# Patient Record
Sex: FEMALE | Race: ASIAN | NOT HISPANIC OR LATINO | Employment: FULL TIME | URBAN - METROPOLITAN AREA
[De-identification: names, ages, dates, MRNs, and addresses within clinical notes are randomized per-mention and may not be internally consistent; named-entity substitution may affect disease eponyms.]

---

## 2017-01-03 ENCOUNTER — COMMUNICATION - HEALTHEAST (OUTPATIENT)
Dept: FAMILY MEDICINE | Facility: CLINIC | Age: 37
End: 2017-01-03

## 2017-01-03 DIAGNOSIS — E03.9 HYPOTHYROID: ICD-10-CM

## 2017-01-26 ENCOUNTER — COMMUNICATION - HEALTHEAST (OUTPATIENT)
Dept: FAMILY MEDICINE | Facility: CLINIC | Age: 37
End: 2017-01-26

## 2017-02-28 ENCOUNTER — COMMUNICATION - HEALTHEAST (OUTPATIENT)
Dept: FAMILY MEDICINE | Facility: CLINIC | Age: 37
End: 2017-02-28

## 2017-03-07 ENCOUNTER — OFFICE VISIT - HEALTHEAST (OUTPATIENT)
Dept: FAMILY MEDICINE | Facility: CLINIC | Age: 37
End: 2017-03-07

## 2017-03-07 DIAGNOSIS — N92.0 MENORRHAGIA: ICD-10-CM

## 2017-03-07 DIAGNOSIS — E03.9 HYPOTHYROIDISM: ICD-10-CM

## 2017-03-07 DIAGNOSIS — D50.0 IRON DEFICIENCY ANEMIA DUE TO CHRONIC BLOOD LOSS: ICD-10-CM

## 2017-03-08 ENCOUNTER — COMMUNICATION - HEALTHEAST (OUTPATIENT)
Dept: FAMILY MEDICINE | Facility: CLINIC | Age: 37
End: 2017-03-08

## 2017-03-09 ENCOUNTER — AMBULATORY - HEALTHEAST (OUTPATIENT)
Dept: LAB | Facility: CLINIC | Age: 37
End: 2017-03-09

## 2017-03-09 DIAGNOSIS — D50.9 IRON DEFICIENCY ANEMIA: ICD-10-CM

## 2017-03-09 DIAGNOSIS — E03.9 HYPOTHYROID: ICD-10-CM

## 2017-03-09 DIAGNOSIS — D50.0 IRON DEFICIENCY ANEMIA DUE TO CHRONIC BLOOD LOSS: ICD-10-CM

## 2017-03-09 DIAGNOSIS — N92.0 MENORRHAGIA: ICD-10-CM

## 2017-03-21 ENCOUNTER — AMBULATORY - HEALTHEAST (OUTPATIENT)
Dept: FAMILY MEDICINE | Facility: CLINIC | Age: 37
End: 2017-03-21

## 2017-03-21 DIAGNOSIS — D64.9 ANEMIA: ICD-10-CM

## 2017-04-05 ENCOUNTER — COMMUNICATION - HEALTHEAST (OUTPATIENT)
Dept: FAMILY MEDICINE | Facility: CLINIC | Age: 37
End: 2017-04-05

## 2017-04-05 ENCOUNTER — COMMUNICATION - HEALTHEAST (OUTPATIENT)
Dept: SCHEDULING | Facility: CLINIC | Age: 37
End: 2017-04-05

## 2017-04-05 ENCOUNTER — OFFICE VISIT - HEALTHEAST (OUTPATIENT)
Dept: FAMILY MEDICINE | Facility: CLINIC | Age: 37
End: 2017-04-05

## 2017-04-05 DIAGNOSIS — Z01.818 PREOP EXAMINATION: ICD-10-CM

## 2017-04-05 DIAGNOSIS — D64.9 ANEMIA: ICD-10-CM

## 2017-04-05 DIAGNOSIS — N92.0 MENORRHAGIA: ICD-10-CM

## 2017-04-05 DIAGNOSIS — E03.9 HYPOTHYROIDISM: ICD-10-CM

## 2017-04-05 ASSESSMENT — MIFFLIN-ST. JEOR: SCORE: 1163.03

## 2017-04-06 ENCOUNTER — AMBULATORY - HEALTHEAST (OUTPATIENT)
Dept: FAMILY MEDICINE | Facility: CLINIC | Age: 37
End: 2017-04-06

## 2017-04-06 DIAGNOSIS — D62 ANEMIA DUE TO ACUTE BLOOD LOSS: ICD-10-CM

## 2017-04-07 ENCOUNTER — INFUSION - HEALTHEAST (OUTPATIENT)
Dept: INFUSION THERAPY | Facility: CLINIC | Age: 37
End: 2017-04-07

## 2017-04-07 ENCOUNTER — COMMUNICATION - HEALTHEAST (OUTPATIENT)
Dept: FAMILY MEDICINE | Facility: CLINIC | Age: 37
End: 2017-04-07

## 2017-04-07 DIAGNOSIS — D50.0 IRON DEFICIENCY ANEMIA DUE TO CHRONIC BLOOD LOSS: ICD-10-CM

## 2017-04-10 ENCOUNTER — INFUSION - HEALTHEAST (OUTPATIENT)
Dept: INFUSION THERAPY | Facility: HOSPITAL | Age: 37
End: 2017-04-10

## 2017-04-10 DIAGNOSIS — D62 ACUTE BLOOD LOSS ANEMIA: ICD-10-CM

## 2017-04-10 ASSESSMENT — MIFFLIN-ST. JEOR: SCORE: 1178.91

## 2017-04-11 ENCOUNTER — INFUSION - HEALTHEAST (OUTPATIENT)
Dept: INFUSION THERAPY | Facility: HOSPITAL | Age: 37
End: 2017-04-11

## 2017-04-11 DIAGNOSIS — D62 ACUTE BLOOD LOSS ANEMIA: ICD-10-CM

## 2017-04-12 ENCOUNTER — RECORDS - HEALTHEAST (OUTPATIENT)
Dept: ADMINISTRATIVE | Facility: OTHER | Age: 37
End: 2017-04-12

## 2017-04-12 ENCOUNTER — COMMUNICATION - HEALTHEAST (OUTPATIENT)
Dept: FAMILY MEDICINE | Facility: CLINIC | Age: 37
End: 2017-04-12

## 2017-04-12 ENCOUNTER — AMBULATORY - HEALTHEAST (OUTPATIENT)
Dept: LAB | Facility: CLINIC | Age: 37
End: 2017-04-12

## 2017-04-12 DIAGNOSIS — D64.9 ANEMIA: ICD-10-CM

## 2017-04-12 ASSESSMENT — MIFFLIN-ST. JEOR: SCORE: 1177.1

## 2017-04-13 ENCOUNTER — COMMUNICATION - HEALTHEAST (OUTPATIENT)
Dept: SCHEDULING | Facility: CLINIC | Age: 37
End: 2017-04-13

## 2017-04-13 ENCOUNTER — ANESTHESIA - HEALTHEAST (OUTPATIENT)
Dept: SURGERY | Facility: HOSPITAL | Age: 37
End: 2017-04-13

## 2017-04-13 ENCOUNTER — SURGERY - HEALTHEAST (OUTPATIENT)
Dept: SURGERY | Facility: HOSPITAL | Age: 37
End: 2017-04-13

## 2017-04-13 ASSESSMENT — MIFFLIN-ST. JEOR: SCORE: 1183.45

## 2017-04-18 ENCOUNTER — OFFICE VISIT - HEALTHEAST (OUTPATIENT)
Dept: FAMILY MEDICINE | Facility: CLINIC | Age: 37
End: 2017-04-18

## 2017-04-18 ENCOUNTER — COMMUNICATION - HEALTHEAST (OUTPATIENT)
Dept: FAMILY MEDICINE | Facility: CLINIC | Age: 37
End: 2017-04-18

## 2017-04-18 DIAGNOSIS — D62 ACUTE BLOOD LOSS ANEMIA: ICD-10-CM

## 2017-04-18 DIAGNOSIS — E03.9 HYPOTHYROID: ICD-10-CM

## 2017-04-18 DIAGNOSIS — D25.9 LEIOMYOMA OF UTERUS: ICD-10-CM

## 2017-04-18 DIAGNOSIS — Z09 HOSPITAL DISCHARGE FOLLOW-UP: ICD-10-CM

## 2017-10-06 ENCOUNTER — OFFICE VISIT - HEALTHEAST (OUTPATIENT)
Dept: FAMILY MEDICINE | Facility: CLINIC | Age: 37
End: 2017-10-06

## 2017-10-06 DIAGNOSIS — B34.9 VIRAL SYNDROME: ICD-10-CM

## 2017-10-06 DIAGNOSIS — E03.9 HYPOTHYROID: ICD-10-CM

## 2017-10-06 DIAGNOSIS — R03.0 ELEVATED BLOOD PRESSURE, SITUATIONAL: ICD-10-CM

## 2017-10-06 DIAGNOSIS — D50.0 IRON DEFICIENCY ANEMIA DUE TO CHRONIC BLOOD LOSS: ICD-10-CM

## 2017-10-06 DIAGNOSIS — D64.9 ANEMIA: ICD-10-CM

## 2017-10-13 ENCOUNTER — AMBULATORY - HEALTHEAST (OUTPATIENT)
Dept: FAMILY MEDICINE | Facility: CLINIC | Age: 37
End: 2017-10-13

## 2018-05-11 ENCOUNTER — OFFICE VISIT - HEALTHEAST (OUTPATIENT)
Dept: FAMILY MEDICINE | Facility: CLINIC | Age: 38
End: 2018-05-11

## 2018-05-11 DIAGNOSIS — I10 HIGH BLOOD PRESSURE: ICD-10-CM

## 2018-05-11 DIAGNOSIS — K04.7 APICAL ABSCESS: ICD-10-CM

## 2018-05-11 DIAGNOSIS — E03.9 HYPOTHYROIDISM, UNSPECIFIED TYPE: ICD-10-CM

## 2018-05-11 ASSESSMENT — MIFFLIN-ST. JEOR: SCORE: 1185.71

## 2018-05-15 ENCOUNTER — COMMUNICATION - HEALTHEAST (OUTPATIENT)
Dept: FAMILY MEDICINE | Facility: CLINIC | Age: 38
End: 2018-05-15

## 2018-05-17 ENCOUNTER — COMMUNICATION - HEALTHEAST (OUTPATIENT)
Dept: FAMILY MEDICINE | Facility: CLINIC | Age: 38
End: 2018-05-17

## 2018-05-17 ENCOUNTER — OFFICE VISIT - HEALTHEAST (OUTPATIENT)
Dept: FAMILY MEDICINE | Facility: CLINIC | Age: 38
End: 2018-05-17

## 2018-05-17 DIAGNOSIS — E03.9 HYPOTHYROIDISM, UNSPECIFIED TYPE: ICD-10-CM

## 2018-05-17 DIAGNOSIS — R42 DIZZINESS AND GIDDINESS: ICD-10-CM

## 2018-05-17 DIAGNOSIS — I10 HYPERTENSION GOAL BP (BLOOD PRESSURE) < 140/90: ICD-10-CM

## 2018-05-17 DIAGNOSIS — D25.9 LEIOMYOMA OF UTERUS: ICD-10-CM

## 2018-05-17 LAB
ANION GAP SERPL CALCULATED.3IONS-SCNC: 10 MMOL/L (ref 5–18)
BUN SERPL-MCNC: 17 MG/DL (ref 8–22)
CALCIUM SERPL-MCNC: 10 MG/DL (ref 8.5–10.5)
CHLORIDE BLD-SCNC: 101 MMOL/L (ref 98–107)
CO2 SERPL-SCNC: 26 MMOL/L (ref 22–31)
CREAT SERPL-MCNC: 1.13 MG/DL (ref 0.6–1.1)
GFR SERPL CREATININE-BSD FRML MDRD: 54 ML/MIN/1.73M2
GLUCOSE BLD-MCNC: 96 MG/DL (ref 70–125)
POTASSIUM BLD-SCNC: 4 MMOL/L (ref 3.5–5)
SODIUM SERPL-SCNC: 137 MMOL/L (ref 136–145)

## 2018-05-29 ENCOUNTER — COMMUNICATION - HEALTHEAST (OUTPATIENT)
Dept: FAMILY MEDICINE | Facility: CLINIC | Age: 38
End: 2018-05-29

## 2018-06-01 ENCOUNTER — COMMUNICATION - HEALTHEAST (OUTPATIENT)
Dept: FAMILY MEDICINE | Facility: CLINIC | Age: 38
End: 2018-06-01

## 2018-06-19 ENCOUNTER — RECORDS - HEALTHEAST (OUTPATIENT)
Dept: ADMINISTRATIVE | Facility: OTHER | Age: 38
End: 2018-06-19

## 2020-12-15 ENCOUNTER — COMMUNICATION - HEALTHEAST (OUTPATIENT)
Dept: SCHEDULING | Facility: CLINIC | Age: 40
End: 2020-12-15

## 2020-12-17 ENCOUNTER — COMMUNICATION - HEALTHEAST (OUTPATIENT)
Dept: SCHEDULING | Facility: CLINIC | Age: 40
End: 2020-12-17

## 2020-12-18 ENCOUNTER — COMMUNICATION - HEALTHEAST (OUTPATIENT)
Dept: FAMILY MEDICINE | Facility: CLINIC | Age: 40
End: 2020-12-18

## 2020-12-31 ENCOUNTER — COMMUNICATION - HEALTHEAST (OUTPATIENT)
Dept: FAMILY MEDICINE | Facility: CLINIC | Age: 40
End: 2020-12-31

## 2021-01-07 ENCOUNTER — OFFICE VISIT - HEALTHEAST (OUTPATIENT)
Dept: FAMILY MEDICINE | Facility: CLINIC | Age: 41
End: 2021-01-07

## 2021-01-07 DIAGNOSIS — E06.3 HASHIMOTO'S THYROIDITIS: ICD-10-CM

## 2021-01-07 DIAGNOSIS — Z09 HOSPITAL DISCHARGE FOLLOW-UP: ICD-10-CM

## 2021-01-07 DIAGNOSIS — E06.3 HYPOTHYROIDISM DUE TO HASHIMOTO'S THYROIDITIS: ICD-10-CM

## 2021-01-07 DIAGNOSIS — I50.21 ACUTE SYSTOLIC CONGESTIVE HEART FAILURE (H): ICD-10-CM

## 2021-01-07 LAB
ANION GAP SERPL CALCULATED.3IONS-SCNC: 12 MMOL/L (ref 5–18)
BUN SERPL-MCNC: 21 MG/DL (ref 8–22)
CALCIUM SERPL-MCNC: 9.8 MG/DL (ref 8.5–10.5)
CHLORIDE BLD-SCNC: 101 MMOL/L (ref 98–107)
CO2 SERPL-SCNC: 27 MMOL/L (ref 22–31)
CREAT SERPL-MCNC: 1.17 MG/DL (ref 0.6–1.1)
GFR SERPL CREATININE-BSD FRML MDRD: 51 ML/MIN/1.73M2
GLUCOSE BLD-MCNC: 85 MG/DL (ref 70–125)
POTASSIUM BLD-SCNC: 4.1 MMOL/L (ref 3.5–5)
SODIUM SERPL-SCNC: 140 MMOL/L (ref 136–145)
TSH SERPL DL<=0.005 MIU/L-ACNC: 0.65 UIU/ML (ref 0.3–5)

## 2021-01-08 LAB — BNP SERPL-MCNC: 30 PG/ML (ref 0–64)

## 2021-02-05 ENCOUNTER — COMMUNICATION - HEALTHEAST (OUTPATIENT)
Dept: CARDIOLOGY | Facility: CLINIC | Age: 41
End: 2021-02-05

## 2021-02-08 ENCOUNTER — OFFICE VISIT - HEALTHEAST (OUTPATIENT)
Dept: CARDIOLOGY | Facility: CLINIC | Age: 41
End: 2021-02-08

## 2021-02-08 DIAGNOSIS — I50.20 HEART FAILURE WITH REDUCED EJECTION FRACTION, NYHA CLASS II (H): ICD-10-CM

## 2021-02-08 LAB
ANION GAP SERPL CALCULATED.3IONS-SCNC: 11 MMOL/L (ref 5–18)
BUN SERPL-MCNC: 20 MG/DL (ref 8–22)
CALCIUM SERPL-MCNC: 9.1 MG/DL (ref 8.5–10.5)
CHLORIDE BLD-SCNC: 102 MMOL/L (ref 98–107)
CO2 SERPL-SCNC: 25 MMOL/L (ref 22–31)
CREAT SERPL-MCNC: 1.06 MG/DL (ref 0.6–1.1)
GFR SERPL CREATININE-BSD FRML MDRD: 58 ML/MIN/1.73M2
GLUCOSE BLD-MCNC: 153 MG/DL (ref 70–125)
POTASSIUM BLD-SCNC: 4 MMOL/L (ref 3.5–5)
SODIUM SERPL-SCNC: 138 MMOL/L (ref 136–145)

## 2021-02-15 ENCOUNTER — COMMUNICATION - HEALTHEAST (OUTPATIENT)
Dept: FAMILY MEDICINE | Facility: CLINIC | Age: 41
End: 2021-02-15

## 2021-02-26 ENCOUNTER — COMMUNICATION - HEALTHEAST (OUTPATIENT)
Dept: CARDIOLOGY | Facility: CLINIC | Age: 41
End: 2021-02-26

## 2021-03-01 ENCOUNTER — OFFICE VISIT - HEALTHEAST (OUTPATIENT)
Dept: CARDIOLOGY | Facility: CLINIC | Age: 41
End: 2021-03-01

## 2021-03-01 DIAGNOSIS — I50.22 CHRONIC SYSTOLIC HEART FAILURE (H): ICD-10-CM

## 2021-03-01 LAB
ANION GAP SERPL CALCULATED.3IONS-SCNC: 10 MMOL/L (ref 5–18)
BUN SERPL-MCNC: 19 MG/DL (ref 8–22)
CALCIUM SERPL-MCNC: 9.8 MG/DL (ref 8.5–10.5)
CHLORIDE BLD-SCNC: 99 MMOL/L (ref 98–107)
CO2 SERPL-SCNC: 26 MMOL/L (ref 22–31)
CREAT SERPL-MCNC: 1.16 MG/DL (ref 0.6–1.1)
GFR SERPL CREATININE-BSD FRML MDRD: 52 ML/MIN/1.73M2
GLUCOSE BLD-MCNC: 363 MG/DL (ref 70–125)
POTASSIUM BLD-SCNC: 4 MMOL/L (ref 3.5–5)
SODIUM SERPL-SCNC: 135 MMOL/L (ref 136–145)

## 2021-03-01 ASSESSMENT — MIFFLIN-ST. JEOR: SCORE: 1208.39

## 2021-03-12 ENCOUNTER — COMMUNICATION - HEALTHEAST (OUTPATIENT)
Dept: CARDIOLOGY | Facility: CLINIC | Age: 41
End: 2021-03-12

## 2021-03-15 ENCOUNTER — OFFICE VISIT - HEALTHEAST (OUTPATIENT)
Dept: CARDIOLOGY | Facility: CLINIC | Age: 41
End: 2021-03-15

## 2021-03-15 DIAGNOSIS — I50.20 HEART FAILURE WITH REDUCED EJECTION FRACTION, NYHA CLASS II (H): ICD-10-CM

## 2021-03-15 DIAGNOSIS — I50.22 CHRONIC SYSTOLIC HEART FAILURE (H): ICD-10-CM

## 2021-03-15 RX ORDER — FUROSEMIDE 20 MG
20 TABLET ORAL DAILY
Qty: 90 TABLET | Refills: 3 | Status: SHIPPED | OUTPATIENT
Start: 2021-03-15 | End: 2022-01-20

## 2021-03-15 RX ORDER — LOSARTAN POTASSIUM 25 MG/1
25 TABLET ORAL DAILY
Qty: 90 TABLET | Refills: 3 | Status: SHIPPED | OUTPATIENT
Start: 2021-03-15 | End: 2021-12-16

## 2021-03-15 ASSESSMENT — MIFFLIN-ST. JEOR: SCORE: 1222

## 2021-03-30 ENCOUNTER — OFFICE VISIT - HEALTHEAST (OUTPATIENT)
Dept: CARDIOLOGY | Facility: CLINIC | Age: 41
End: 2021-03-30

## 2021-03-30 DIAGNOSIS — I50.20 HEART FAILURE WITH REDUCED EJECTION FRACTION, NYHA CLASS II (H): ICD-10-CM

## 2021-03-30 DIAGNOSIS — I50.22 CHRONIC SYSTOLIC HEART FAILURE (H): ICD-10-CM

## 2021-03-30 LAB
ANION GAP SERPL CALCULATED.3IONS-SCNC: 8 MMOL/L (ref 5–18)
BUN SERPL-MCNC: 17 MG/DL (ref 8–22)
CALCIUM SERPL-MCNC: 9.4 MG/DL (ref 8.5–10.5)
CHLORIDE BLD-SCNC: 100 MMOL/L (ref 98–107)
CO2 SERPL-SCNC: 30 MMOL/L (ref 22–31)
CREAT SERPL-MCNC: 1.08 MG/DL (ref 0.6–1.1)
GFR SERPL CREATININE-BSD FRML MDRD: 56 ML/MIN/1.73M2
GLUCOSE BLD-MCNC: 215 MG/DL (ref 70–125)
POTASSIUM BLD-SCNC: 4.2 MMOL/L (ref 3.5–5)
SODIUM SERPL-SCNC: 138 MMOL/L (ref 136–145)

## 2021-03-30 ASSESSMENT — MIFFLIN-ST. JEOR: SCORE: 1217.47

## 2021-04-08 ENCOUNTER — HOSPITAL ENCOUNTER (OUTPATIENT)
Dept: CARDIOLOGY | Facility: HOSPITAL | Age: 41
Discharge: HOME | End: 2021-04-08
Attending: INTERNAL MEDICINE

## 2021-04-08 DIAGNOSIS — I50.20 HEART FAILURE WITH REDUCED EJECTION FRACTION, NYHA CLASS II (H): ICD-10-CM

## 2021-04-08 LAB
AORTIC ROOT: 3.1 CM
AORTIC VALVE MEAN VELOCITY: 72 CM/S
ASCENDING AORTA: 2.9 CM
AV DIMENSIONLESS INDEX VTI: 0.7
AV MEAN GRADIENT: 2 MMHG
AV PEAK GRADIENT: 3.8 MMHG
AV VALVE AREA: 2.1 CM2
AV VELOCITY RATIO: 0.6
BSA FOR ECHO PROCEDURE: 1.64 M2
CV BLOOD PRESSURE: ABNORMAL MMHG
CV ECHO HEIGHT: 58 IN
CV ECHO WEIGHT: 145 LBS
DOP CALC AO PEAK VEL: 97 CM/S
DOP CALC AO VTI: 14.7 CM
DOP CALC LVOT AREA: 2.83 CM2
DOP CALC LVOT DIAMETER: 1.9 CM
DOP CALC LVOT PEAK VEL: 57.9 CM/S
DOP CALC LVOT STROKE VOLUME: 31.2 CM3
DOP CALCLVOT PEAK VEL VTI: 11 CM
EJECTION FRACTION: 45 % (ref 55–75)
FRACTIONAL SHORTENING: 40.4 % (ref 28–44)
INTERVENTRICULAR SEPTUM IN END DIASTOLE: 0.9 CM (ref 0.6–0.9)
IVS/PW RATIO: 1
LA AREA 1: 17.7 CM2
LA AREA 2: 14.5 CM2
LEFT ATRIUM LENGTH: 5.2 CM
LEFT ATRIUM SIZE: 3.7 CM
LEFT ATRIUM VOLUME INDEX: 25.6 ML/M2
LEFT ATRIUM VOLUME: 42 ML
LEFT VENTRICLE CARDIAC INDEX: 1.4 L/MIN/M2
LEFT VENTRICLE CARDIAC OUTPUT: 2.3 L/MIN
LEFT VENTRICLE DIASTOLIC VOLUME INDEX: 67.1 CM3/M2 (ref 29–61)
LEFT VENTRICLE DIASTOLIC VOLUME: 110 CM3 (ref 46–106)
LEFT VENTRICLE HEART RATE: 73 BPM
LEFT VENTRICLE MASS INDEX: 87 G/M2
LEFT VENTRICLE SYSTOLIC VOLUME INDEX: 37.2 CM3/M2 (ref 8–24)
LEFT VENTRICLE SYSTOLIC VOLUME: 61 CM3 (ref 14–42)
LEFT VENTRICULAR INTERNAL DIMENSION IN DIASTOLE: 4.7 CM (ref 3.8–5.2)
LEFT VENTRICULAR INTERNAL DIMENSION IN SYSTOLE: 2.8 CM (ref 2.2–3.5)
LEFT VENTRICULAR MASS: 142.7 G
LEFT VENTRICULAR OUTFLOW TRACT MEAN GRADIENT: 1 MMHG
LEFT VENTRICULAR OUTFLOW TRACT MEAN VELOCITY: 45.8 CM/S
LEFT VENTRICULAR OUTFLOW TRACT PEAK GRADIENT: 1 MMHG
LEFT VENTRICULAR POSTERIOR WALL IN END DIASTOLE: 0.9 CM (ref 0.6–0.9)
LV STROKE VOLUME INDEX: 19 ML/M2
MITRAL VALVE E/A RATIO: 0.8
MV AVERAGE E/E' RATIO: 12.9 CM/S
MV DECELERATION TIME: 243 MS
MV E'TISSUE VEL-LAT: 3.9 CM/S
MV E'TISSUE VEL-MED: 3.51 CM/S
MV LATERAL E/E' RATIO: 12.3
MV MEDIAL E/E' RATIO: 13.6
MV PEAK A VELOCITY: 57.3 CM/S
MV PEAK E VELOCITY: 47.9 CM/S
NUC REST DIASTOLIC VOLUME INDEX: 2320 LBS
NUC REST SYSTOLIC VOLUME INDEX: 58 IN
PR MAX PG: 3 MMHG
PR PEAK VELOCITY: 90.9 CM/S
TRICUSPID VALVE ANULAR PLANE SYSTOLIC EXCURSION: 1.3 CM

## 2021-04-08 ASSESSMENT — MIFFLIN-ST. JEOR: SCORE: 1217.47

## 2021-04-14 ENCOUNTER — OFFICE VISIT - HEALTHEAST (OUTPATIENT)
Dept: CARDIOLOGY | Facility: CLINIC | Age: 41
End: 2021-04-14

## 2021-04-14 DIAGNOSIS — I50.22 CHRONIC SYSTOLIC HEART FAILURE (H): ICD-10-CM

## 2021-04-14 ASSESSMENT — MIFFLIN-ST. JEOR: SCORE: 1217.47

## 2021-05-04 ENCOUNTER — COMMUNICATION - HEALTHEAST (OUTPATIENT)
Dept: CARDIOLOGY | Facility: CLINIC | Age: 41
End: 2021-05-04

## 2021-05-04 DIAGNOSIS — I50.22 CHRONIC SYSTOLIC HEART FAILURE (H): ICD-10-CM

## 2021-05-04 RX ORDER — CARVEDILOL 25 MG/1
25 TABLET ORAL 2 TIMES DAILY
Qty: 180 TABLET | Refills: 1 | Status: SHIPPED | OUTPATIENT
Start: 2021-05-04 | End: 2021-09-30

## 2021-05-10 ENCOUNTER — RECORDS - HEALTHEAST (OUTPATIENT)
Dept: FAMILY MEDICINE | Facility: CLINIC | Age: 41
End: 2021-05-10

## 2021-05-30 VITALS — BODY MASS INDEX: 26.81 KG/M2 | WEIGHT: 133 LBS | HEIGHT: 59 IN

## 2021-05-30 VITALS — BODY MASS INDEX: 27.92 KG/M2 | HEIGHT: 58 IN | WEIGHT: 133 LBS

## 2021-05-30 VITALS — BODY MASS INDEX: 28.22 KG/M2 | WEIGHT: 135 LBS

## 2021-05-30 VITALS — BODY MASS INDEX: 26.46 KG/M2 | WEIGHT: 131 LBS

## 2021-05-30 VITALS — WEIGHT: 134 LBS | HEIGHT: 59 IN | BODY MASS INDEX: 27.01 KG/M2

## 2021-05-31 VITALS — BODY MASS INDEX: 28.28 KG/M2 | WEIGHT: 140 LBS

## 2021-06-01 VITALS — BODY MASS INDEX: 28.97 KG/M2 | HEIGHT: 58 IN | WEIGHT: 138 LBS

## 2021-06-01 VITALS — WEIGHT: 136 LBS | BODY MASS INDEX: 28.42 KG/M2

## 2021-06-05 VITALS
OXYGEN SATURATION: 98 % | WEIGHT: 144 LBS | HEART RATE: 98 BPM | BODY MASS INDEX: 30.1 KG/M2 | SYSTOLIC BLOOD PRESSURE: 118 MMHG | DIASTOLIC BLOOD PRESSURE: 86 MMHG | RESPIRATION RATE: 14 BRPM

## 2021-06-05 VITALS
HEIGHT: 58 IN | HEART RATE: 80 BPM | WEIGHT: 145 LBS | BODY MASS INDEX: 30.44 KG/M2 | DIASTOLIC BLOOD PRESSURE: 70 MMHG | RESPIRATION RATE: 16 BRPM | SYSTOLIC BLOOD PRESSURE: 110 MMHG

## 2021-06-05 VITALS
HEIGHT: 58 IN | WEIGHT: 146 LBS | DIASTOLIC BLOOD PRESSURE: 74 MMHG | BODY MASS INDEX: 30.64 KG/M2 | SYSTOLIC BLOOD PRESSURE: 104 MMHG | OXYGEN SATURATION: 98 % | RESPIRATION RATE: 16 BRPM | HEART RATE: 79 BPM

## 2021-06-05 VITALS — WEIGHT: 145 LBS | HEIGHT: 58 IN | BODY MASS INDEX: 30.44 KG/M2

## 2021-06-05 VITALS
WEIGHT: 145 LBS | HEIGHT: 58 IN | RESPIRATION RATE: 16 BRPM | OXYGEN SATURATION: 98 % | HEART RATE: 80 BPM | SYSTOLIC BLOOD PRESSURE: 102 MMHG | DIASTOLIC BLOOD PRESSURE: 78 MMHG | BODY MASS INDEX: 30.44 KG/M2

## 2021-06-05 VITALS
SYSTOLIC BLOOD PRESSURE: 110 MMHG | HEART RATE: 96 BPM | BODY MASS INDEX: 30.02 KG/M2 | DIASTOLIC BLOOD PRESSURE: 78 MMHG | WEIGHT: 143 LBS | HEIGHT: 58 IN | RESPIRATION RATE: 16 BRPM

## 2021-06-05 VITALS
WEIGHT: 140.75 LBS | SYSTOLIC BLOOD PRESSURE: 130 MMHG | TEMPERATURE: 97.8 F | DIASTOLIC BLOOD PRESSURE: 82 MMHG | BODY MASS INDEX: 29.42 KG/M2 | HEART RATE: 101 BPM

## 2021-06-08 ENCOUNTER — COMMUNICATION - HEALTHEAST (OUTPATIENT)
Dept: FAMILY MEDICINE | Facility: CLINIC | Age: 41
End: 2021-06-08

## 2021-06-08 DIAGNOSIS — E06.3 HYPOTHYROIDISM DUE TO HASHIMOTO'S THYROIDITIS: ICD-10-CM

## 2021-06-09 RX ORDER — LEVOTHYROXINE SODIUM 50 UG/1
TABLET ORAL
Qty: 30 TABLET | Refills: 0 | Status: SHIPPED | OUTPATIENT
Start: 2021-06-09 | End: 2022-01-12

## 2021-06-09 NOTE — PROGRESS NOTES
"Pt was seen by GP and OB/GYN secondary to anemia from vaginal bleed.  Pt was to have a D & C but procedure was cancelled due to hemoglobin of 5.5.  Orders  to give venofer x3 and recheck next week. Goal is a hemoglobin of 7 before procedure can be done. Pt states she doesn't feel much different but after extensive discussion with her she did state she has \"some\" periods of lightheadedness if she walks fast. She has been going to work. Pt is drinking a lot of fluids and states she does not get lightheaded when standing. Pt understands hemoglobin relationship with oxygenation and her tachycardia when explained to her.  Resting .  Not exhibiting shortness of breath.  Pt states she will go to the ED if she feels it's unsafe with activity, but for now wants to just follow the orders of venofer and recheck. Pt also states that her hemoglobin fluctuates with amount of bleeding, was at a 6 recently.  Venofer given without difficulty. vss   "

## 2021-06-09 NOTE — PROGRESS NOTES
Assessment/plan  1. Hypothyroidism  2. White coat hypertension  3. Iron deficiency anemia due to chronic blood loss  - HM2(CBC w/o Differential)  - HM2(CBC w/o Differential); Future  4. Menorrhagia  Vital signs stable.   Discussed options of treatment regarding severe anemia, appears to be due to menorrhagia.   Patient notes she was offered iron infusion in the past, but declines again citing the irregular vaginal bleeding is not improving at this point anyway and she would rather take oral iron.   This was prescribed. Strongly urged to be compliant this time. New formulation sent. If insurance does not cover she has agreed to purchase over the counter iron supplementation.   Long discussion on iron rich foods.   Regarding irregular bleeding, she has declined birth control in the past. Has been seen by Ob/gyn in the past where work up was unremarkable. She now agrees to consider birth control. We discussed options at length. She agrees to start OCP. Discussed importance of compliance. Discussed possible adverse affects. She also will keep her appointment with Ob/gyn in two weeks to ensure there is no further work up that is needed. No imaging ordered today.   TSH and thyroid hormones not drawn. Is still taking synthroid. Will return for collection of this, medications to be adjusted as appropriate.       Total time >25 minutes, more than half spent counseling, coordinating care of above.     Subjective  [age] year old female here for follow up.   Has history of hypothyroidism, irregular menses since 2016, anemia. Has seen hem onc in past. Has seen ob gyn in the past regarding ongoing menorrhagia, anemia, all thought to be correlated to abnormal thyroid. She has made a follow up appointment with OB gyn in 2 weeks.   She used to skip several months in her menstrual cycle. Since 2016, she has more and more intermenstrual bleeding, to the point now where the bleeding stops for about one week only. Every now and  again she has a large gush of blood. This last occurred today.   She does not have much abdominal pain or cramping. Denies chest pain, dizziness, shortness of breath, changes in her skin colour. She thinks she might tire out easily more lately. We discussed her hemogram which panic result returned quickly.   She has been offered IV iron. She does not want to take that. She has not been taking iron because her last refill was not covered by insurance for some reason. She is taking her synthroid as directed.   Is trying to eat iron rich food.   Has been on birth control for a short time in the past, she thinks to control her cycle, but nothing lately.     Past Medical History:   Diagnosis Date     Anemia     severe, Hbg 7.2 in 1/2016     Endometrial disorder 2016    pelvic u/s showed heterogenous stripe; getting it biopsied in 2/2016     Hypothyroid     as of 1/2016 bumped up to 50mcg per day     Overweight      Psoriasis      Patient Active Problem List   Diagnosis     Vaginal Itching     Vaginal Discharge     Seborrheic Dermatitis Of The Scalp     Hypothyroidism     Psoriasis     Lightheadedness     Menorrhagia     Iron deficiency anemia due to chronic blood loss     No past surgical history on file.  No family history on file.  Social History     Social History     Marital status: Single     Spouse name: N/A     Number of children: N/A     Years of education: N/A     Occupational History     Not on file.     Social History Main Topics     Smoking status: Never Smoker     Smokeless tobacco: Never Used     Alcohol use No     Drug use: No     Sexual activity: No     Other Topics Concern     Not on file     Social History Narrative     Current Outpatient Prescriptions on File Prior to Visit   Medication Sig Dispense Refill     clobetasol (TEMOVATE) 0.05 % ointment Apply topically 2 (two) times a day. 60 g 3     levothyroxine (SYNTHROID) 50 MCG tablet Take 1 tablet (50 mcg total) by mouth daily. 90 tablet 4      triamcinolone (KENALOG) 0.5 % ointment Apply topically 3 (three) times a day. 30 g 0     No current facility-administered medications on file prior to visit.      Objective  Vitals:    03/07/17 1438   BP: 142/86   Pulse:    Resp:        General Appearance:  Alert, cooperative, no distress, appears stated age   Head:  Normocephalic, without obvious abnormality, atraumatic   Eyes:  PERRL, conjunctiva/corneas clear, EOM's intact   Throat: Lips, mucosa, and tongue normal   Neck: Supple, symmetrical, trachea midline, no adenopathy;  thyroid: not enlarged, symmetric, no tenderness/mass/nodules; no carotid bruit or JVD   Lungs:   Clear to auscultation bilaterally, respirations unlabored   Heart:  Regular rate and rhythm, S1 and S2 normal, no murmur, rub, or gallop   Skin: Skin color, texture, turgor normal, no rashes or lesions   Lymph nodes: Cervical, supraclavicular nodes normal   Neurologic: Normal, CN 2-12 intact

## 2021-06-09 NOTE — PROGRESS NOTES
Assessment/Plan:      Visit for Preoperative Exam.   1. Preop examination  2. Menorrhagia  3. Hypothyroidism  - HM2(CBC w/o Differential)  - Basic Metabolic Panel  - Thyroid Stimulating Hormone (TSH)  - T3, Total  - T4, Free  - Pregnancy (Beta-hCG, Qual), Urine  Appears well. Vital signs stable.   Reviewed history, physical, minimal change in hemoglobin.   Cleared for surgery though location may be changed according to hemoglobin results.  Patient approved for surgery with general or local anesthesia. Postoperative Care will be managed by Hospital Service. Labs will be done as indicated. Above recommendations were reviewed with the patient. Follow up as needed.     Subjective:     Scheduled Procedure: d and c, hysteroscopy  Surgery Date:  4/6/17  Surgery Location:  [facility]  Surgeon:  Dr. Anibal VIRK  [age] year old female here for pre op evaluation.   Has long history of worsening anemia due to menorrhagia, unclear etiology.   She has history of abnormal thyroid, well controlled now.   Her anemia is worsening.   Is taking iron supplementation.   Has been working with OB Gyn.   She feels well today. Her bleeding is actually a little improved though still present.   She needs repeat hemoglobin.   No prior surgery.   Her sister had a D an C with no complications.   No other significant family history.  Quit smoking and drinking alcohol one year ago.          Current Outpatient Prescriptions   Medication Sig Dispense Refill     clobetasol (TEMOVATE) 0.05 % ointment Apply topically 2 (two) times a day. 60 g 3     ferrous gluconate (FERGON) 324 MG tablet Take 1 tablet (324 mg total) by mouth 2 (two) times a day. 60 tablet 11     levothyroxine (SYNTHROID) 50 MCG tablet Take 1 tablet (50 mcg total) by mouth daily. 90 tablet 4     triamcinolone (KENALOG) 0.5 % ointment Apply topically 3 (three) times a day. 30 g 0     No current facility-administered medications for this visit.        No Known Allergies    Immunization  History   Administered Date(s) Administered     Influenza, inj, historic 11/01/2009, 12/11/2015     Tdap 03/28/2013       Patient Active Problem List   Diagnosis     Seborrheic Dermatitis Of The Scalp     Hypothyroidism     Psoriasis     Menorrhagia     Iron deficiency anemia due to chronic blood loss       Past Medical History:   Diagnosis Date     Anemia     severe, Hbg 7.2 in 1/2016     Endometrial disorder 2016    pelvic u/s showed heterogenous stripe; getting it biopsied in 2/2016     Hypothyroid     as of 1/2016 bumped up to 50mcg per day     Overweight      Psoriasis        Social History     Social History     Marital status: Single     Spouse name: N/A     Number of children: N/A     Years of education: N/A     Occupational History     Not on file.     Social History Main Topics     Smoking status: Never Smoker     Smokeless tobacco: Never Used     Alcohol use No     Drug use: No     Sexual activity: No     Other Topics Concern     Not on file     Social History Narrative       History reviewed. No pertinent surgical history.  Recent Health  Fever: no  Chills: no  Fatigue: no  Chest Pain: no  Cough: no  Dyspnea: no  Urinary Frequency: no  Nausea: no  Vomiting: no  Diarrhea: no  Abdominal Pain: no  Easy Bruising: no  Lower Extremity Swelling: no  Poor Exercise Tolerance: yes    Most recent Health Maintenance Visit:  1 year(s) ago    Pertinent History  Prior Anesthesia: no  Previous Anesthesia Reaction:  no  Diabetes: no  Cardiovascular Disease: no  Pulmonary Disease: no  Renal Disease: no  GI Disease: no  Sleep Apnea: no  Thromboembolic Problems: no  Clotting Disorder: no  Bleeding Disorder: no  Transfusion Reaction: no  Impaired Immunity: no  Steroid use in the last 6 months: no  Frequent Aspirin use: no    Family history of none    Social history of patient does not wear denture or partial plates, there is no transfusion refusal and there are no concerns regarding care after surgery    After surgery, the  "patient plans to recover at home with family.    Review of Systems  A 12 point comprehensive review of systems was negative except as noted.          Objective:         Vitals:    04/05/17 1518   BP: 130/78   Pulse: (!) 114   Resp: 20   Temp: 97.9  F (36.6  C)   TempSrc: Oral   SpO2: 99%   Weight: 133 lb (60.3 kg)   Height: 4' 10\" (1.473 m)     Physical Exam:  General Appearance: Alert, cooperative, no distress, appears stated age  Head: Normocephalic, without obvious abnormality, atraumatic  Eyes: PERRL, conjunctiva/corneas clear, EOM's intact  Nose: Nares normal, septum midline,mucosa normal, no drainage  Throat: Lips, mucosa, and tongue normal; teeth and gums normal  Neck: Supple, symmetrical, trachea midline, no adenopathy;  thyroid: not enlarged, symmetric, no tenderness/mass/nodules; no carotid bruit or JVD  Lungs: Clear to auscultation bilaterally, respirations unlabored  Heart: Regular rate and rhythm, S1 and S2 normal, no murmur, rub, or gallop  Abdomen: Soft, non-tender, bowel sounds active all four quadrants,  no masses, no organomegaly  Pelvic:deferred  Extremities: Extremities normal, atraumatic, no cyanosis or edema  Skin: Skin color, texture, turgor normal, no rashes or lesions  Lymph nodes: Cervical, supraclavicular, and axillary nodes normal  Neurologic: Normal           Recent Results (from the past 240 hour(s))   Pregnancy (Beta-hCG, Qual), Urine   Result Value Ref Range    Pregnancy Test, Urine Negative Negative    Specific Gravity, UA 1.010 1.001 - 1.030     Others pending, hemoglobin preliminary 5.8 g/dl, sent out.     "

## 2021-06-10 NOTE — PROGRESS NOTES
Assessment/plan  1. Hospital discharge follow-up  2. Acute blood loss anemia  - HM2(CBC w/o Differential)  3. Hypothyroid  4. Leiomyoma of uterus  Hospital records reviewed.   Problem list, past medical, surgical, medications reconciled.   Appears well.   Pathology report of leiomyoma discussed with patient.   We will recheck hemogram today. Continue iron supplementation until normal.   Advised to keep follow up with ob gyn.   Will continue oral contraceptive medication until desires pregnancy as directed by ob gyn.   Follow up on hemogram and thyroid studies in three months. Recent T4 and T3 is normal though TSH elevated.         Subjective  [age] year old female here for follow up.   Recently admitted and discharged from hospital, s/p hysteroscopy, d and c, excision of leiomyoma due to excessive bleeding, anemia.   Has been working with ob gyn, her last hemogram was not corrected with IV infusion of iron, was transfused two units of PRBC prior to surgery. Large fibroid and open cervix noted. EHR reviewed and hospital records reviewed.   She feels better today. She notices her heart is not racing as much. Her bleeding is little. Denies pelvic pain, abnormal discharge, dysuria, fever.   Is planning to return to work. Is taking sprintec daily as directed. Is taking oral iron also and wondering if she needs to continue that.   Is tolerating diet. A little constipated some times.   No plans to conceive at his time but some time in the future. Is taking her thyroid medication.       Past Medical History:   Diagnosis Date     Anemia     severe, Hbg 7.2 in 1/2016     Endometrial disorder 2016    pelvic u/s showed heterogenous stripe; getting it biopsied in 2/2016     Hypothyroid     as of 1/2016 bumped up to 50mcg per day     Iron deficiency anemia 4/6/2017     Leiomyoma of uterus 04/13/2017     Overweight      Psoriasis      Patient Active Problem List   Diagnosis     Vaginal Itching     Vaginal Discharge      Seborrheic Dermatitis Of The Scalp     Hypothyroidism, unspecified type     Psoriasis     Lightheadedness     Menorrhagia with regular cycle     Iron deficiency anemia due to chronic blood loss     Iron deficiency anemia     Acute blood loss anemia     Anemia     Leiomyoma of uterus     Past Surgical History:   Procedure Laterality Date     WA HYSTEROSCOPY,W/ENDOMETRIAL ABLATION N/A 4/13/2017    Procedure: HYSTEROSCOPY DILATION AND CURETTAGE, MYOMECTOMY;  Surgeon: Alyse Barnett MD;  Location: [facility];  Service: Gynecology     Family History   Problem Relation Age of Onset     No Medical Problems Mother      No Medical Problems Father      Social History     Social History     Marital status: Single     Spouse name: N/A     Number of children: N/A     Years of education: N/A     Occupational History     Not on file.     Social History Main Topics     Smoking status: Never Smoker     Smokeless tobacco: Never Used     Alcohol use No     Drug use: No     Sexual activity: Yes     Partners: Male     Birth control/ protection: OCP     Other Topics Concern     Not on file     Social History Narrative     Current Outpatient Prescriptions on File Prior to Visit   Medication Sig Dispense Refill     clobetasol (TEMOVATE) 0.05 % ointment Apply 1 application topically as needed.       ferrous gluconate (FERGON) 324 MG tablet Take 1 tablet (324 mg total) by mouth 2 (two) times a day. 60 tablet 11     ibuprofen (ADVIL,MOTRIN) 800 MG tablet Take 1 tablet (800 mg total) by mouth every 8 (eight) hours as needed. 30 tablet 0     levothyroxine (SYNTHROID) 50 MCG tablet Take 1 tablet (50 mcg total) by mouth daily. 90 tablet 4     norgestimate-ethinyl estradiol (SPRINTEC, 28,) 0.25-35 mg-mcg per tablet Take 1 tablet by mouth daily.       triamcinolone (KENALOG) 0.5 % ointment Apply 1 application topically as needed.       No current facility-administered medications on file prior to visit.      Objective  Vitals:     04/18/17 0903   BP: 122/78   Pulse: 72   Resp: 20       General Appearance:  Alert, cooperative, no distress, appears stated age   Head:  Normocephalic, without obvious abnormality, atraumatic   Eyes:  PERRL, conjunctiva/corneas clear, EOM's intact   Ears:  Normal TM's and external ear canals, both ears   Throat: Lips, mucosa, and tongue normal; teeth and gums normal   Neck: Supple, symmetrical, trachea midline, no adenopathy;  thyroid: not enlarged, symmetric, no tenderness/mass/nodules; no carotid bruit or JVD   Lungs:   Clear to auscultation bilaterally, respirations unlabored   Heart:  Regular rate and rhythm, S1 and S2 normal, no murmur, rub, or gallop   Abdomen:   Soft, non-tender, bowel sounds active all four quadrants,  no masses, no organomegaly   Genitalia: defer   Extremities: Extremities normal, atraumatic, no cyanosis or edema   Skin: Skin color, texture, turgor normal, no rashes or lesions   Neurologic: No focal deficits     Recent Results (from the past 240 hour(s))   Thyroid Stimulating Hormone (TSH)   Result Value Ref Range    TSH 17.98 (H) 0.30 - 5.00 uIU/mL   T4, Free   Result Value Ref Range    Free T4 0.7 0.7 - 1.8 ng/dL   T3, Total   Result Value Ref Range    T3, Total 87 45 - 175 ng/dL   HM2(CBC w/o Differential)   Result Value Ref Range    WBC 9.5 4.0 - 11.0 thou/uL    RBC 2.54 (L) 3.80 - 5.40 mill/uL    Hemoglobin 6.3 (LL) 12.0 - 16.0 g/dL    Hematocrit 23.9 (L) 35.0 - 47.0 %    MCV 94 80 - 100 fL    MCH 24.8 (L) 27.0 - 34.0 pg    MCHC 26.4 (L) 32.0 - 36.0 g/dL    RDW 27.9 (H) 11.0 - 14.5 %    Platelets 415 140 - 440 thou/uL    MPV 10.2 8.5 - 12.5 fL   Type and screen   Result Value Ref Range    ABORh A POS     Antibody Screen Negative Negative   Magnesium   Result Value Ref Range    Magnesium 1.9 1.8 - 2.6 mg/dL   Renal Function Profile   Result Value Ref Range    Albumin 3.0 (L) 3.5 - 5.0 g/dL    Calcium 8.4 (L) 8.5 - 10.5 mg/dL    Phosphorus 4.1 2.5 - 4.5 mg/dL    Glucose 96 70 - 125  mg/dL    BUN 13 8 - 22 mg/dL    Creatinine 0.90 0.60 - 1.10 mg/dL    Sodium 138 136 - 145 mmol/L    Potassium 3.8 3.5 - 5.0 mmol/L    Chloride 110 (H) 98 - 107 mmol/L    CO2 22 22 - 31 mmol/L    Anion Gap, Calculation 6 5 - 18 mmol/L    GFR MDRD Af Amer >60 >60 mL/min/1.73m2    GFR MDRD Non Af Amer >60 >60 mL/min/1.73m2   HM1 (CBC with Diff)   Result Value Ref Range    WBC 8.4 4.0 - 11.0 thou/uL    RBC 3.39 (L) 3.80 - 5.40 mill/uL    Hemoglobin 9.0 (L) 12.0 - 16.0 g/dL    Hematocrit 30.8 (L) 35.0 - 47.0 %    MCV 91 80 - 100 fL    MCH 26.5 (L) 27.0 - 34.0 pg    MCHC 29.2 (L) 32.0 - 36.0 g/dL    RDW 22.1 (H) 11.0 - 14.5 %    Platelets 300 140 - 440 thou/uL    MPV 9.4 8.5 - 12.5 fL    Neutrophils % 64 50 - 70 %    Lymphocytes % 24 20 - 40 %    Monocytes % 9 2 - 10 %    Eosinophils % 2 0 - 6 %    Basophils % 1 0 - 2 %    Neutrophils Absolute 5.3 2.0 - 7.7 thou/uL    Lymphocytes Absolute 2.0 0.8 - 4.4 thou/uL    Monocytes Absolute 0.8 0.0 - 0.9 thou/uL    Eosinophils Absolute 0.2 0.0 - 0.4 thou/uL    Basophils Absolute 0.1 0.0 - 0.2 thou/uL   Manual Differential   Result Value Ref Range    Platelet Estimate Normal Normal    Ovalocytes 1+ (!) Negative    Polychromasia 1+ (!) Negative   Pregnancy, Urine   Result Value Ref Range    Pregnancy Test, Urine Negative Negative    Specific Gravity, UA 1.008 1.001 - 1.030   Surgical Pathology Exam   Result Value Ref Range    Case Report       Surgical Pathology                                Case: [id number]                                    Authorizing Provider:  Alyse Barnett MD     Collected:           04/13/2017 1315              Ordering Location:     [facility] OR     Received:            04/13/2017 1343              Pathologist:           Gabe Gonzales MD                                                         Specimen:    Endometrial Curettings, Endometrial Curettings and Fibroid                                 Final Diagnosis       ENDOMETRIAL TISSUE,  CURETTAGE AND BIOPSY:      1) POLYPOID FRAGMENTS OF DISORDERED PROLIFERATIVE ENDOMETRIUM; NO EVIDENCE OF           ATYPICAL ENDOMETRIAL HYPERPLASIA OR CARCINOMA       2) FRAGMENTS OF HYPERPLASTIC SMOOTH MUSCLE COMPATIBLE WITH LEIOMYOMA       3) SCANTY FRAGMENTS OF BENIGN CHRONICALLY INFLAMED ENDOCERVICAL MUCOSA       4) SCANTY FRAGMENTS OF BENIGN ECTOCERVICAL EPITHELIUM       5) BLOOD, MUCUS, AND EMBEDDED LEUKOCYTES       6) NEGATIVE FOR MALIGNANCY    Microscopic Description       Microscopic examination performed, substantiating the above diagnosis.    Clinical Information       Pre-op Diagnosis:  Excessive or frequent menstruation [N92.0] Anemia [D64.9]   Time in Formalin:  1:22 pm    Gross Description       Received in formalin labeled with the patient's name and endometrial curettings and fibroid is a filter bag with a 1.6 x 1.2 x 0.3 cm aggregate of white-pink to red, irregular, dense and faintly whorled tissue fragments. There are additional Telfa pads with loosely adherent tan-pink to red, irregular soft tissue fragments, mucus and blood clot. These tissue fragments aggregate 2.6 x 1.4 x 0.5 cm.     Also present within the same specimen container is a disrupted, oblong, 4.9 x 4.3 x 3.7 cm mass. This white-pink to red, congested, rubbery and whorled mass is partially surfaced by a pink-red, glistening tissue. No excrescences, indurated lesions or suspicious areas of hemorrhagic necrosis are identified.   RS-5C    SUMMARY OF CASSETTES: 1) Tissue from filter bag; 2-3) Tissue from Telfa pad; 4-5) Representative mass  RJR:sg    Charges CPT: 27012  ICD-10: D25.1     Result Flag  Normal   Crossmatch   Result Value Ref Range    Crossmatch Compatible     Blood Expiration Date [id number]     Unit Type A Pos     Unit Number [id number]     Status Transfused     Component Red Blood Cells     PRODUCT CODE [id number]     Issue Date and Time [id number]     Blood Type 6200     CODING SYSTEM [id number]    Crossmatch    Result Value Ref Range    Crossmatch Compatible     Blood Expiration Date 21993148093935     Unit Type A Pos     Unit Number [id number]     Status Transfused     Component Red Blood Cells     PRODUCT CODE [id number]     Issue Date and Time [id number]     Blood Type 6200     CODING SYSTEM [id number]    HM2(CBC w/o Differential)   Result Value Ref Range    WBC 5.5 4.0 - 11.0 thou/uL    RBC 4.33 3.80 - 5.40 mill/uL    Hemoglobin 11.4 (L) 12.0 - 16.0 g/dL    Hematocrit 37.0 35.0 - 47.0 %    MCV 85 80 - 100 fL    MCH 26.4 (L) 27.0 - 34.0 pg    MCHC 30.9 (L) 32.0 - 36.0 g/dL    RDW 16.7 (H) 11.0 - 14.5 %    Platelets 318 140 - 440 thou/uL    MPV 7.6 7.0 - 10.0 fL

## 2021-06-10 NOTE — ANESTHESIA POSTPROCEDURE EVALUATION
Patient: Be Alexis  HYSTEROSCOPY DILATION AND CURETTAGE, MYOMECTOMY  Anesthesia type: MAC    Patient location: [facility] Surgical Floor  Last vitals:   Vitals:    04/13/17 1351   BP: 137/83   Pulse: 86   Resp: 16   Temp:    SpO2: 99%     Post vital signs: stable  Level of consciousness: awake and responds to simple questions  Post-anesthesia pain: pain controlled  Post-anesthesia nausea and vomiting: no  Pulmonary: unassisted, return to baseline  Cardiovascular: stable and blood pressure at baseline  Hydration: adequate  Anesthetic events: no    QCDR Measures:  ASA# 11 - Kelley-op Cardiac Arrest: ASA11B - Patient did NOT experience unanticipated cardiac arrest  ASA# 12 - Kelley-op Mortality Rate: ASA12B - Patient did NOT die  ASA# 13 - PACU Re-Intubation Rate: ASA13B - Patient did NOT require a new airway mgmt  ASA# 10 - Composite Anes Safety: ASA10A - No serious adverse event  ASA# 38 - New Corneal Injury: ASA38A - No new exposure keratitis or corneal abrasion in PACU    Additional Notes:

## 2021-06-10 NOTE — ANESTHESIA PREPROCEDURE EVALUATION
Anesthesia Evaluation      Patient summary reviewed   No history of anesthetic complications     Airway   Mallampati: III  Neck ROM: full   Pulmonary - negative ROS and normal exam   (-) not a smoker                         Cardiovascular   (-) murmur  Rhythm: regular  Rate: normal,    no murmur   ROS comment: Anemia of 6.3 yesterday due to chronic ongoing uterine bleeding.  Transfused and her Hg is 9.0 today.     Neuro/Psych - negative ROS     Endo/Other    (+) hypothyroidism,      Comments: overweight    GI/Hepatic/Renal    (-) GERD          Dental - normal exam                        Anesthesia Plan  Planned anesthetic: MAC    ASA 2   Induction: intravenous   Anesthetic plan and risks discussed with: patient and spouse    Post-op plan: routine recovery

## 2021-06-10 NOTE — ANESTHESIA CARE TRANSFER NOTE
Last vitals:   Vitals:    04/13/17 1351   BP: 137/83   Pulse: 86   Resp: 16   Temp:    SpO2: 99%     Patient's level of consciousness is awake  Spontaneous respirations: yes  Maintains airway independently: yes  Dentition unchanged: yes  Oropharynx: oropharynx clear of all foreign objects    QCDR Measures:  ASA# 20 - Surgical Safety Checklist: ASA20A - Safety Checks Done  PQRS# 430 - Adult PONV Prevention: 4558F - Pt received => 2 anti-emetic agents (different classes) preop & intraop  ASA# 8 - Peds PONV Prevention: NA - Not pediatric patient, not GA or 2 or more risk factors NOT present  PQRS# 424 - Kelley-op Temp Management: 4559F - At least one body temp DOCUMENTED => 35.5C or 95.9F within required timeframe  PQRS# 426 - PACU Transfer Protocol: - Transfer of care checklist used  ASA# 14 - Acute Post-op Pain: ASA14B - Patient did NOT experience pain >= 7 out of 10    I completed my SBAR handoff to the receiving nurse per policy and procedure.

## 2021-06-10 NOTE — PROGRESS NOTES
Be Alexis arrived for dose 2 Venofer infusion. Lab results were  reviewed as required. She reports she had no problems after her first dose on 04/07/17. Be Alexis was educated on her plan of care. Each medication given today was reviewed prior to administration. Treatment was completed as ordered with no signs of reaction. IV site:peripheral IV patent throughout treatment with positive blood return obtained before and at completion. IV site with no pain, redness, swelling and drainage. IV site flushed with saline and discontinued. Be Alexis has follow-up appointment scheduled tomorrow. Be Alexis was discharged to home. She discharged ambulatory per self at 1608.      Silvia Vallecillo

## 2021-06-10 NOTE — PROGRESS NOTES
1335 Be arrived A&Ox4 ambulatory and stable, confirms she is here for 3rd and final ordered dose of iron sucrose. Pt states she has tolerated infusions w/o adverse Rxn and that she has no questions/concerns today.  PIV L AC, at pt request, w ease, excellent blood return and line easily flushed NS  Iron sucrose infused as ordered; pt tolerated w/o sxs adverse Rxn. Line flushed Ns until clear. VSS. PIV dc'd and site covered w clean dressing.  Dc education reviewed, pt stated good understanding and that her needs were met today. Pt has appt tomorrow am to have labs drawn; goal is to be able to have D&C in the near future.  1555 Be dc'd A&Ox4 ambulatory and stable

## 2021-06-13 NOTE — TELEPHONE ENCOUNTER
FNA triage call :  Travel and exposure negative , 2nd covid test on  12/11/20 negative , and negative test  On 11/27/20 but has  SX is shortness of breath started  about 12/1/20 .   Presenting problem :  Pt called. Intermittent , shortness of breath has worsened in the last 24 hours , more tired and didn't sleep because hard to breath . Currently: 1&0, eating and  Homebound activity  , /146  ,    Pulse  120     , No fever or cough .    Guideline used : Covid suspected A AH and Hypertension A OH>   Disposition and recommendations : COVID 19 Nurse Triage Plan/Patient Instructions    Please be aware that novel coronavirus (COVID-19) may be circulating in the community. If you develop symptoms such as fever, cough, or SOB or if you have concerns about the presence of another infection including coronavirus (COVID-19), please contact your health care provider or visit [email or web address].     Disposition/Instructions / Pt declines 911 but will have someone drive her to [facility] ED now instead.    Call to EMS/911 recommended. Follow protocol based instructions.    Bring Your Own Device:  Please also bring your smart device(s) (smart phones, tablets, laptops) and their charging cables for your personal use and to communicate with your care team during your visit.      Thank you for taking steps to prevent the spread of this virus.  o Limit your contact with others.  o Wear a simple mask to cover your cough.  o Wash your hands well and often.    Resources    [facility]: About COVID-19: [email or web address]    CDC: What to Do If You're Sick: www.cdc.gov/coronavirus/2019-ncov/about/steps-when-sick.html    CDC: Ending Home Isolation: www.cdc.gov/coronavirus/2019-ncov/hcp/disposition-in-home-patients.html     CDC: Caring for Someone: www.cdc.gov/coronavirus/2019-ncov/if-you-are-sick/care-for-someone.html     [facility]: Interim Guidance for Hospital Discharge to Home:  www.health.[facility].mn.us/diseases/coronavirus/hcp/hospdischarge.pdf    [facility] clinical trials (COVID-19 research studies): [email or web address]-trials     Below are the COVID-19 hotlines at the [facility] of Health ([facility]). Interpreters are available.   o For health questions: Call 607-776-3452 or 1-189.149.8243 (7 a.m. to 7 p.m.)  o For questions about schools and childcare: Call 595-184-5798 or 1-356.138.5956 (7 a.m. to 7 p.m.)     Caller verbalizes understanding and denies further questions and will call back if further symptoms to triage or questions  . Ella Copeland RN  - [employer] Nurse Advisor     Reason for Disposition    MODERATE difficulty breathing (e.g., speaks in phrases, SOB even at rest, pulse 100-120)    Sounds like a life-threatening emergency to the triager    Additional Information    Negative: SEVERE difficulty breathing (e.g., struggling for each breath, speaks in single words)    Negative: Difficult to awaken or acting confused (e.g., disoriented, slurred speech)    Negative: Bluish (or gray) lips or face now    Negative: Shock suspected (e.g., cold/pale/clammy skin, too weak to stand, low BP, rapid pulse)    Negative: Sounds like a life-threatening emergency to the triager    Negative: [1] COVID-19 exposure AND [2] no symptoms    Negative: [1] Lives with someone known to have influenza (flu test positive) AND [2] flu-like symptoms (e.g., cough, runny nose, sore throat, SOB; with or without fever)    Negative: [1] Adult with possible COVID-19 symptoms AND [2] triager concerned about severity of symptoms or other causes    Negative: Immunization reaction suspected (e.g., fever, headache, muscle aches occurring during days 1-3 days after immunization)    Negative: COVID-19 and breastfeeding, questions about    Negative: SEVERE or constant chest pain or pressure (Exception: mild central chest pain, present only when coughing)    Protocols used: CORONAVIRUS  (COVID-19) DIAGNOSED OR [id number] 12.1.20, HIGH BLOOD PRESSURE-A-OH

## 2021-06-13 NOTE — TELEPHONE ENCOUNTER
New Appointment Needed  What is the reason for the visit:    Inpatient/ED Follow Up Appt Request  At what hospital or facility were you seen?: [facility]  What is the reason you were seen?: acute pulmonary edema  What date were you admitted?: date: 12/14/20  What date were you discharged?: date: 12/18/20  What was the recommended timeframe for your follow up appointment?: 5-7 days  Provider Preference: PCP only  How soon do you need to be seen?: next week  Waitlist offered?: No  Okay to leave a detailed message:  Yes

## 2021-06-13 NOTE — PROGRESS NOTES
Assessment/plan  1. Anemia  2. Iron deficiency anemia due to chronic blood loss  Improved hemogram.   Continue regular diet.   Recheck if bleeding recurs.   If no menses in three months, patient will call or be seen, will start progesterone challenge.   If no menses within six months, advised referral back to ob/gyn.   - HM2(CBC w/o Differential)    3. Hypothyroid  Reviewed blood work.   Not currently on any thyroid medication.   Will monitor closely.   - Thyroid Stimulating Hormone (TSH); Future  - T4, Free  - T3, Total  - Thyroid Stimulating Hormone (TSH)    4. Elevated blood pressure, situational  Attributed to illness.   Advised on recheck at the next visit.     5. Viral syndrome  Discussed supportive care.   Increase clear fluids. Advance diet as tolerated. Consider antihistamine is congestion does not improve. Will avoid decongestants.   Follow up if no change or worsening.             Subjective  [age] year old female here for follow up.   EHR reviewed.   Has history of severe anemia, menorrhagia, abnormal thyroid function.   S/p d and c in April.   She has been well since then though recently sick with congestion, vomiting, cramping, she thinks she is caught her sister's illness.   She has has no menstrual bleeding since April. She denies pelvic pain, abnormal discharge.   She is not taking iron supplementation. Is not taking any thyroid medication.   She wonders what to do about having no menstrual cycle. Due to prior anemia, we discussed taking advantage of this for the next few months.   She is aware of a possible progesterone challenge.   She is working.   Is otherwise well.   We discussed her blood pressure. She thinks she is nervous about her illness.          ROS: 12 systems reviewed, all negative except for what is mentioned in HPI.       Past Medical History:   Diagnosis Date     Anemia     severe, Hbg 7.2 in 1/2016     Endometrial disorder 2016    pelvic u/s showed heterogenous stripe; getting  it biopsied in 2/2016     Hypothyroid     as of 1/2016 bumped up to 50mcg per day     Iron deficiency anemia 4/6/2017     Leiomyoma of uterus 04/13/2017     Overweight      Psoriasis      Patient Active Problem List   Diagnosis     Vaginal Itching     Vaginal Discharge     Seborrheic Dermatitis Of The Scalp     Hypothyroidism, unspecified type     Psoriasis     Lightheadedness     Menorrhagia with regular cycle     Iron deficiency anemia due to chronic blood loss     Iron deficiency anemia     Acute blood loss anemia     Anemia     Leiomyoma of uterus     Past Surgical History:   Procedure Laterality Date     RI HYSTEROSCOPY,W/ENDOMETRIAL ABLATION N/A 4/13/2017    Procedure: HYSTEROSCOPY DILATION AND CURETTAGE, MYOMECTOMY;  Surgeon: Alyse Barnett MD;  Location: [facility] [place];  Service: Gynecology     Family History   Problem Relation Age of Onset     No Medical Problems Mother      No Medical Problems Father      Social History     Social History     Marital status: Single     Spouse name: N/A     Number of children: N/A     Years of education: N/A     Occupational History     Not on file.     Social History Main Topics     Smoking status: Never Smoker     Smokeless tobacco: Never Used     Alcohol use No     Drug use: No     Sexual activity: Yes     Partners: Male     Birth control/ protection: OCP     Other Topics Concern     Not on file     Social History Narrative     Current Outpatient Prescriptions on File Prior to Visit   Medication Sig Dispense Refill     clobetasol (TEMOVATE) 0.05 % ointment Apply 1 application topically as needed.       triamcinolone (KENALOG) 0.5 % ointment Apply 1 application topically as needed.       ferrous gluconate (FERGON) 324 MG tablet Take 1 tablet (324 mg total) by mouth 2 (two) times a day. 60 tablet 11     levothyroxine (SYNTHROID) 50 MCG tablet Take 1 tablet (50 mcg total) by mouth daily. 90 tablet 4     norgestimate-ethinyl estradiol (SPRINTEC, 28,) 0.25-35 mg-mcg  per tablet Take 1 tablet by mouth daily.       No current facility-administered medications on file prior to visit.      Objective  Vitals:    10/06/17 1332   BP: (!) 156/112   Pulse:    Temp:        General Appearance:  Alert, cooperative, no distress, appears stated age   Head:  Normocephalic, without obvious abnormality, atraumatic   Eyes:  PERRL, conjunctiva/corneas clear, EOM's intact   Throat: Lips, mucosa, and tongue normal; teeth and gums normal   Neck: Supple, symmetrical, trachea midline, no adenopathy;  thyroid: not enlarged, symmetric, no tenderness/mass/nodules; no carotid bruit or JVD   Lungs:   Clear to auscultation bilaterally, respirations unlabored   Heart:  Regular rate and rhythm, S1 and S2 normal, no murmur, rub, or gallop   Abdomen:   Soft, non-tender, bowel sounds active all four quadrants,  no masses, no organomegaly   Extremities: Extremities normal, atraumatic, no cyanosis or edema   Skin: Skin color, texture, turgor normal, no rashes or lesions   Lymph nodes: Cervical, supraclavicular nodes normal   Neurologic: Normal, CN 2-12 intact       Recent Results (from the past 240 hour(s))   HM2(CBC w/o Differential)   Result Value Ref Range    WBC 8.6 4.0 - 11.0 thou/uL    RBC 4.85 3.80 - 5.40 mill/uL    Hemoglobin 14.1 12.0 - 16.0 g/dL    Hematocrit 44.5 35.0 - 47.0 %    MCV 92 80 - 100 fL    MCH 29.1 27.0 - 34.0 pg    MCHC 31.7 (L) 32.0 - 36.0 g/dL    RDW 12.1 11.0 - 14.5 %    Platelets 242 140 - 440 thou/uL    MPV 7.9 7.0 - 10.0 fL   T4, Free   Result Value Ref Range    Free T4 0.6 (L) 0.7 - 1.8 ng/dL   T3, Total   Result Value Ref Range    T3, Total 83 45 - 175 ng/dL   Thyroid Stimulating Hormone (TSH)   Result Value Ref Range    TSH 13.70 (H) 0.30 - 5.00 uIU/mL

## 2021-06-14 NOTE — TELEPHONE ENCOUNTER
Her insurance is calling stating they are Unable to reach patient to tell her they are here to help with direction of her care.  They are requesting we let her know at her follow up appt on 1/7

## 2021-06-14 NOTE — PROGRESS NOTES
Hospital Follow-up Visit:    Assessment/Plan:     1. Hospital discharge follow-up  2. Acute systolic congestive heart failure (H)  Patient is feeling better.  Follow-up labs ordered today and I will follow-up with results.  She needs refills of her medications and those were sent.  CHF felt due to uncontrolled thyroid disorder.  I encouraged her to schedule follow-up appointment with cardiology within a week.  She has the phone number and will call to do that.  - Basic Metabolic Panel  - BNP(B-type Natriuretic Peptide)  - lisinopriL (PRINIVIL,ZESTRIL) 2.5 MG tablet; Take 1 tablet (2.5 mg total) by mouth daily.  Dispense: 30 tablet; Refill: 3  - furosemide (LASIX) 20 MG tablet; Take 1 tablet (20 mg total) by mouth daily.  Dispense: 30 tablet; Refill: 3  - carvediloL (COREG) 6.25 MG tablet; Take 1 tablet (6.25 mg total) by mouth 2 (two) times a day.  Dispense: 60 tablet; Refill: 3    3. Hashimoto's thyroiditis  Does not have a follow-up appointment with endocrinology.  Referral placed today.  Follow-up on her labs today.  Continue present medications.  - Ambulatory referral to Endocrinology  - Thyroid Cascade  - levothyroxine (SYNTHROID, LEVOTHROID) 50 MCG tablet; Take 1 tablet (50 mcg total) by mouth Daily at 6:00 am.  Dispense: 30 tablet; Refill: 3      Subjective:     Be Alexis is a 40 y.o. female who presents for a hospital discharge follow up.  She was not feeling well, got tested for Covid on Friday, 12/11/2020.  Covid test was negative.  On Tuesday, December 15, she started feeling a lot worse.  She had dyspnea and difficulty breathing when she was laying down.  She went into the ER.  There she was diagnosed with acute pulmonary edema, hypothyroidism, malignant essential hypertension, ischemic cardiomyopathy, acute systolic congestive heart failure, Hashimoto's thyroiditis.    She notes she is feeling a lot better now.  She has not made any follow-up appointments with cardiology or endocrine or nutrition.  She  notes she is taking all of her medications as directed.  She thinks she may be due for some labs today.  Normal appetite.  No lower extremity edema.    Hospital/Nursing Home/IP Rehab Facility: [facility]  Date of Admission: 12/15/20  Date of Discharge:12/18/20  Reason(s) for Admission:Dyspnea, orthopnea            Do you have any problems taking your medication regularly?  None       Have you had any changes in your medication since discharge? None       Have you had any difficulty following your discharge or treatment plan?  No    Summary of hospitalization:  Hospital discharge summary reviewed  Diagnostic Tests/Treatments reviewed.  Follow up needed: recheck labs, f/u with cardiology and endocrine  Other Healthcare Providers Involved in Patient's Care: Patient Care Team:  Josue Correa MD as PCP - General (Family Medicine)  Hai Bee MD as Assigned PCP      Update since discharge: {improved   Information from family, SNF, care coordination: n/a     Post Discharge Medication Reconciliation: discharge medications reconciled, continue medications without change  Plan of care communicated with: patient    Objective:     Vitals:    01/07/21 1606   BP: 130/82   Pulse: (!) 101   Temp: 97.8  F (36.6  C)   TempSrc: Other   Weight: 140 lb 12 oz (63.8 kg)   LMP: 12/17/2020     Physical Exam:  Cardiac: Regular rate and rhythm, no murmurs  Pulmonary: Lungs clear to auscultation bilaterally, No crackles, rales, rhonchi, or wheezing noted  Extremities: No lower extremity edema noted bilaterally    Wt Readings from Last 3 Encounters:   01/07/21 140 lb 12 oz (63.8 kg)   12/18/20 143 lb 8 oz (65.1 kg)   01/14/19 138 lb (62.6 kg)       Electronically signed by Charisse Padgett PA-C 01/07/21 7:11 PM

## 2021-06-14 NOTE — TELEPHONE ENCOUNTER
Denise from [facility] [place]. pt has been contacted and enrolled with case management [phone]  This is a fyi

## 2021-06-15 PROBLEM — D25.9 LEIOMYOMA OF UTERUS: Status: ACTIVE | Noted: 2017-04-13

## 2021-06-15 PROBLEM — D50.9 IRON DEFICIENCY ANEMIA: Status: ACTIVE | Noted: 2017-04-06

## 2021-06-15 PROBLEM — D64.9 ANEMIA: Status: ACTIVE | Noted: 2017-04-12

## 2021-06-15 NOTE — PROGRESS NOTES
Patient seen in clinic for HF education.  Reviewed HF Binder that includes the  HF Sx Awareness & Action plan  handout and  A Stronger Pump  booklet and Weight log booklet highlighting :  __X_patient s type of heart failure _X__Na management in diet  __X_importance of daily wts  _X__Fluid Guidelines, if applicable  __X_medication review and importance of compliance     Instructed patient in signs and sx of heart failure, reiterated when to call clinic - reviewed HF hotline # [phone] and after hours call # [phone].  Majority of time was spent reviewing: daily weights and weight log, and how to track sodium with labels.  Patient verbalized understanding of HF discussion.  Will continue to reinforce HF management education as needed.

## 2021-06-15 NOTE — TELEPHONE ENCOUNTER
Reason for Call:  Other     Detailed comments:  from [facility] calling to say they were unable to get a hold of pt so they are closing the order.    Phone Number Patient can be reached at: Other phone number:  [phone]    Best Time: no call back needed    Can we leave a detailed message on this number?: no call back needed   Call taken on 2/15/2021 at 8:25 AM by Jahaira Sims

## 2021-06-15 NOTE — TELEPHONE ENCOUNTER

## 2021-06-15 NOTE — PATIENT INSTRUCTIONS - HE
Be Alexis,    It was a pleasure to see you today at the [facility].     My recommendations after this visit include:  - Increase losartan to 25 mg daily.    - Please call the clinic if you have any increased lightheadedness or dizziness with this medication change   - Follow up with me in 2 weeks.   - If you have any questions or concerns, please call [phone] to talk with my nurse.    Radha Sanchez, CNP

## 2021-06-15 NOTE — TELEPHONE ENCOUNTER

## 2021-06-15 NOTE — PATIENT INSTRUCTIONS - HE
Be Alexis,    It was a pleasure to see you today at the [facility].     My recommendations after this visit include:  - Increase carvedilol to 12.5 mg twice daily.    - Please call the clinic if you have any increased lightheadedness or dizziness with this medication change  - Your labs will be on MyChart.  We will call with any concerns.   - Follow up with me in 2-3 weeks.   - You are due to see Dr. Rodriguez after echo, please schedule.   - If you have any questions or concerns, please call [phone] to talk with my nurse.    Radha Sanchez, CNP

## 2021-06-16 PROBLEM — E06.3 HASHIMOTO'S THYROIDITIS: Status: ACTIVE | Noted: 2020-12-16

## 2021-06-16 PROBLEM — I10 MALIGNANT ESSENTIAL HYPERTENSION: Status: ACTIVE | Noted: 2020-12-15

## 2021-06-16 PROBLEM — I50.20 HEART FAILURE WITH REDUCED EJECTION FRACTION, NYHA CLASS II (H): Status: ACTIVE | Noted: 2021-03-15

## 2021-06-16 PROBLEM — I50.22 CHRONIC SYSTOLIC HEART FAILURE (H): Status: ACTIVE | Noted: 2021-03-01

## 2021-06-16 NOTE — PATIENT INSTRUCTIONS - HE
Be Alexis,    It was a pleasure to see you today at the [facility].     My recommendations after this visit include:  - Increase carvedilol to 18.75 mg twice daily (take 3 tablets twice daily)   - Please call the clinic if you have any increased lightheadedness or dizziness with this medication change  - Your labs will be on MyChart.   - If you have any questions or concerns, please call [phone] to talk with my nurse.    Radha Sanchez, CNP

## 2021-06-17 NOTE — TELEPHONE ENCOUNTER
RN cannot approve Refill Request    RN can NOT refill this medication historical medication requested. Last office visit: 1/7/2021 Charisse Padgett PA-C Last Physical: Visit date not found Last MTM visit: Visit date not found Last visit same specialty: 1/7/2021 Charisse Padgett PA-C.  Next visit within 3 mo: Visit date not found  Next physical within 3 mo: Visit date not found      Carlos Arellano, [facility] Triage/Med Refill 5/10/2021    Requested Prescriptions   Pending Prescriptions Disp Refills     levothyroxine (SYNTHROID, LEVOTHROID) 50 MCG tablet [Pharmacy Med Name: LEVOTHYROXINE 0.05MG (50MCG) TAB] 30 tablet 0     Sig: TAKE ONE TABLET BY MOUTH DAILY AT 6AM       Thyroid Hormones Protocol Passed - 5/10/2021  3:41 AM        Passed - Provider visit in past 12 months or next 3 months     Last office visit with prescriber/PCP: 1/7/2021 Charisse Padgett PA-C OR same dept: 1/7/2021 Charisse Padgett PA-C OR same specialty: 1/7/2021 Charisse Padgett PA-C  Last physical: Visit date not found Last MTM visit: Visit date not found   Next visit within 3 mo: Visit date not found  Next physical within 3 mo: Visit date not found  Prescriber OR PCP: Charisse Padgett PA-C  Last diagnosis associated with med order: There are no diagnoses linked to this encounter.  If protocol passes may refill for 12 months if within 3 months of last provider visit (or a total of 15 months).             Passed - TSH on file in past 12 months for patient age 12 & older     TSH   Date Value Ref Range Status   01/07/2021 0.65 0.30 - 5.00 uIU/mL Final

## 2021-06-18 NOTE — PROGRESS NOTES
Feels Hypertension related to stress of work    Hx fibroid and bleed and high bp when stressed.  Had surgery over a year ago and menses just starting back.  No sexually active    Hypertension denies chest pain or headache.  See recent bp.  Some lightheaded    Thyroid disorder trying to be better with meds    ROS: as noted above    OBJECTIVE:   Vitals:    05/17/18 0855   BP: 94/72   Pulse: 100   Resp: 20      Eyes: non icteric, noninflamed  Lungs: no resp distress  Heart: regular  Ankles: no edema  Muscles: nontender  Mental status: euthymic  Neuro: nonfocal    ASSESSMENT/PLAN:    Check bmp after acei start    Light headed/ cut Enalapril 5 mg - hydrochlorothiazide 12.5 mg  In half and take half daily.  Check in three months    Chronic issues stable/ same treatment.     Additional diagnoses and related orders:  1. Hypertension goal BP (blood pressure) < 140/90  Basic Metabolic Panel   2. Lightheadedness     3. Leiomyoma of uterus     4. Hypothyroidism, unspecified type

## 2021-06-18 NOTE — PATIENT INSTRUCTIONS - HE
Patient Instructions by Maria Luisa Rodriguez MD at 2/8/2021  8:50 AM     Author: Maria Luisa Rodriguez MD Service: -- Author Type: Physician    Filed: 2/8/2021  9:26 AM Encounter Date: 2/8/2021 Status: Signed    : Maria Luisa Rodriguez MD (Physician)       Ms. Be Alexis,     It was a pleasure to see you in the office today. My recommendations for you include:   1. Stop lisinopril  2. Start losartan 12.5 mg once a day (half the 25 mg tab)     Please do not hesitate to call the [facility] clinic with any questions or concerns at [phone].    Sincerely,

## 2021-06-18 NOTE — PROGRESS NOTES
In October 2017   Had work stress.  Works at a "[employer] center.    Not taking thyroid meds    dds won't do procedure as bp too high.    On antibiotics and vicodin for the infection    Needs root canal left lower    Not on oc  Not on thyroid    Admits to being a poor med taker and not following through    No chest pain or neuro sxs.    Going on a trip in couple wks    ROS: as noted above    OBJECTIVE:   Vitals:    05/11/18 1457   BP: (!) 150/100   Pulse:    Resp:    Temp:    SpO2:       Eyes: non icteric, noninflamed  Lungs: no resp distress  Heart: regular  Ankles: no edema  Muscles: nontender  Mental status: euthymic  Neuro: nonfocal  Mild left cheek swelling  No teeth tenderness    ASSESSMENT/PLAN:    Will get on her thyroid  Start Hypertension med and check next week.  Check home bp.  If not changing in three days will double and check one week.  She understands she needs labs    When controlled then back to dentist.  On analgesic and antibx    1. Hypothyroidism, unspecified type  levothyroxine (SYNTHROID) 50 MCG tablet   2. High blood pressure  enalapril-hydrochlorothiazide 5-12.5 mg per tablet   3. Apical abscess

## 2021-06-21 NOTE — LETTER
Letter by Davida Morris MD at      Author: Davida Morris MD Service: -- Author Type: --    Filed:  Encounter Date: 12/18/2020 Status: (Other)         December 18, 2020     Patient: Be Alexis   YOB: 1980   Date of Visit: 12/18/2020       To Whom It May Concern:    It is my medical opinion that Be Alexis was hospitalized at [facility] on December 15-18, 2020 and may return to work on 12/21/2020.    If you have any questions or concerns, please don't hesitate to call.    Sincerely,    Electronically signed by Davida Morris MD   [facility]   [phone]   Pager [phone]   [email or web address]

## 2021-06-27 ENCOUNTER — HEALTH MAINTENANCE LETTER (OUTPATIENT)
Age: 41
End: 2021-06-27

## 2021-06-30 NOTE — PROGRESS NOTES
Progress Notes by Radha Lund CNP at 3/30/2021 12:50 PM     Author: Radha Lund CNP Service: -- Author Type: Nurse Practitioner    Filed: 3/30/2021  1:23 PM Encounter Date: 3/30/2021 Status: Signed    : Radha Lund CNP (Nurse Practitioner)             Assessment/Recommendations   Assessment:    1. Nonischemic cardiomyopathy, heart failure with reduced ejection fraction, LVEF 21%, RVEF 45%: She has no symptoms of acute heart failure.  We again discussed the goal medication titration.    2.  Hypertension: Blood pressure controlled today at 112/78.    Plan:  1.   Heart failure medications:  - Beta blocker therapy with carvedilol increased to 18.75 mg twice daily  - ARB therapy with losartan 25 mg daily  - Diuretic therapy with furosemide 20 mg daily   2.  Low-sodium diet and daily weights  3.  BMP pending    Echocardiogram scheduled April 8 and follow-up with Dr. Rodriguez on April 14.  Follow-up with me in early May if ejection fraction remains low.       History of Present Illness/Subjective    Ms. Be Alexis is a 40 y.o. female seen at [facility] today for follow-up.  She has a history of heart failure with reduced ejection fraction, hypothyroidism, and hypertension.  She was hospitalized in December 2020 with acute heart failure exacerbation.  Stress cardiac MRI on December 17, 2020 was negative for ischemia, LVEF 21%, and RVEF 35%.    She was last seen 2 weeks ago and losartan was increased to 25 mg daily.  She denies any side effects with this medication change.  Blood pressure and heart rate are stable.  She has no acute heart failure symptoms.  She denies fatigue, lightheadedness, shortness of breath, dyspnea on exertion, orthopnea, chest pain and lower extremity edema.    Her home weight has remained stable. She is following a low-sodium diet.    ECHO:   Results for orders placed during the hospital encounter of 12/15/20   Echo Complete [ECH10]  12/15/2020    Narrative   Left Ventricle: The calculated left ventricular ejection fraction is   18%. This represents a severely decreased ejection fraction. Cavity is   mildly increased. Moderate hypertrophy noted.    Normal right ventricular size and systolic function.    Estimated central venous pressure equal to 15 mmHg.    No significant valvular heart abnormalities    No previous study for comparison.           Physical Examination Review of Systems   Vitals:    03/30/21 1258   BP: 102/78   Pulse: 80   Resp: 16   SpO2: 98%     Body mass index is 30.31 kg/m .  Wt Readings from Last 3 Encounters:   03/30/21 145 lb (65.8 kg)   03/15/21 146 lb (66.2 kg)   03/01/21 143 lb (64.9 kg)       General Appearance:     Alert, cooperative and in no acute distress.   ENT/Mouth: membranes moist, no oral lesions or bleeding gums.      EYES:  no scleral icterus, normal conjunctivae   Chest/Lungs:   lungs are clear to auscultation, no rales or wheezing, respirations unlabored   Cardiovascular:   Regular. Normal first and second heart sounds, no edema bilateral lower extremities    Abdomen:  Soft, nontender, nondistended, bowel sounds present   Extremities: no cyanosis or clubbing   Skin: warm, dry.    Neurologic: mood and affect are appropriate, alert and oriented x3      General: WNL  Eyes: WNL  Ears/Nose/Throat: WNL  Lungs: WNL  Heart: WNL  Stomach: WNL  Bladder: WNL  Muscle/Joints: WNL  Skin: WNL  Nervous System: WNL  Mental Health: WNL     Blood: WNL     Medical History  Surgical History Family History Social History   Past Medical History:   Diagnosis Date   ? Anemia     severe, Hbg 7.2 in 1/2016   ? Endometrial disorder 2016    pelvic u/s showed heterogenous stripe; getting it biopsied in 2/2016   ? Hypothyroid     as of 1/2016 bumped up to 50mcg per day   ? Iron deficiency anemia 4/6/2017   ? Leiomyoma of uterus 04/13/2017   ? Overweight    ? Psoriasis     Past Surgical History:   Procedure Laterality Date   ? [place]  HYSTEROSCOPY,W/ENDOMETRIAL ABLATION N/A 4/13/2017    Procedure: HYSTEROSCOPY DILATION AND CURETTAGE, MYOMECTOMY;  Surgeon: Alyse Barnett MD;  Location: [facility];  Service: Gynecology    Family History   Problem Relation Age of Onset   ? No Medical Problems Mother    ? No Medical Problems Father     Social History     Socioeconomic History   ? Marital status: Single     Spouse name: Not on file   ? Number of children: Not on file   ? Years of education: Not on file   ? Highest education level: Not on file   Occupational History   ? Not on file   Social Needs   ? Financial resource strain: Not on file   ? Food insecurity     Worry: Not on file     Inability: Not on file   ? Transportation needs     Medical: Not on file     Non-medical: Not on file   Tobacco Use   ? Smoking status: Never Smoker   ? Smokeless tobacco: Never Used   Substance and Sexual Activity   ? Alcohol use: No   ? Drug use: No   ? Sexual activity: Yes     Partners: Male     Birth control/protection: OCP   Lifestyle   ? Physical activity     Days per week: Not on file     Minutes per session: Not on file   ? Stress: Not on file   Relationships   ? Social connections     Talks on phone: Not on file     Gets together: Not on file     Attends [employer] service: Not on file     Active member of club or organization: Not on file     Attends meetings of clubs or organizations: Not on file     Relationship status: Not on file   ? Intimate partner violence     Fear of current or ex partner: Not on file     Emotionally abused: Not on file     Physically abused: Not on file     Forced sexual activity: Not on file   Other Topics Concern   ? Not on file   Social History Narrative   ? Not on file          Medications  Allergies   Current Outpatient Medications   Medication Sig Dispense Refill   ? carvediloL (COREG) 6.25 MG tablet Take 3 tablets (18.75 mg total) by mouth 2 (two) times a day. 540 tablet 3   ? furosemide (LASIX) 20 MG tablet Take 1  tablet (20 mg total) by mouth daily. 90 tablet 3   ? levothyroxine (SYNTHROID, LEVOTHROID) 50 MCG tablet Take 50 mcg by mouth daily.     ? losartan (COZAAR) 25 MG tablet Take 1 tablet (25 mg total) by mouth daily. 90 tablet 3   ? multivitamin with minerals (THERA-M) 9 mg iron-400 mcg Tab tablet Take 1 tablet by mouth daily.  0     No current facility-administered medications for this visit.     Allergies   Allergen Reactions   ? Lisinopril Cough         Lab Results    Chemistry/lipid CBC Cardiac Enzymes/BNP/TSH/INR   Lab Results   Component Value Date    CREATININE 1.16 (H) 03/01/2021    BUN 19 03/01/2021    K 4.0 03/01/2021     (L) 03/01/2021    CL 99 03/01/2021    CO2 26 03/01/2021    Lab Results   Component Value Date    WBC 9.1 12/15/2020    HGB 12.9 12/15/2020    HCT 40.7 12/15/2020    MCV 83 12/15/2020     12/15/2020    Lab Results   Component Value Date    CKTOTAL 78 12/15/2020    TROPONINI 0.01 12/16/2020    BNP 30 01/07/2021    TSH 0.65 01/07/2021    INR 0.96 12/15/2020

## 2021-06-30 NOTE — PROGRESS NOTES
Progress Notes by Maria Luisa Rodriguez MD at 4/14/2021  9:50 AM     Author: Maria Luisa Rodriguez MD Service: -- Author Type: Physician    Filed: 4/14/2021 10:26 AM Encounter Date: 4/14/2021 Status: Signed    : Maria Luisa Rodriguez MD (Physician)           Thank you, Dr. Correa, for asking us to see Be Alexis at the [facility].      Assessment/Recommendations   Assessment:    1.   Heart failure with reduced ejection fraction and biventricular failure: Appears well compensated today  2.   Nonischemic cardiomyopathy with left ventricular ejection fraction 18% and right ventricular dysfunction  with recent echocardiogram showing no improvement in left ventricular ejection fraction to 45%  3.  Hypertension: Well-controlled  Plan:  1.  Increase carvedilol to 25 mg twice daily  2.  Continue losartan 25 mg daily  3.  Continue low-dose Lasix 20 mg daily  4.  Continue regular exercise, low-sodium diet, daily weights  Follow-up in 3 months       History of Present Illness    Ms. Be Alexis is a 40 y.o. female with new diagnosis of biventricular heart failure, hypertensive urgency and untreated hypothyroidism admitted to the hospital in the end of December.  Initially systolic blood pressures in the 220s which have now improved.  No ischemia on stress cardiac MRI however left ventricular ejection fraction 21% and right ventricular ejection fraction 45%.  She is now improved with medical therapy.  She feels much better with improved energy level.  Recent echocardiogram now shows improvement in left ventricular ejection fraction 45%.  She is exercising regularly with her sister and they both been trying to lose weight together.  Recent basic metabolic profiles have shown elevated blood glucose and recommend that she follow-up with her primary for this.    Echocardiogram 4/8/2021    Left ventricle ejection fraction is mildly decreased. The calculated left ventricular ejection fraction is  45%.    Normal right ventricular size. TAPSE is abnormal, which is consistent with abnormal right ventricular systolic function.    No significant valvular abnormalities.    When compared to the previous study dated 12/15/2020, LVEF is higher.    Cardiac MRI stress test 12/17/2020  IMPRESSIONS:    1.  Lexiscan stress cardiac MRI is negative for inducible myocardial ischemia.   2.  Left ventricular cavity size is mildly dilated. There is moderate concentric hypertrophy. Systolic function is severely reduced with global hypokinesis. The calculated left ventricular ejection fraction is 21%.  3.  Right ventricular cavity size is normal with with low normal right ventricular systolic function. The calculated right ventricular ejection fraction is 45%.  4.  No evidence of prior infarction or other focal myocardial scarring or fibrosis.   5.  No significant valvular abnormalities.   6.  Small circumferential pericardial effusion.     Physical Examination Review of Systems   Vitals:    04/14/21 0954   BP: 110/70   Pulse: 80   Resp: 16     Body mass index is 30.31 kg/m .  Wt Readings from Last 3 Encounters:   04/14/21 145 lb (65.8 kg)   04/08/21 145 lb (65.8 kg)   03/30/21 145 lb (65.8 kg)       General Appearance:   alert, no apparent distress   HEENT:  no scleral icterus; the mucous membranes are pink and moist                                  Neck: No jvd   Chest: the spine is straight and the chest is symmetric   Lungs:   respirations unlabored; the lungs are clear to auscultation   Cardiovascular:   regular rhythm with normal first and second heart sounds and no murmurs or gallops   Abdomen:  no organomegaly, masses, bruits, or tenderness; bowel sounds are present   Extremities: no cyanosis, clubbing, or edema   Skin: no xanthelasma    General: WNL  Eyes: WNL  Ears/Nose/Throat: WNL  Lungs: WNL  Heart: WNL  Stomach: WNL  Bladder: WNL  Muscle/Joints: WNL  Skin: WNL  Nervous System: WNL  Mental Health: WNL     Blood: WNL      Medical History  Surgical History Family History Social History   Past Medical History:   Diagnosis Date   ? Anemia     severe, Hbg 7.2 in 1/2016   ? Endometrial disorder 2016    pelvic u/s showed heterogenous stripe; getting it biopsied in 2/2016   ? Hypothyroid     as of 1/2016 bumped up to 50mcg per day   ? Iron deficiency anemia 4/6/2017   ? Leiomyoma of uterus 04/13/2017   ? Overweight    ? Psoriasis     Past Surgical History:   Procedure Laterality Date   ? [place] HYSTEROSCOPY,W/ENDOMETRIAL ABLATION N/A 4/13/2017    Procedure: HYSTEROSCOPY DILATION AND CURETTAGE, MYOMECTOMY;  Surgeon: Alyse Barnett MD;  Location: [facility];  Service: Gynecology    Family History   Problem Relation Age of Onset   ? No Medical Problems Mother    ? No Medical Problems Father     Social History     Socioeconomic History   ? Marital status: Single     Spouse name: Not on file   ? Number of children: Not on file   ? Years of education: Not on file   ? Highest education level: Not on file   Occupational History   ? Not on file   Social Needs   ? Financial resource strain: Not on file   ? Food insecurity     Worry: Not on file     Inability: Not on file   ? Transportation needs     Medical: Not on file     Non-medical: Not on file   Tobacco Use   ? Smoking status: Never Smoker   ? Smokeless tobacco: Never Used   Substance and Sexual Activity   ? Alcohol use: No   ? Drug use: No   ? Sexual activity: Yes     Partners: Male     Birth control/protection: OCP   Lifestyle   ? Physical activity     Days per week: Not on file     Minutes per session: Not on file   ? Stress: Not on file   Relationships   ? Social connections     Talks on phone: Not on file     Gets together: Not on file     Attends [employer] service: Not on file     Active member of club or organization: Not on file     Attends meetings of clubs or organizations: Not on file     Relationship status: Not on file   ? Intimate partner violence     Fear of current  or ex partner: Not on file     Emotionally abused: Not on file     Physically abused: Not on file     Forced sexual activity: Not on file   Other Topics Concern   ? Not on file   Social History Narrative   ? Not on file          Medications  Allergies   Current Outpatient Medications   Medication Sig Dispense Refill   ? carvediloL (COREG) 6.25 MG tablet Take 3 tablets (18.75 mg total) by mouth 2 (two) times a day. 540 tablet 3   ? furosemide (LASIX) 20 MG tablet Take 1 tablet (20 mg total) by mouth daily. 90 tablet 3   ? levothyroxine (SYNTHROID, LEVOTHROID) 50 MCG tablet Take 50 mcg by mouth daily.     ? losartan (COZAAR) 25 MG tablet Take 1 tablet (25 mg total) by mouth daily. 90 tablet 3   ? multivitamin with minerals (THERA-M) 9 mg iron-400 mcg Tab tablet Take 1 tablet by mouth daily.  0     No current facility-administered medications for this visit.       Allergies   Allergen Reactions   ? Lisinopril Cough         Lab Results    Chemistry/lipid CBC Cardiac Enzymes/BNP/TSH/INR   Lab Results   Component Value Date    CREATININE 1.08 03/30/2021    BUN 17 03/30/2021    K 4.2 03/30/2021     03/30/2021     03/30/2021    CO2 30 03/30/2021    Lab Results   Component Value Date    WBC 9.1 12/15/2020    HGB 12.9 12/15/2020    HCT 40.7 12/15/2020    MCV 83 12/15/2020     12/15/2020    Lab Results   Component Value Date    CKTOTAL 78 12/15/2020    TROPONINI 0.01 12/16/2020    BNP 30 01/07/2021    TSH 0.65 01/07/2021    INR 0.96 12/15/2020

## 2021-06-30 NOTE — PROGRESS NOTES
Progress Notes by Maria Luisa Rodriguez MD at 2/8/2021  8:50 AM     Author: Maria Luisa Rodriguez MD Service: -- Author Type: Physician    Filed: 2/10/2021 10:50 AM Encounter Date: 2/8/2021 Status: Signed    : Maria Luisa Rodriguez MD (Physician)           Thank you, Dr. Correa, for asking us to see Be Alexis at the [facility].      Assessment/Recommendations   Assessment:    1.   Heart failure with reduced ejection fraction and biventricular failure: Appears well compensated today  2.   Nonischemic cardiomyopathy with left ventricular ejection fraction 18% and right ventricular dysfunction   3.  Hypertension     Plan:  1.    Will stop lisinopril due to the dry cough and start losartan 12.5 mg daily.  We will have her follow-up with our heart failure clinic for further medication titration.  2.    Continue carvedilol 6.25 mg twice daily  3.  Continue on Lasix 20 mg daily  4.    Monitor weights, low-sodium diet.  5.  Recheck echocardiogram in 2 months time and follow-up with me.  In the interim she will be seen in the heart failure clinic.       History of Present Illness    Ms. Be Alexis is a 40 y.o. female who was admitted to the hospital at the end of December with newly diagnosed biventricular heart failure, untreated hypothyroidism and hypertensive urgency.  She was diuresed and blood pressure well controlled.  BNP was 379 with negative troponin.  Systolic pressure in the 220s initially.  She is now improved with medical therapy stress cardiac MRI was negative for inducible ischemia but did show a left ventricular ejection fraction of 21% and right ventricular ejection fraction 45%.  She overall feels much better with improvement in her breathing.  No edema and denies chest pain.  She has noticed a dry cough.    Lexiscan cardiac stress MRI 12/17/2020  IMPRESSIONS:    1.  Lexiscan stress cardiac MRI is negative for inducible myocardial ischemia.   2.  Left ventricular cavity  size is mildly dilated. There is moderate concentric hypertrophy. Systolic function is severely reduced with global hypokinesis. The calculated left ventricular ejection fraction is 21%.  3.  Right ventricular cavity size is normal with with low normal right ventricular systolic function. The calculated right ventricular ejection fraction is 45%.  4.  No evidence of prior infarction or other focal myocardial scarring or fibrosis.   5.  No significant valvular abnormalities.   6.  Small circumferential pericardial effusion.       Physical Examination Review of Systems   Vitals:    02/08/21 0858   BP: 118/86   Pulse: 98   Resp: 14   SpO2: 98%     Body mass index is 30.1 kg/m .  Wt Readings from Last 3 Encounters:   02/08/21 144 lb (65.3 kg)   01/07/21 140 lb 12 oz (63.8 kg)   12/18/20 143 lb 8 oz (65.1 kg)       General Appearance:   alert, no apparent distress   HEENT:  no scleral icterus; the mucous membranes are pink and moist                                  Neck: No jvd   Chest: the spine is straight and the chest is symmetric   Lungs:   respirations unlabored; the lungs are clear to auscultation   Cardiovascular:   regular rhythm with normal first and second heart sounds and no murmurs or gallops   Abdomen:  no organomegaly, masses, bruits, or tenderness; bowel sounds are present   Extremities: no edema   Skin: no xanthelasma    General: Weight Gain  Eyes: WNL  Ears/Nose/Throat: WNL  Lungs: Cough  Heart: WNL  Stomach: WNL  Bladder: WNL  Muscle/Joints: WNL  Skin: WNL  Nervous System: WNL  Mental Health: WNL     Blood: WNL     Medical History  Surgical History Family History Social History   Past Medical History:   Diagnosis Date   ? Anemia     severe, Hbg 7.2 in 1/2016   ? Endometrial disorder 2016    pelvic u/s showed heterogenous stripe; getting it biopsied in 2/2016   ? Hypothyroid     as of 1/2016 bumped up to 50mcg per day   ? Iron deficiency anemia 4/6/2017   ? Leiomyoma of uterus 04/13/2017   ? Overweight     ? Psoriasis     Past Surgical History:   Procedure Laterality Date   ? [place] HYSTEROSCOPY,W/ENDOMETRIAL ABLATION N/A 4/13/2017    Procedure: HYSTEROSCOPY DILATION AND CURETTAGE, MYOMECTOMY;  Surgeon: Alyse Barnett MD;  Location: [facility];  Service: Gynecology    Family History   Problem Relation Age of Onset   ? No Medical Problems Mother    ? No Medical Problems Father     Social History     Socioeconomic History   ? Marital status: Single     Spouse name: Not on file   ? Number of children: Not on file   ? Years of education: Not on file   ? Highest education level: Not on file   Occupational History   ? Not on file   Social Needs   ? Financial resource strain: Not on file   ? Food insecurity     Worry: Not on file     Inability: Not on file   ? Transportation needs     Medical: Not on file     Non-medical: Not on file   Tobacco Use   ? Smoking status: Never Smoker   ? Smokeless tobacco: Never Used   Substance and Sexual Activity   ? Alcohol use: No   ? Drug use: No   ? Sexual activity: Yes     Partners: Male     Birth control/protection: OCP   Lifestyle   ? Physical activity     Days per week: Not on file     Minutes per session: Not on file   ? Stress: Not on file   Relationships   ? Social connections     Talks on phone: Not on file     Gets together: Not on file     Attends [employer] service: Not on file     Active member of club or organization: Not on file     Attends meetings of clubs or organizations: Not on file     Relationship status: Not on file   ? Intimate partner violence     Fear of current or ex partner: Not on file     Emotionally abused: Not on file     Physically abused: Not on file     Forced sexual activity: Not on file   Other Topics Concern   ? Not on file   Social History Narrative   ? Not on file          Medications  Allergies   Current Outpatient Medications   Medication Sig Dispense Refill   ? aspirin-acetaminophen-caffeine (EXCEDRIN MIGRAINE) 250-250-65 mg per tablet  Take 1 tablet by mouth every 6 (six) hours as needed for pain.     ? carvediloL (COREG) 6.25 MG tablet Take 1 tablet (6.25 mg total) by mouth 2 (two) times a day. 60 tablet 3   ? furosemide (LASIX) 20 MG tablet Take 1 tablet (20 mg total) by mouth daily. 30 tablet 3   ? levothyroxine (SYNTHROID, LEVOTHROID) 50 MCG tablet Take 50 mcg by mouth daily.     ? multivitamin with minerals (THERA-M) 9 mg iron-400 mcg Tab tablet Take 1 tablet by mouth daily.  0   ? acetaminophen (TYLENOL) 500 MG tablet Take 1,000 mg by mouth every 6 (six) hours as needed for pain.     ? losartan (COZAAR) 25 MG tablet Take 0.5 tablets (12.5 mg total) by mouth daily. 90 tablet 3     No current facility-administered medications for this visit.       No Known Allergies      Lab Results    Chemistry/lipid CBC Cardiac Enzymes/BNP/TSH/INR   Lab Results   Component Value Date    CREATININE 1.17 (H) 01/07/2021    BUN 21 01/07/2021    K 4.1 01/07/2021     01/07/2021     01/07/2021    CO2 27 01/07/2021    Lab Results   Component Value Date    WBC 9.1 12/15/2020    HGB 12.9 12/15/2020    HCT 40.7 12/15/2020    MCV 83 12/15/2020     12/15/2020    Lab Results   Component Value Date    CKTOTAL 78 12/15/2020    TROPONINI 0.01 12/16/2020    BNP 30 01/07/2021    TSH 0.65 01/07/2021    INR 0.96 12/15/2020

## 2021-06-30 NOTE — PROGRESS NOTES
Progress Notes by Radha Lund CNP at 3/15/2021  8:30 AM     Author: Radha Lund CNP Service: -- Author Type: Nurse Practitioner    Filed: 3/15/2021  9:06 AM Encounter Date: 3/15/2021 Status: Signed    : Radha Lund CNP (Nurse Practitioner)             Assessment/Recommendations   Assessment:    1. Nonischemic cardiomyopathy, heart failure with reduced ejection fraction, LVEF 21%, RVEF 45%: She has no symptoms of acute heart failure.  We again discussed the goal medication titration.    2.  Hypertension: Blood pressure controlled today at 104/74.    3.  Hypothyroidism: Continue levothyroxine.    Plan:  1.   Heart failure medications:  - Beta blocker therapy with carvedilol 12.5 mg twice daily  - ARB therapy with losartan increased to 25 mg daily  - Diuretic therapy with furosemide 20 mg daily   2.  Low-sodium diet and daily weights    Be Alexis will follow up with me in 2 weeks to continue medication titration.  Echocardiogram scheduled April 8 and follow-up with Dr. Rodriguez on April 14.       History of Present Illness/Subjective    Ms. Be Alexis is a 40 y.o. female seen at [facility] today for follow-up.  She has a history of heart failure with reduced ejection fraction, hypothyroidism, and hypertension.  She was hospitalized in December 2020 with acute heart failure exacerbation.  Stress cardiac MRI on December 17, 2020 was negative for ischemia, LVEF 21%, and RVEF 35%.    She was last seen 2 weeks ago and carvedilol was increased to 12.5 mg twice daily.  She denies any side effects with this medication change.  She denies fatigue, lightheadedness, shortness of breath, dyspnea on exertion, orthopnea, chest pain and lower extremity edema.      Her home weight has remained stable. She is following a low-sodium diet.      ECHO:   Results for orders placed during the hospital encounter of 12/15/20   Echo Complete [ECH10] 12/15/2020    Narrative    Left Ventricle: The calculated left ventricular ejection fraction is   18%. This represents a severely decreased ejection fraction. Cavity is   mildly increased. Moderate hypertrophy noted.    Normal right ventricular size and systolic function.    Estimated central venous pressure equal to 15 mmHg.    No significant valvular heart abnormalities    No previous study for comparison.           Physical Examination Review of Systems   Vitals:    03/15/21 0844   BP: 104/74   Pulse: 79   Resp: 16   SpO2: 98%     Body mass index is 30.51 kg/m .  Wt Readings from Last 3 Encounters:   03/15/21 146 lb (66.2 kg)   03/01/21 143 lb (64.9 kg)   02/08/21 144 lb (65.3 kg)       General Appearance:     Alert, cooperative and in no acute distress.   ENT/Mouth: membranes moist, no oral lesions or bleeding gums.      EYES:  no scleral icterus, normal conjunctivae   Chest/Lungs:   lungs are clear to auscultation, no rales or wheezing, respirations unlabored   Cardiovascular:   Regular. Normal first and second heart sounds, no edema bilateral lower extremities    Abdomen:  Soft, nontender, nondistended, bowel sounds present   Extremities: no cyanosis or clubbing   Skin: warm, dry.    Neurologic: mood and affect are appropriate, alert and oriented x3      General: WNL  Eyes: WNL  Ears/Nose/Throat: WNL  Lungs: WNL  Heart: WNL  Stomach: WNL  Bladder: WNL  Muscle/Joints: WNL  Skin: WNL  Nervous System: WNL  Mental Health: WNL     Blood: WNL     Medical History  Surgical History Family History Social History   Past Medical History:   Diagnosis Date   ? Anemia     severe, Hbg 7.2 in 1/2016   ? Endometrial disorder 2016    pelvic u/s showed heterogenous stripe; getting it biopsied in 2/2016   ? Hypothyroid     as of 1/2016 bumped up to 50mcg per day   ? Iron deficiency anemia 4/6/2017   ? Leiomyoma of uterus 04/13/2017   ? Overweight    ? Psoriasis     Past Surgical History:   Procedure Laterality Date   ? [place] HYSTEROSCOPY,W/ENDOMETRIAL  ABLATION N/A 4/13/2017    Procedure: HYSTEROSCOPY DILATION AND CURETTAGE, MYOMECTOMY;  Surgeon: Alyse Barnett MD;  Location: [facility];  Service: Gynecology    Family History   Problem Relation Age of Onset   ? No Medical Problems Mother    ? No Medical Problems Father     Social History     Socioeconomic History   ? Marital status: Single     Spouse name: Not on file   ? Number of children: Not on file   ? Years of education: Not on file   ? Highest education level: Not on file   Occupational History   ? Not on file   Social Needs   ? Financial resource strain: Not on file   ? Food insecurity     Worry: Not on file     Inability: Not on file   ? Transportation needs     Medical: Not on file     Non-medical: Not on file   Tobacco Use   ? Smoking status: Never Smoker   ? Smokeless tobacco: Never Used   Substance and Sexual Activity   ? Alcohol use: No   ? Drug use: No   ? Sexual activity: Yes     Partners: Male     Birth control/protection: OCP   Lifestyle   ? Physical activity     Days per week: Not on file     Minutes per session: Not on file   ? Stress: Not on file   Relationships   ? Social connections     Talks on phone: Not on file     Gets together: Not on file     Attends Religion service: Not on file     Active member of club or organization: Not on file     Attends meetings of clubs or organizations: Not on file     Relationship status: Not on file   ? Intimate partner violence     Fear of current or ex partner: Not on file     Emotionally abused: Not on file     Physically abused: Not on file     Forced sexual activity: Not on file   Other Topics Concern   ? Not on file   Social History Narrative   ? Not on file          Medications  Allergies   Current Outpatient Medications   Medication Sig Dispense Refill   ? carvediloL (COREG) 6.25 MG tablet Take 2 tablets (12.5 mg total) by mouth 2 (two) times a day. 360 tablet 3   ? levothyroxine (SYNTHROID, LEVOTHROID) 50 MCG tablet Take 50 mcg by  mouth daily.     ? losartan (COZAAR) 25 MG tablet Take 1 tablet (25 mg total) by mouth daily. 90 tablet 3   ? multivitamin with minerals (THERA-M) 9 mg iron-400 mcg Tab tablet Take 1 tablet by mouth daily.  0   ? furosemide (LASIX) 20 MG tablet Take 1 tablet (20 mg total) by mouth daily. 90 tablet 3     No current facility-administered medications for this visit.     Allergies   Allergen Reactions   ? Lisinopril Cough         Lab Results    Chemistry/lipid CBC Cardiac Enzymes/BNP/TSH/INR   Lab Results   Component Value Date    CREATININE 1.16 (H) 03/01/2021    BUN 19 03/01/2021    K 4.0 03/01/2021     (L) 03/01/2021    CL 99 03/01/2021    CO2 26 03/01/2021    Lab Results   Component Value Date    WBC 9.1 12/15/2020    HGB 12.9 12/15/2020    HCT 40.7 12/15/2020    MCV 83 12/15/2020     12/15/2020    Lab Results   Component Value Date    CKTOTAL 78 12/15/2020    TROPONINI 0.01 12/16/2020    BNP 30 01/07/2021    TSH 0.65 01/07/2021    INR 0.96 12/15/2020

## 2021-06-30 NOTE — PROGRESS NOTES
Progress Notes by Radha Lund CNP at 3/1/2021 10:30 AM     Author: Radha Lund CNP Service: -- Author Type: Nurse Practitioner    Filed: 3/1/2021 11:28 AM Encounter Date: 3/1/2021 Status: Signed    : Radha Lund CNP (Nurse Practitioner)             Assessment/Recommendations   Assessment:    1. Nonischemic cardiomyopathy, heart failure with reduced ejection fraction, LVEF 21%, RVEF 45%: She has no symptoms of acute heart failure. We reviewed heart failure diagnosis, medications, treatment plan, low sodium diet, weight monitoring, and symptom monitoring.  She met with a heart failure nurse clinician to further discuss.  We discussed the goal of medication titration for decreased ejection fraction.    2.  Hypertension: Blood pressure controlled today at 130/78.    3.  Hypothyroidism: Continue levothyroxine.    Plan:  1.   Heart failure medications:  - Beta blocker therapy with carvedilol increased to 12.5 mg twice daily  - ARB therapy with losartan 12.5 mg daily  - Diuretic therapy with furosemide 20 mg daily   2.  BMP pending  3.  Low-sodium diet and daily weights    Be Alexis will follow up with me in 2 to 3 weeks to continue medication titration.  Scheduled April 8 and follow-up with Dr. Rodriguez after echo.       History of Present Illness/Subjective    Ms. Be Alexis is a 40 y.o. female seen at [facility] today for follow-up.  She has a history of heart failure with reduced ejection fraction, hypothyroidism, and hypertension.  She was hospitalized in December 2020 with acute heart failure exacerbation.  Stress cardiac MRI on December 17, 2020 was negative for ischemia, LVEF 21%, and RVEF 35%.    She was last seen by Dr. Rodriguez on February 8.  Lisinopril was changed to losartan due to a cough.  Cough has resolved.  She has no symptoms of acute heart failure.  She denies fatigue, lightheadedness, shortness of breath, dyspnea on exertion, orthopnea,  chest pain and lower extremity edema.      Her home weight has remained stable.  She is exercising daily by walking up and down flights of stairs at her home.  She plans on getting a treadmill.  She is following a low-sodium diet.      ECHO:   Results for orders placed during the hospital encounter of 12/15/20   Echo Complete [ECH10] 12/15/2020    Narrative   Left Ventricle: The calculated left ventricular ejection fraction is   18%. This represents a severely decreased ejection fraction. Cavity is   mildly increased. Moderate hypertrophy noted.    Normal right ventricular size and systolic function.    Estimated central venous pressure equal to 15 mmHg.    No significant valvular heart abnormalities    No previous study for comparison.           Physical Examination Review of Systems   Vitals:    03/01/21 1035   BP: 110/78   Pulse: 96   Resp: 16     Body mass index is 29.89 kg/m .  Wt Readings from Last 3 Encounters:   03/01/21 143 lb (64.9 kg)   02/08/21 144 lb (65.3 kg)   01/07/21 140 lb 12 oz (63.8 kg)       General Appearance:     Alert, cooperative and in no acute distress.   ENT/Mouth: membranes moist, no oral lesions or bleeding gums.      EYES:  no scleral icterus, normal conjunctivae   Chest/Lungs:   lungs are clear to auscultation, no rales or wheezing, respirations unlabored   Cardiovascular:   Regular. Normal first and second heart sounds, no edema bilateral lower extremities    Abdomen:  Soft, nontender, nondistended, bowel sounds present   Extremities: no cyanosis or clubbing   Skin: warm, dry.    Neurologic: mood and affect are appropriate, alert and oriented x3      General: WNL  Eyes: WNL  Ears/Nose/Throat: WNL  Lungs: WNL  Heart: WNL  Stomach: WNL  Bladder: WNL  Muscle/Joints: WNL  Skin: WNL  Nervous System: WNL  Mental Health: WNL     Blood: WNL     Medical History  Surgical History Family History Social History   Past Medical History:   Diagnosis Date   ? Anemia     severe, Hbg 7.2 in 1/2016   ?  Endometrial disorder 2016    pelvic u/s showed heterogenous stripe; getting it biopsied in 2/2016   ? Hypothyroid     as of 1/2016 bumped up to 50mcg per day   ? Iron deficiency anemia 4/6/2017   ? Leiomyoma of uterus 04/13/2017   ? Overweight    ? Psoriasis     Past Surgical History:   Procedure Laterality Date   ? [place] HYSTEROSCOPY,W/ENDOMETRIAL ABLATION N/A 4/13/2017    Procedure: HYSTEROSCOPY DILATION AND CURETTAGE, MYOMECTOMY;  Surgeon: Alyse Barnett MD;  [place] [facility] [place]  Service: Gynecology    Family History   Problem Relation Age of Onset   ? No Medical Problems Mother    ? No Medical Problems Father     Social History     Socioeconomic History   ? Marital status: Single     Spouse name: Not on file   ? Number of children: Not on file   ? Years of education: Not on file   ? Highest education level: Not on file   Occupational History   ? Not on file   Social Needs   ? Financial resource strain: Not on file   ? Food insecurity     Worry: Not on file     Inability: Not on file   ? Transportation needs     Medical: Not on file     Non-medical: Not on file   Tobacco Use   ? Smoking status: Never Smoker   ? Smokeless tobacco: Never Used   Substance and Sexual Activity   ? Alcohol use: No   ? Drug use: No   ? Sexual activity: Yes     Partners: Male     Birth control/protection: OCP   Lifestyle   ? Physical activity     Days per week: Not on file     Minutes per session: Not on file   ? Stress: Not on file   Relationships   ? Social connections     Talks on phone: Not on file     Gets together: Not on file     Attends Jewish service: Not on file     Active member of club or organization: Not on file     Attends meetings of clubs or organizations: Not on file     Relationship status: Not on file   ? Intimate partner violence     Fear of current or ex partner: Not on file     Emotionally abused: Not on file     Physically abused: Not on file     Forced sexual activity: Not on file   Other Topics  Concern   ? Not on file   Social History Narrative   ? Not on file          Medications  Allergies   Current Outpatient Medications   Medication Sig Dispense Refill   ? carvediloL (COREG) 6.25 MG tablet Take 2 tablets (12.5 mg total) by mouth 2 (two) times a day. 360 tablet 3   ? furosemide (LASIX) 20 MG tablet Take 1 tablet (20 mg total) by mouth daily. 30 tablet 3   ? levothyroxine (SYNTHROID, LEVOTHROID) 50 MCG tablet Take 50 mcg by mouth daily.     ? losartan (COZAAR) 25 MG tablet Take 0.5 tablets (12.5 mg total) by mouth daily. 90 tablet 3   ? multivitamin with minerals (THERA-M) 9 mg iron-400 mcg Tab tablet Take 1 tablet by mouth daily.  0     No current facility-administered medications for this visit.     Allergies   Allergen Reactions   ? Lisinopril Cough         Lab Results    Chemistry/lipid CBC Cardiac Enzymes/BNP/TSH/INR   Lab Results   Component Value Date    CREATININE 1.06 02/08/2021    BUN 20 02/08/2021    K 4.0 02/08/2021     02/08/2021     02/08/2021    CO2 25 02/08/2021    Lab Results   Component Value Date    WBC 9.1 12/15/2020    HGB 12.9 12/15/2020    HCT 40.7 12/15/2020    MCV 83 12/15/2020     12/15/2020    Lab Results   Component Value Date    CKTOTAL 78 12/15/2020    TROPONINI 0.01 12/16/2020    BNP 30 01/07/2021    TSH 0.65 01/07/2021    INR 0.96 12/15/2020

## 2021-07-03 NOTE — ADDENDUM NOTE
Addendum Note by Madalyn Calero CMA at 4/6/2017 10:53 AM     Author: Madalyn Calero CMA Service: -- Author Type: Medical Assistant    Filed: 4/6/2017 10:53 AM Encounter Date: 4/5/2017 Status: Signed    : Madalyn Calero CMA (Medical Assistant)    Addended by: MADALYN CALERO on: 4/6/2017 10:53 AM        Modules accepted: Orders

## 2021-07-03 NOTE — ADDENDUM NOTE
Addendum Note by Ana Correa MD at 4/6/2017 11:15 AM     Author: Ana Correa MD Service: -- Author Type: Physician    Filed: 4/6/2017 11:15 AM Encounter Date: 4/5/2017 Status: Signed    : Ana Correa MD (Physician)    Addended by: ANA CORREA on: 4/6/2017 11:15 AM        Modules accepted: Orders

## 2021-07-06 ENCOUNTER — COMMUNICATION - HEALTHEAST (OUTPATIENT)
Dept: FAMILY MEDICINE | Facility: CLINIC | Age: 41
End: 2021-07-06

## 2021-07-06 DIAGNOSIS — E06.3 HYPOTHYROIDISM DUE TO HASHIMOTO'S THYROIDITIS: ICD-10-CM

## 2021-07-06 RX ORDER — LEVOTHYROXINE SODIUM 50 UG/1
TABLET ORAL
Qty: 90 TABLET | Refills: 2 | Status: SHIPPED | OUTPATIENT
Start: 2021-07-06 | End: 2021-12-16

## 2021-07-06 NOTE — TELEPHONE ENCOUNTER
Telephone Encounter by Carlos Arellano, RN at 7/6/2021  9:10 AM     Author: Carlos Arellano, RN Service: -- Author Type: Registered Nurse    Filed: 7/6/2021  9:10 AM Encounter Date: 7/6/2021 Status: Signed    : Carlos Arellano, RN (Registered Nurse)       Refill Approved    Rx renewed per Medication Renewal Policy. Medication was last renewed on 6/9/21.    Carlos Arellano, [facility] Triage/Med Refill 7/6/2021     Requested Prescriptions   Pending Prescriptions Disp Refills   ? levothyroxine (SYNTHROID, LEVOTHROID) 50 MCG tablet [Pharmacy Med Name: LEVOTHYROXINE 0.05MG (50MCG) TAB] 90 tablet 0     Sig: TAKE 1 TABLET BY MOUTH DAILY AT 6 AM       Thyroid Hormones Protocol Passed - 7/6/2021  3:43 AM        Passed - Provider visit in past 12 months or next 3 months     Last office visit with prescriber/PCP: 10/6/2017 Josue Correa MD OR same dept: 1/7/2021 Charisse Padgett PA-C OR same specialty: 1/7/2021 Charisse Padgett PA-C  Last physical: Visit date not found Last MTM visit: Visit date not found   Next visit within 3 mo: Visit date not found  Next physical within 3 mo: Visit date not found  Prescriber OR PCP: Josue Correa MD  Last diagnosis associated with med order: 1. Hypothyroidism due to Hashimoto's thyroiditis  - levothyroxine (SYNTHROID, LEVOTHROID) 50 MCG tablet [Pharmacy Med Name: LEVOTHYROXINE 0.05MG (50MCG) TAB]; TAKE 1 TABLET BY MOUTH DAILY AT 6 AM  Dispense: 90 tablet; Refill: 0    If protocol passes may refill for 12 months if within 3 months of last provider visit (or a total of 15 months).             Passed - TSH on file in past 12 months for patient age 12 & older     TSH   Date Value Ref Range Status   01/07/2021 0.65 0.30 - 5.00 uIU/mL Final

## 2021-09-30 ENCOUNTER — MYC MEDICAL ADVICE (OUTPATIENT)
Dept: CARDIOLOGY | Facility: CLINIC | Age: 41
End: 2021-09-30

## 2021-09-30 DIAGNOSIS — I50.22 CHRONIC SYSTOLIC HEART FAILURE (H): ICD-10-CM

## 2021-10-17 ENCOUNTER — HEALTH MAINTENANCE LETTER (OUTPATIENT)
Age: 41
End: 2021-10-17

## 2021-12-16 ENCOUNTER — OFFICE VISIT (OUTPATIENT)
Dept: CARDIOLOGY | Facility: CLINIC | Age: 41
End: 2021-12-16
Payer: COMMERCIAL

## 2021-12-16 VITALS
HEIGHT: 59 IN | DIASTOLIC BLOOD PRESSURE: 70 MMHG | WEIGHT: 148 LBS | SYSTOLIC BLOOD PRESSURE: 102 MMHG | BODY MASS INDEX: 29.84 KG/M2 | HEART RATE: 85 BPM | RESPIRATION RATE: 16 BRPM

## 2021-12-16 DIAGNOSIS — I42.8 NONISCHEMIC CARDIOMYOPATHY (H): ICD-10-CM

## 2021-12-16 DIAGNOSIS — I50.22 CHRONIC SYSTOLIC HEART FAILURE (H): Primary | ICD-10-CM

## 2021-12-16 DIAGNOSIS — I10 BENIGN ESSENTIAL HYPERTENSION: ICD-10-CM

## 2021-12-16 PROCEDURE — 99214 OFFICE O/P EST MOD 30 MIN: CPT | Performed by: GENERAL ACUTE CARE HOSPITAL

## 2021-12-16 RX ORDER — METOPROLOL SUCCINATE 50 MG/1
50 TABLET, EXTENDED RELEASE ORAL DAILY
Qty: 90 TABLET | Refills: 3 | Status: SHIPPED | OUTPATIENT
Start: 2021-12-16 | End: 2021-12-24

## 2021-12-16 RX ORDER — HYDRALAZINE HYDROCHLORIDE 25 MG/1
25 TABLET, FILM COATED ORAL 3 TIMES DAILY
Qty: 270 TABLET | Refills: 3 | Status: SHIPPED | OUTPATIENT
Start: 2021-12-16 | End: 2022-06-15

## 2021-12-16 RX ORDER — ISOSORBIDE DINITRATE 10 MG/1
10 TABLET ORAL
Qty: 270 TABLET | Refills: 3 | Status: SHIPPED | OUTPATIENT
Start: 2021-12-16 | End: 2022-06-15

## 2021-12-16 ASSESSMENT — MIFFLIN-ST. JEOR: SCORE: 1246.95

## 2021-12-16 NOTE — PATIENT INSTRUCTIONS
1. We will switch your carvedilol to metoprolol 50 mg once daily.  2. We will switch your losartan to a combination of hydralazine 25 mg three times a day and isosorbide dinitrate 10 mg three times a day.  3. See me back in 2-3 months.

## 2021-12-16 NOTE — LETTER
12/16/2021    Josue Correa MD  [address] [place] [address]    RE: Be Alexis       Dear Colleague,     I had the pleasure of seeing Be Alexis in the [facility].  HEART CARE ENCOUNTER NOTE          Assessment/Recommendations   Assessment:    1. Chronic congestive heart failure with reduced left ventricular ejection fraction since improved to 45% due to nonischemic cardiomyopathy. Unclear cause, but hypertensive heart disease, viral myocarditis, and uncontrolled thyroid disease all are possibilities. NYHA class I, euvolemic.  2. Benign essential hypertension. Controlled.  3. Hypothyroidism.  4. BMI 29.89.    Plan:  1. As she is planning on becoming pregnant we will switch carvedilol to metoprolol succinate 50 mg daily and losartan to the combination of hydralazine 25 mg three times a day and isosorbide dinitrate 10 mg three times a day.  2. Furosemide as needed.  3. Follow-up in 2-3 months.         History of Present Illness   Ms. Be Alexis is a 41 year old female with a significant past history of HFrEF with LVEF improved to 45% due to NICM, HTN, and hypothyroidism presenting for routine follow-up. She normally follows in cardiology clinic with my colleague Dr. Rodriguez.     Overall she reports doing well. She has gained a bit of weight which she attributes to physical activity. She felt dehydrated on scheduled furosemide so now only takes this as needed for an edema. She and her fiancee are trying to have a baby.     She denies any chest pain/pressure/tightness, shortness of breath at rest or with exertion, light headedness/dizziness, pre-syncope, syncope, lower extremity swelling, palpitations, paroxysmal nocturnal dyspnea (PND), or orthopnea.     Cardiac Problems and Cardiac Diagnostics     Most Recent Cardiac testing:  ECG dated 12/17/2020 (personaly reviewed and interpreted): SR, nonspecific T wave abnormality, QTc 489 ms    ECHO 4/8/2021 (report reviewed):     Left ventricle ejection  fraction is mildly decreased. The calculated left ventricular ejection fraction is 45%.    Normal right ventricular size. TAPSE is abnormal, which is consistent with abnormal right ventricular systolic function.    No significant valvular abnormalities.    When compared to the previous study dated 12/15/2020, LVEF is higher.    Stress test 12/17/2020 (report reviewed):   1. Lexiscan stress cardiac MRI is negative for inducible myocardial ischemia.  2. Left ventricular cavity size is mildly dilated. There is moderate concentric hypertrophy. Systolic function is severely  reduced with global hypokinesis. The calculated left ventricular ejection fraction is 21%.  3. Right ventricular cavity size is normal with with low normal right ventricular systolic function. The calculated right  ventricular ejection fraction is 45%.  4. No evidence of prior infarction or other focal myocardial scarring or fibrosis.  5. No significant valvular abnormalities.  6. Small circumferential pericardial effusion.     Medications  Allergies   Current Outpatient Medications   Medication Sig Dispense Refill     carvedilol (COREG) 25 MG tablet Take 1 tablet (25 mg) by mouth 2 times daily 180 tablet 1     furosemide (LASIX) 20 MG tablet [FUROSEMIDE (LASIX) 20 MG TABLET] Take 1 tablet (20 mg total) by mouth daily. 90 tablet 3     levothyroxine (SYNTHROID, LEVOTHROID) 50 MCG tablet [LEVOTHYROXINE (SYNTHROID, LEVOTHROID) 50 MCG TABLET] TAKE 1 TABLET BY MOUTH DAILY AT 6 AM 30 tablet 0     losartan (COZAAR) 25 MG tablet [LOSARTAN (COZAAR) 25 MG TABLET] Take 1 tablet (25 mg total) by mouth daily. 90 tablet 3     multivitamin with minerals (THERA-M) 9 mg iron-400 mcg Tab tablet [MULTIVITAMIN WITH MINERALS (THERA-M) 9 MG IRON-400 MCG TAB TABLET] Take 1 tablet by mouth daily.  0      Allergies   Allergen Reactions     Lisinopril Cough        Physical Examination Review of Systems   /70 (BP Location: Right arm, Patient Position: Sitting, Cuff Size:  "Adult Regular)   Pulse 85   Resp 16   Ht 1.499 m (4' 11\")   Wt 67.1 kg (148 lb)   BMI 29.89 kg/m    Body mass index is 29.89 kg/m .  Wt Readings from Last 3 Encounters:   12/16/21 67.1 kg (148 lb)   04/14/21 65.8 kg (145 lb)   03/30/21 65.8 kg (145 lb)       General Appearance:   Pleasant  female, appears  stated age. no acute distress, normal body habitus   ENT/Mouth: Facemask.   EYES:  no scleral icterus, normal conjunctivae   Neck: no carotid bruits. No anterior cervical lymphadenopaty   Respiratory:   lungs are clear to auscultation, no rales or wheezing, equal chest wall expansion    Cardiovascular:   Regular rhythm, normal rate. Normal first and second heart sounds with no murmurs, rubs, or gallops; the carotid, radial and posterior tibial pulses are intact, Jugular venous pressure normal, no edema bilaterally    Abdomen/GI:  no organomegaly, masses, bruits, or tenderness; bowel sounds are present   Extremities: no cyanosis or clubbing   Skin: no xanthelasma, warm.    Heme/lymph/ Immunology No apparent bleeding noted.   Neurologic: Alert and oriented. normal gait, no tremors     Psychiatric: Pleasant, calm, appropriate affect.    A complete 10 system review of systems was performed and is negative except as mentioned in the HPI/subjective.         Past History   Past Medical History:   Past Medical History:   Diagnosis Date     Anemia     severe, Hbg 7.2 in 1/2016     Endometrial disorder 2016    pelvic u/s showed heterogenous stripe; getting it biopsied in 2/2016     Hypothyroid     as of 1/2016 bumped up to 50mcg per day     Iron deficiency anemia 4/6/2017     Leiomyoma of uterus 04/13/2017     Overweight      Psoriasis        Past Surgical History:   Past Surgical History:   Procedure Laterality Date     HYSTEROSCOPY, ABLATE ENDOMETRIUM HYDROTHERMAL, COMBINED N/A 4/13/2017    Procedure: HYSTEROSCOPY DILATION AND CURETTAGE, MYOMECTOMY;  Surgeon: Alyse Barnett MD;  Location: [facility] OR;  " Service: Gynecology       Family History:   Family History   Problem Relation Age of Onset     No Known Problems Mother      No Known Problems Father        Social History:   Social History     Socioeconomic History     Marital status: Single     Spouse name: Not on file     Number of children: Not on file     Years of education: Not on file     Highest education level: Not on file   Occupational History     Not on file   Tobacco Use     Smoking status: Never Smoker     Smokeless tobacco: Never Used   Substance and Sexual Activity     Alcohol use: No     Drug use: No     Sexual activity: Yes     Partners: Male     Birth control/protection: OCP   Other Topics Concern     Not on file   Social History Narrative     Not on file     Social Determinants of Health     Financial Resource Strain: Not on file   Food Insecurity: Not on file   Transportation Needs: Not on file   Physical Activity: Not on file   Stress: Not on file   Social Connections: Not on file   Intimate Partner Violence: Not on file   Housing Stability: Not on file              Lab Results    Chemistry/lipid CBC Cardiac Enzymes/BNP/TSH/INR   Lab Results   Component Value Date    LDL 85 03/28/2013    CR 1.08 03/30/2021    BUN 17 03/30/2021    POTASSIUM 4.2 03/30/2021     03/30/2021    CO2 30 03/30/2021      Lab Results   Component Value Date    WBC 9.1 12/15/2020    HGB 12.9 12/15/2020    HCT 40.7 12/15/2020    MCV 83 12/15/2020     12/15/2020      Lab Results   Component Value Date    TROPONINI 0.01 12/16/2020    BNP 30 01/07/2021    TSH 0.65 01/07/2021    INR 0.96 12/15/2020          Hai Ndiaye MD [facility]  Non-Invasive Cardiologist  [facility]  Pager [phone]    Thank you for allowing me to participate in the care of your patient.      Sincerely,     Hai Ndiaye MD     [facility]  cc:   Maria Luisa Rodriguez MD  [address]  [address]  [place]  [address]

## 2021-12-16 NOTE — PROGRESS NOTES
HEART CARE ENCOUNTER NOTE          Assessment/Recommendations   Assessment:    1. Chronic congestive heart failure with reduced left ventricular ejection fraction since improved to 45% due to nonischemic cardiomyopathy. Unclear cause, but hypertensive heart disease, viral myocarditis, and uncontrolled thyroid disease all are possibilities. NYHA class I, euvolemic.  2. Benign essential hypertension. Controlled.  3. Hypothyroidism.  4. BMI 29.89.    Plan:  1. As she is planning on becoming pregnant we will switch carvedilol to metoprolol succinate 50 mg daily and losartan to the combination of hydralazine 25 mg three times a day and isosorbide dinitrate 10 mg three times a day.  2. Furosemide as needed.  3. Follow-up in 2-3 months.         History of Present Illness   Ms. Be Alexis is a 41 year old female with a significant past history of HFrEF with LVEF improved to 45% due to NICM, HTN, and hypothyroidism presenting for routine follow-up. She normally follows in cardiology clinic with my colleague Dr. Rodriguez.     Overall she reports doing well. She has gained a bit of weight which she attributes to physical activity. She felt dehydrated on scheduled furosemide so now only takes this as needed for an edema. She and her fiancee are trying to have a baby.     She denies any chest pain/pressure/tightness, shortness of breath at rest or with exertion, light headedness/dizziness, pre-syncope, syncope, lower extremity swelling, palpitations, paroxysmal nocturnal dyspnea (PND), or orthopnea.     Cardiac Problems and Cardiac Diagnostics     Most Recent Cardiac testing:  ECG dated 12/17/2020 (personaly reviewed and interpreted): SR, nonspecific T wave abnormality, QTc 489 ms    ECHO 4/8/2021 (report reviewed):     Left ventricle ejection fraction is mildly decreased. The calculated left ventricular ejection fraction is 45%.    Normal right ventricular size. TAPSE is abnormal, which is consistent with abnormal right  "ventricular systolic function.    No significant valvular abnormalities.    When compared to the previous study dated 12/15/2020, LVEF is higher.    Stress test 12/17/2020 (report reviewed):   1. Lexiscan stress cardiac MRI is negative for inducible myocardial ischemia.  2. Left ventricular cavity size is mildly dilated. There is moderate concentric hypertrophy. Systolic function is severely  reduced with global hypokinesis. The calculated left ventricular ejection fraction is 21%.  3. Right ventricular cavity size is normal with with low normal right ventricular systolic function. The calculated right  ventricular ejection fraction is 45%.  4. No evidence of prior infarction or other focal myocardial scarring or fibrosis.  5. No significant valvular abnormalities.  6. Small circumferential pericardial effusion.     Medications  Allergies   Current Outpatient Medications   Medication Sig Dispense Refill     carvedilol (COREG) 25 MG tablet Take 1 tablet (25 mg) by mouth 2 times daily 180 tablet 1     furosemide (LASIX) 20 MG tablet [FUROSEMIDE (LASIX) 20 MG TABLET] Take 1 tablet (20 mg total) by mouth daily. 90 tablet 3     levothyroxine (SYNTHROID, LEVOTHROID) 50 MCG tablet [LEVOTHYROXINE (SYNTHROID, LEVOTHROID) 50 MCG TABLET] TAKE 1 TABLET BY MOUTH DAILY AT 6 AM 30 tablet 0     losartan (COZAAR) 25 MG tablet [LOSARTAN (COZAAR) 25 MG TABLET] Take 1 tablet (25 mg total) by mouth daily. 90 tablet 3     multivitamin with minerals (THERA-M) 9 mg iron-400 mcg Tab tablet [MULTIVITAMIN WITH MINERALS (THERA-M) 9 MG IRON-400 MCG TAB TABLET] Take 1 tablet by mouth daily.  0      Allergies   Allergen Reactions     Lisinopril Cough        Physical Examination Review of Systems   /70 (BP Location: Right arm, Patient Position: Sitting, Cuff Size: Adult Regular)   Pulse 85   Resp 16   Ht 1.499 m (4' 11\")   Wt 67.1 kg (148 lb)   BMI 29.89 kg/m    Body mass index is 29.89 kg/m .  Wt Readings from Last 3 Encounters: "   12/16/21 67.1 kg (148 lb)   04/14/21 65.8 kg (145 lb)   03/30/21 65.8 kg (145 lb)       General Appearance:   Pleasant  female, appears  stated age. no acute distress, normal body habitus   ENT/Mouth: Facemask.   EYES:  no scleral icterus, normal conjunctivae   Neck: no carotid bruits. No anterior cervical lymphadenopaty   Respiratory:   lungs are clear to auscultation, no rales or wheezing, equal chest wall expansion    Cardiovascular:   Regular rhythm, normal rate. Normal first and second heart sounds with no murmurs, rubs, or gallops; the carotid, radial and posterior tibial pulses are intact, Jugular venous pressure normal, no edema bilaterally    Abdomen/GI:  no organomegaly, masses, bruits, or tenderness; bowel sounds are present   Extremities: no cyanosis or clubbing   Skin: no xanthelasma, warm.    Heme/lymph/ Immunology No apparent bleeding noted.   Neurologic: Alert and oriented. normal gait, no tremors     Psychiatric: Pleasant, calm, appropriate affect.    A complete 10 system review of systems was performed and is negative except as mentioned in the HPI/subjective.         Past History   Past Medical History:   Past Medical History:   Diagnosis Date     Anemia     severe, Hbg 7.2 in 1/2016     Endometrial disorder 2016    pelvic u/s showed heterogenous stripe; getting it biopsied in 2/2016     Hypothyroid     as of 1/2016 bumped up to 50mcg per day     Iron deficiency anemia 4/6/2017     Leiomyoma of uterus 04/13/2017     Overweight      Psoriasis        Past Surgical History:   Past Surgical History:   Procedure Laterality Date     HYSTEROSCOPY, ABLATE ENDOMETRIUM HYDROTHERMAL, COMBINED N/A 4/13/2017    Procedure: HYSTEROSCOPY DILATION AND CURETTAGE, MYOMECTOMY;  Surgeon: Alyse Barnett MD;  Location: [facility];  Service: Gynecology       Family History:   Family History   Problem Relation Age of Onset     No Known Problems Mother      No Known Problems Father        Social History:    Social History     Socioeconomic History     Marital status: Single     Spouse name: Not on file     Number of children: Not on file     Years of education: Not on file     Highest education level: Not on file   Occupational History     Not on file   Tobacco Use     Smoking status: Never Smoker     Smokeless tobacco: Never Used   Substance and Sexual Activity     Alcohol use: No     Drug use: No     Sexual activity: Yes     Partners: Male     Birth control/protection: OCP   Other Topics Concern     Not on file   Social History Narrative     Not on file     Social Determinants of Health     Financial Resource Strain: Not on file   Food Insecurity: Not on file   Transportation Needs: Not on file   Physical Activity: Not on file   Stress: Not on file   Social Connections: Not on file   Intimate Partner Violence: Not on file   Housing Stability: Not on file              Lab Results    Chemistry/lipid CBC Cardiac Enzymes/BNP/TSH/INR   Lab Results   Component Value Date    LDL 85 03/28/2013    CR 1.08 03/30/2021    BUN 17 03/30/2021    POTASSIUM 4.2 03/30/2021     03/30/2021    CO2 30 03/30/2021      Lab Results   Component Value Date    WBC 9.1 12/15/2020    HGB 12.9 12/15/2020    HCT 40.7 12/15/2020    MCV 83 12/15/2020     12/15/2020      Lab Results   Component Value Date    TROPONINI 0.01 12/16/2020    BNP 30 01/07/2021    TSH 0.65 01/07/2021    INR 0.96 12/15/2020          Hai Ndiaye MD [facility]  Non-Invasive Cardiologist  [facility]  Pager [phone]

## 2021-12-23 ENCOUNTER — TELEPHONE (OUTPATIENT)
Dept: CARDIOLOGY | Facility: CLINIC | Age: 41
End: 2021-12-23
Payer: COMMERCIAL

## 2021-12-23 DIAGNOSIS — I50.22 CHRONIC SYSTOLIC HEART FAILURE (H): ICD-10-CM

## 2021-12-23 NOTE — TELEPHONE ENCOUNTER
Dr. Ndiaye,  Upon your return please see patient update below.  Follow up is planned in February or March.  Do you have any new recommendations in the interim?  Thank you - Ana Rosa  ------------------------------------------------  Called patient to discuss her concerns.  Since switching medications to hydralazine, isosorbide and metoprolol she experienced symptoms of dizziness, headaches with neck aches and pains.  She feels it is the metoprolol giving her the symptoms. She took half metoprolol (25 mg) this morning and is feeling better now and is not as dizzy. She continues taking hydralazine 25 mg and isosorbide 10 mg 3 times daily as prescribed.   She is monitoring heart rate and blood pressure daily and states they have been controlled. Her heart rate is 89 at this time and she is feeling better.  Assured patient an update will be sent to Dr. Ndiaye and she will be contacted if new recommendations are offered. Understanding verbalized.

## 2021-12-23 NOTE — TELEPHONE ENCOUNTER
----- Message from Parris Cobian sent at 12/23/2021  9:28 AM CST -----  Regarding: HALIE  General phone call:    Caller: Be  Primary cardiologist: HALIE  Detailed reason for call: Pt is calling and states she is having severe side effects from her most recent medication change. Pt states she has headaches to where she cannot sleep at night and the pain radiates down her back. Pt states she is also nauseated    Best phone number: [phone]  Best time to contact: anytime  Ok to leave a detailedmessage? yes  Device? no    Additional Info:

## 2021-12-24 RX ORDER — METOPROLOL SUCCINATE 25 MG/1
25 TABLET, EXTENDED RELEASE ORAL DAILY
Qty: 90 TABLET | Refills: 3
Start: 2021-12-24 | End: 2022-03-18

## 2021-12-24 NOTE — TELEPHONE ENCOUNTER
Noted. No call required at this time.  ===========================  Hai Ndiaye MD  You 2 minutes ago (11:43 AM)     BW    We can continue on the lower dose of metoprolol succinate 25 mg daily if she is tolerating this better.

## 2022-01-20 ENCOUNTER — VIRTUAL VISIT (OUTPATIENT)
Dept: FAMILY MEDICINE | Facility: CLINIC | Age: 42
End: 2022-01-20
Payer: COMMERCIAL

## 2022-01-20 DIAGNOSIS — I50.22 CHRONIC SYSTOLIC HEART FAILURE (H): ICD-10-CM

## 2022-01-20 DIAGNOSIS — Z31.89 ENCOUNTER FOR FERTILITY PLANNING: ICD-10-CM

## 2022-01-20 DIAGNOSIS — L40.8 OTHER PSORIASIS: ICD-10-CM

## 2022-01-20 DIAGNOSIS — E06.3 HYPOTHYROIDISM DUE TO HASHIMOTO'S THYROIDITIS: Primary | ICD-10-CM

## 2022-01-20 PROCEDURE — 99213 OFFICE O/P EST LOW 20 MIN: CPT | Mod: GT | Performed by: PHYSICIAN ASSISTANT

## 2022-01-20 RX ORDER — LEVOTHYROXINE SODIUM 50 UG/1
50 TABLET ORAL DAILY
Qty: 30 TABLET | Refills: 0 | Status: SHIPPED | OUTPATIENT
Start: 2022-01-20 | End: 2022-03-14

## 2022-01-20 RX ORDER — TRIAMCINOLONE ACETONIDE 5 MG/G
OINTMENT TOPICAL
Qty: 30 G | Refills: 1 | Status: SHIPPED | OUTPATIENT
Start: 2022-01-20 | End: 2023-09-15

## 2022-01-20 NOTE — PROGRESS NOTES
Be is a 41 year old who is being evaluated via a billable video visit.      How would you like to obtain your AVS? MyChart  If the video visit is dropped, the invitation should be resent by: Text to cell phone: [phone]  Will anyone else be joining your video visit? No      Video Start Time:     NO VIDEO, used [employer], patient could see me but I couldn't see her, sound OK, continued visit this way, call was dropped toward end and then I called her on phone    Assessment & Plan     1. Hypothyroidism due to Hashimoto's thyroiditis  Taking thyroid medicine, due for screening labs, will come in for lab-only.  - levothyroxine (SYNTHROID/LEVOTHROID) 50 MCG tablet; Take 1 tablet (50 mcg) by mouth daily  Dispense: 30 tablet; Refill: 0  - TSH with free T4 reflex; Future    2. Psoriasis  Generally well-controlled, needs refill of cream to use prn.  - triamcinolone (KENALOG) 0.5 % external ointment; Apply to affected areas daily, as needed.  Dispense: 30 g; Refill: 1    3. Encounter for fertility planning    Discussed meeting with [facility] sooner rather than later, see HPI.  She wants to see Dr. Barnett, has seen her before.  - Ob/Gyn Referral; Future    4. Chronic systolic heart failure (H)  Following with Cardiology.  Due for labs, will come in for lab-only.  - BNP-N terminal pro; Future  - Basic metabolic panel; Future  - CBC with platelets; Future      Subjective   Be is a 41 year old who presents for the following health issues     HPI     Needs refill on thyroid  Met with Cards recently      Trying for pregnancy  Never regular periods  Last period 3 months ago  Have ovulation kits, just started 1 week ago  Dr. Anibal GRAHAM seen before  Psoriasis, use tri, ointment 0.5%  No smoking, no drinking  Has prenatal vit  Will get COVID booster soon      Objective           Vitals:  No vitals were obtained today due to virtual visit.    Physical Exam   GENERAL: Alert and no distress  RESP: No audible wheeze, or  cough   NEURO: Mentation and speech appropriate for age.  PSYCH: Mentation appears normal, judgement and insight intact, normal speech.          Start time: 1707  End Time: 1728

## 2022-01-21 DIAGNOSIS — E06.3 HYPOTHYROIDISM DUE TO HASHIMOTO'S THYROIDITIS: ICD-10-CM

## 2022-01-24 RX ORDER — LEVOTHYROXINE SODIUM 50 UG/1
50 TABLET ORAL DAILY
Qty: 30 TABLET | Refills: 0 | OUTPATIENT
Start: 2022-01-24

## 2022-01-31 ENCOUNTER — LAB (OUTPATIENT)
Dept: LAB | Facility: CLINIC | Age: 42
End: 2022-01-31
Payer: COMMERCIAL

## 2022-01-31 DIAGNOSIS — I50.22 CHRONIC SYSTOLIC HEART FAILURE (H): ICD-10-CM

## 2022-01-31 DIAGNOSIS — E06.3 HYPOTHYROIDISM DUE TO HASHIMOTO'S THYROIDITIS: ICD-10-CM

## 2022-01-31 LAB
ANION GAP SERPL CALCULATED.3IONS-SCNC: 9 MMOL/L (ref 5–18)
BUN SERPL-MCNC: 12 MG/DL (ref 8–22)
CALCIUM SERPL-MCNC: 9.4 MG/DL (ref 8.5–10.5)
CHLORIDE BLD-SCNC: 106 MMOL/L (ref 98–107)
CO2 SERPL-SCNC: 22 MMOL/L (ref 22–31)
CREAT SERPL-MCNC: 1.01 MG/DL (ref 0.6–1.1)
ERYTHROCYTE [DISTWIDTH] IN BLOOD BY AUTOMATED COUNT: 12.8 % (ref 10–15)
GFR SERPL CREATININE-BSD FRML MDRD: 72 ML/MIN/1.73M2
GLUCOSE BLD-MCNC: 186 MG/DL (ref 70–125)
HCT VFR BLD AUTO: 38.5 % (ref 35–47)
HGB BLD-MCNC: 12.5 G/DL (ref 11.7–15.7)
MCH RBC QN AUTO: 29.6 PG (ref 26.5–33)
MCHC RBC AUTO-ENTMCNC: 32.5 G/DL (ref 31.5–36.5)
MCV RBC AUTO: 91 FL (ref 78–100)
NT-PROBNP SERPL-MCNC: 73 PG/ML (ref 0–125)
PLATELET # BLD AUTO: 254 10E3/UL (ref 150–450)
POTASSIUM BLD-SCNC: 3.8 MMOL/L (ref 3.5–5)
RBC # BLD AUTO: 4.23 10E6/UL (ref 3.8–5.2)
SODIUM SERPL-SCNC: 137 MMOL/L (ref 136–145)
T4 FREE SERPL-MCNC: 0.97 NG/DL (ref 0.7–1.8)
TSH SERPL DL<=0.005 MIU/L-ACNC: 8.34 UIU/ML (ref 0.3–5)
WBC # BLD AUTO: 8.3 10E3/UL (ref 4–11)

## 2022-01-31 PROCEDURE — 84443 ASSAY THYROID STIM HORMONE: CPT

## 2022-01-31 PROCEDURE — 84439 ASSAY OF FREE THYROXINE: CPT

## 2022-01-31 PROCEDURE — 36415 COLL VENOUS BLD VENIPUNCTURE: CPT

## 2022-01-31 PROCEDURE — 85027 COMPLETE CBC AUTOMATED: CPT

## 2022-01-31 PROCEDURE — 83880 ASSAY OF NATRIURETIC PEPTIDE: CPT

## 2022-01-31 PROCEDURE — 80048 BASIC METABOLIC PNL TOTAL CA: CPT

## 2022-02-28 ENCOUNTER — TRANSFERRED RECORDS (OUTPATIENT)
Dept: HEALTH INFORMATION MANAGEMENT | Facility: CLINIC | Age: 42
End: 2022-02-28
Payer: COMMERCIAL

## 2022-03-14 DIAGNOSIS — E06.3 HYPOTHYROIDISM DUE TO HASHIMOTO'S THYROIDITIS: ICD-10-CM

## 2022-03-14 RX ORDER — LEVOTHYROXINE SODIUM 50 UG/1
50 TABLET ORAL DAILY
Qty: 90 TABLET | Refills: 0 | Status: SHIPPED | OUTPATIENT
Start: 2022-03-14 | End: 2022-06-15

## 2022-03-18 DIAGNOSIS — I50.22 CHRONIC SYSTOLIC HEART FAILURE (H): ICD-10-CM

## 2022-03-18 RX ORDER — METOPROLOL SUCCINATE 25 MG/1
25 TABLET, EXTENDED RELEASE ORAL DAILY
Qty: 90 TABLET | Refills: 3 | Status: SHIPPED | OUTPATIENT
Start: 2022-03-18 | End: 2022-06-15

## 2022-06-13 DIAGNOSIS — E06.3 HYPOTHYROIDISM DUE TO HASHIMOTO'S THYROIDITIS: ICD-10-CM

## 2022-06-14 NOTE — TELEPHONE ENCOUNTER
"Routing refill request to provider for review/approval because:  Labs out of range:  TSH    Last Written Prescription Date:  3/14/22  Last Fill Quantity: 90,  # refills: 0   Last office visit provider:  1/20/22     Requested Prescriptions   Pending Prescriptions Disp Refills     levothyroxine (SYNTHROID/LEVOTHROID) 50 MCG tablet 90 tablet 0     Sig: Take 1 tablet (50 mcg) by mouth daily       Thyroid Protocol Failed - 6/14/2022  3:23 PM        Failed - Normal TSH on file in past 12 months     Recent Labs   Lab Test 01/31/22  0746   TSH 8.34*              Passed - Patient is 12 years or older        Passed - Recent (12 mo) or future (30 days) visit within the authorizing provider's specialty     Patient has had an office visit with the authorizing provider or a provider within the authorizing providers department within the previous 12 mos or has a future within next 30 days. See \"Patient Info\" tab in inbasket, or \"Choose Columns\" in Meds & Orders section of the refill encounter.              Passed - Medication is active on med list        Passed - No active pregnancy on record     If patient is pregnant or has had a positive pregnancy test, please check TSH.          Passed - No positive pregnancy test in past 12 months     If patient is pregnant or has had a positive pregnancy test, please check TSH.               Carlos Arellano RN 06/14/22 3:23 PM  "

## 2022-06-15 ENCOUNTER — OFFICE VISIT (OUTPATIENT)
Dept: CARDIOLOGY | Facility: CLINIC | Age: 42
End: 2022-06-15
Payer: COMMERCIAL

## 2022-06-15 VITALS
WEIGHT: 149 LBS | HEART RATE: 76 BPM | SYSTOLIC BLOOD PRESSURE: 138 MMHG | BODY MASS INDEX: 30.09 KG/M2 | RESPIRATION RATE: 14 BRPM | DIASTOLIC BLOOD PRESSURE: 98 MMHG

## 2022-06-15 DIAGNOSIS — I50.22 CHRONIC SYSTOLIC HEART FAILURE (H): ICD-10-CM

## 2022-06-15 DIAGNOSIS — I10 ESSENTIAL HYPERTENSION: Primary | ICD-10-CM

## 2022-06-15 DIAGNOSIS — I42.8 NONISCHEMIC CARDIOMYOPATHY (H): ICD-10-CM

## 2022-06-15 PROCEDURE — 99214 OFFICE O/P EST MOD 30 MIN: CPT | Performed by: GENERAL ACUTE CARE HOSPITAL

## 2022-06-15 RX ORDER — METOPROLOL SUCCINATE 25 MG/1
25 TABLET, EXTENDED RELEASE ORAL DAILY
Qty: 90 TABLET | Refills: 3 | Status: SHIPPED | OUTPATIENT
Start: 2022-06-15 | End: 2023-08-11

## 2022-06-15 RX ORDER — HYDRALAZINE HYDROCHLORIDE 25 MG/1
25 TABLET, FILM COATED ORAL 2 TIMES DAILY
Qty: 80 TABLET | Refills: 3 | Status: SHIPPED | OUTPATIENT
Start: 2022-06-15 | End: 2022-06-15

## 2022-06-15 RX ORDER — FUROSEMIDE 20 MG
20 TABLET ORAL DAILY PRN
COMMUNITY
Start: 2022-01-29 | End: 2024-01-04

## 2022-06-15 RX ORDER — ISOSORBIDE DINITRATE 10 MG/1
10 TABLET ORAL
Qty: 180 TABLET | Refills: 3 | Status: SHIPPED | OUTPATIENT
Start: 2022-06-15 | End: 2023-07-27

## 2022-06-15 RX ORDER — LEVOTHYROXINE SODIUM 50 UG/1
50 TABLET ORAL DAILY
Qty: 90 TABLET | Refills: 0 | Status: SHIPPED | OUTPATIENT
Start: 2022-06-15 | End: 2022-09-19

## 2022-06-15 NOTE — PROGRESS NOTES
HEART CARE ENCOUNTER NOTE        Assessment/Recommendations   Assessment:    1. Chronic congestive heart failure with reduced left ventricular ejection fraction since improved to 45% due to nonischemic cardiomyopathy. Unclear cause, but hypertensive heart disease, viral myocarditis, and uncontrolled thyroid disease all are possibilities. NYHA class I, euvolemic.  2. Benign essential hypertension. A bit high today but she reports   3. Hypothyroidism.  4. BMI 30.09.    Plan:  1. Continue reduced doses of metoprolol succinate 25 mg daily, hydralazine 25 mg and isosorbide dinitrate 10 mg both twice daily.  2. Furosemide 20 mg as needed.  3. Follow-up as needed.          History of Present Illness   Ms. Be Alexis is a 42 year old female with a significant past history of HFrEF with LVEF improved to 45% due to NICM, HTN, and hypothyroidism presenting for routine follow-up.    At her last visit, she indicated she planned to get pregnant so her heart failure medications were changed to metoprolol succinate 50 mg daily, hydralazine 25 mg and isosorbide dinitrate 10 mg both three times daily. She felt dizzy and reported low blood pressure at home. She reduced the doses of these and feels better now. She and her  are planning to try IVF. Blood pressure at home is in the 120s systolic.    She denies any chest pain/pressure/tightness, shortness of breath at rest or with exertion, light headedness/dizziness, pre-syncope, syncope, lower extremity swelling, palpitations, paroxysmal nocturnal dyspnea (PND), or orthopnea.     Cardiac Problems and Cardiac Diagnostics     Most Recent Cardiac testing:  ECG dated 12/17/2020 (personaly reviewed and interpreted): SR, nonspecific T wave abnormality, QTc 489 ms     ECHO 4/8/2021 (report reviewed):     Left ventricle ejection fraction is mildly decreased. The calculated left ventricular ejection fraction is 45%.    Normal right ventricular size. TAPSE is abnormal, which is consistent  with abnormal right ventricular systolic function.    No significant valvular abnormalities.    When compared to the previous study dated 12/15/2020, LVEF is higher.     Stress test 12/17/2020 (report reviewed):   1. Lexiscan stress cardiac MRI is negative for inducible myocardial ischemia.  2. Left ventricular cavity size is mildly dilated. There is moderate concentric hypertrophy. Systolic function is severely  reduced with global hypokinesis. The calculated left ventricular ejection fraction is 21%.  3. Right ventricular cavity size is normal with with low normal right ventricular systolic function. The calculated right  ventricular ejection fraction is 45%.  4. No evidence of prior infarction or other focal myocardial scarring or fibrosis.  5. No significant valvular abnormalities.  6. Small circumferential pericardial effusion.     Medications  Allergies   Current Outpatient Medications   Medication Sig Dispense Refill     furosemide (LASIX) 20 MG tablet Take 20 mg by mouth daily as needed       hydrALAZINE (APRESOLINE) 25 MG tablet Take 1 tablet (25 mg) by mouth 3 times daily 270 tablet 3     isosorbide dinitrate (ISORDIL) 10 MG tablet Take 1 tablet (10 mg) by mouth 3 times daily 270 tablet 3     levothyroxine (SYNTHROID/LEVOTHROID) 50 MCG tablet Take 1 tablet (50 mcg) by mouth daily 90 tablet 0     metoprolol succinate ER (TOPROL-XL) 25 MG 24 hr tablet Take 1 tablet (25 mg) by mouth daily 90 tablet 3     multivitamin with minerals (THERA-M) 9 mg iron-400 mcg Tab tablet [MULTIVITAMIN WITH MINERALS (THERA-M) 9 MG IRON-400 MCG TAB TABLET] Take 1 tablet by mouth daily.  0     triamcinolone (KENALOG) 0.5 % external ointment Apply to affected areas daily, as needed. 30 g 1      Allergies   Allergen Reactions     Lisinopril Cough        Physical Examination Review of Systems   BP (!) 138/98 (BP Location: Left arm, Patient Position: Sitting, Cuff Size: Adult Regular)   Pulse 76   Resp 14   Wt 67.6 kg (149 lb)    BMI 30.09 kg/m    Body mass index is 30.09 kg/m .  Wt Readings from Last 3 Encounters:   12/16/21 67.1 kg (148 lb)   04/14/21 65.8 kg (145 lb)   03/30/21 65.8 kg (145 lb)       General Appearance:   Pleasant  female, appears  stated age. no acute distress, obese body habitus   ENT/Mouth: Facemask.     EYES:  no scleral icterus, normal conjunctivae   Neck: no carotid bruits. No anterior cervical lymphadenopaty   Respiratory:   lungs are clear to auscultation, no rales or wheezing, equal chest wall expansion    Cardiovascular:   Regular rhythm, normal rate. Normal first and second heart sounds with no murmurs, rubs, or gallops; the carotid, radial and posterior tibial pulses are intact, Jugular venous pressure normal, no edema bilaterally    Abdomen/GI:  no organomegaly, masses, bruits, or tenderness; bowel sounds are present   Extremities: no cyanosis or clubbing   Skin: no xanthelasma, warm.    Heme/lymph/ Immunology No apparent bleeding noted.   Neurologic: Alert and oriented. normal gait, no tremors     Psychiatric: Pleasant, calm, appropriate affect.    A complete 10 system review of systems was performed and is negative except as mentioned in the HPI/subjective.         Past History   Past Medical History:   Past Medical History:   Diagnosis Date     Anemia     severe, Hbg 7.2 in 1/2016     Endometrial disorder 2016    pelvic u/s showed heterogenous stripe; getting it biopsied in 2/2016     Hypothyroid     as of 1/2016 bumped up to 50mcg per day     Iron deficiency anemia 4/6/2017     Leiomyoma of uterus 04/13/2017     Overweight      Psoriasis        Past Surgical History:   Past Surgical History:   Procedure Laterality Date     HYSTEROSCOPY, ABLATE ENDOMETRIUM HYDROTHERMAL, COMBINED N/A 4/13/2017    Procedure: HYSTEROSCOPY DILATION AND CURETTAGE, MYOMECTOMY;  Surgeon: Alyse Barnett MD;  Location: [facility] [place];  Service: Gynecology       Family History:   Family History   Problem Relation Age of  Onset     No Known Problems Mother      No Known Problems Father        Social History:   Social History     Socioeconomic History     Marital status: Single     Spouse name: Not on file     Number of children: Not on file     Years of education: Not on file     Highest education level: Not on file   Occupational History     Not on file   Tobacco Use     Smoking status: Never Smoker     Smokeless tobacco: Never Used   Substance and Sexual Activity     Alcohol use: No     Drug use: No     Sexual activity: Yes     Partners: Male     Birth control/protection: OCP   Other Topics Concern     Not on file   Social History Narrative     Not on file     Social Determinants of Health     Financial Resource Strain: Not on file   Food Insecurity: Not on file   Transportation Needs: Not on file   Physical Activity: Not on file   Stress: Not on file   Social Connections: Not on file   Intimate Partner Violence: Not on file   Housing Stability: Not on file              Lab Results    Chemistry/lipid CBC Cardiac Enzymes/BNP/TSH/INR   Lab Results   Component Value Date    LDL 85 03/28/2013    CR 1.01 01/31/2022    BUN 12 01/31/2022    POTASSIUM 3.8 01/31/2022     01/31/2022    CO2 22 01/31/2022      Lab Results   Component Value Date    WBC 8.3 01/31/2022    HGB 12.5 01/31/2022    HCT 38.5 01/31/2022    MCV 91 01/31/2022     01/31/2022      Lab Results   Component Value Date    TROPONINI 0.01 12/16/2020    BNP 30 01/07/2021    NTBNP 73 01/31/2022    TSH 8.34 (H) 01/31/2022    INR 0.96 12/15/2020          Hai Ndiaye MD [facility]  Non-Invasive Cardiologist  [facility]  Pager [phone]

## 2022-06-15 NOTE — LETTER
6/15/2022    Josue Correa MD  [address] [place] [address]    RE: Be Alexis       Dear Colleague,     I had the pleasure of seeing Be Alexis in the [facility].  HEART CARE ENCOUNTER NOTE        Assessment/Recommendations   Assessment:    1. Chronic congestive heart failure with reduced left ventricular ejection fraction since improved to 45% due to nonischemic cardiomyopathy. Unclear cause, but hypertensive heart disease, viral myocarditis, and uncontrolled thyroid disease all are possibilities. NYHA class I, euvolemic.  2. Benign essential hypertension. A bit high today but she reports   3. Hypothyroidism.  4. BMI 30.09.    Plan:  1. Continue reduced doses of metoprolol succinate 25 mg daily, hydralazine 25 mg and isosorbide dinitrate 10 mg both twice daily.  2. Furosemide 20 mg as needed.  3. Follow-up as needed.          History of Present Illness   Ms. Be Alexis is a 42 year old female with a significant past history of HFrEF with LVEF improved to 45% due to NICM, HTN, and hypothyroidism presenting for routine follow-up.    At her last visit, she indicated she planned to get pregnant so her heart failure medications were changed to metoprolol succinate 50 mg daily, hydralazine 25 mg and isosorbide dinitrate 10 mg both three times daily. She felt dizzy and reported low blood pressure at home. She reduced the doses of these and feels better now. She and her  are planning to try IVF. Blood pressure at home is in the 120s systolic.    She denies any chest pain/pressure/tightness, shortness of breath at rest or with exertion, light headedness/dizziness, pre-syncope, syncope, lower extremity swelling, palpitations, paroxysmal nocturnal dyspnea (PND), or orthopnea.     Cardiac Problems and Cardiac Diagnostics     Most Recent Cardiac testing:  ECG dated 12/17/2020 (personaly reviewed and interpreted): SR, nonspecific T wave abnormality, QTc 489 ms     ECHO 4/8/2021 (report reviewed):      Left ventricle ejection fraction is mildly decreased. The calculated left ventricular ejection fraction is 45%.    Normal right ventricular size. TAPSE is abnormal, which is consistent with abnormal right ventricular systolic function.    No significant valvular abnormalities.    When compared to the previous study dated 12/15/2020, LVEF is higher.     Stress test 12/17/2020 (report reviewed):   1. Lexiscan stress cardiac MRI is negative for inducible myocardial ischemia.  2. Left ventricular cavity size is mildly dilated. There is moderate concentric hypertrophy. Systolic function is severely  reduced with global hypokinesis. The calculated left ventricular ejection fraction is 21%.  3. Right ventricular cavity size is normal with with low normal right ventricular systolic function. The calculated right  ventricular ejection fraction is 45%.  4. No evidence of prior infarction or other focal myocardial scarring or fibrosis.  5. No significant valvular abnormalities.  6. Small circumferential pericardial effusion.     Medications  Allergies   Current Outpatient Medications   Medication Sig Dispense Refill     furosemide (LASIX) 20 MG tablet Take 20 mg by mouth daily as needed       hydrALAZINE (APRESOLINE) 25 MG tablet Take 1 tablet (25 mg) by mouth 3 times daily 270 tablet 3     isosorbide dinitrate (ISORDIL) 10 MG tablet Take 1 tablet (10 mg) by mouth 3 times daily 270 tablet 3     levothyroxine (SYNTHROID/LEVOTHROID) 50 MCG tablet Take 1 tablet (50 mcg) by mouth daily 90 tablet 0     metoprolol succinate ER (TOPROL-XL) 25 MG 24 hr tablet Take 1 tablet (25 mg) by mouth daily 90 tablet 3     multivitamin with minerals (THERA-M) 9 mg iron-400 mcg Tab tablet [MULTIVITAMIN WITH MINERALS (THERA-M) 9 MG IRON-400 MCG TAB TABLET] Take 1 tablet by mouth daily.  0     triamcinolone (KENALOG) 0.5 % external ointment Apply to affected areas daily, as needed. 30 g 1      Allergies   Allergen Reactions     Lisinopril Cough         Physical Examination Review of Systems   BP (!) 138/98 (BP Location: Left arm, Patient Position: Sitting, Cuff Size: Adult Regular)   Pulse 76   Resp 14   Wt 67.6 kg (149 lb)   BMI 30.09 kg/m    Body mass index is 30.09 kg/m .  Wt Readings from Last 3 Encounters:   12/16/21 67.1 kg (148 lb)   04/14/21 65.8 kg (145 lb)   03/30/21 65.8 kg (145 lb)       General Appearance:   Pleasant  female, appears  stated age. no acute distress, obese body habitus   ENT/Mouth: Facemask.     EYES:  no scleral icterus, normal conjunctivae   Neck: no carotid bruits. No anterior cervical lymphadenopaty   Respiratory:   lungs are clear to auscultation, no rales or wheezing, equal chest wall expansion    Cardiovascular:   Regular rhythm, normal rate. Normal first and second heart sounds with no murmurs, rubs, or gallops; the carotid, radial and posterior tibial pulses are intact, Jugular venous pressure normal, no edema bilaterally    Abdomen/GI:  no organomegaly, masses, bruits, or tenderness; bowel sounds are present   Extremities: no cyanosis or clubbing   Skin: no xanthelasma, warm.    Heme/lymph/ Immunology No apparent bleeding noted.   Neurologic: Alert and oriented. normal gait, no tremors     Psychiatric: Pleasant, calm, appropriate affect.    A complete 10 system review of systems was performed and is negative except as mentioned in the HPI/subjective.         Past History   Past Medical History:   Past Medical History:   Diagnosis Date     Anemia     severe, Hbg 7.2 in 1/2016     Endometrial disorder 2016    pelvic u/s showed heterogenous stripe; getting it biopsied in 2/2016     Hypothyroid     as of 1/2016 bumped up to 50mcg per day     Iron deficiency anemia 4/6/2017     Leiomyoma of uterus 04/13/2017     Overweight      Psoriasis        Past Surgical History:   Past Surgical History:   Procedure Laterality Date     HYSTEROSCOPY, ABLATE ENDOMETRIUM HYDROTHERMAL, COMBINED N/A 4/13/2017    Procedure: HYSTEROSCOPY  DILATION AND CURETTAGE, MYOMECTOMY;  Surgeon: Alyse Barnett MD;  Location: [facility] [place];  Service: Gynecology       Family History:   Family History   Problem Relation Age of Onset     No Known Problems Mother      No Known Problems Father        Social History:   Social History     Socioeconomic History     Marital status: Single     Spouse name: Not on file     Number of children: Not on file     Years of education: Not on file     Highest education level: Not on file   Occupational History     Not on file   Tobacco Use     Smoking status: Never Smoker     Smokeless tobacco: Never Used   Substance and Sexual Activity     Alcohol use: No     Drug use: No     Sexual activity: Yes     Partners: Male     Birth control/protection: OCP   Other Topics Concern     Not on file   Social History Narrative     Not on file     Social Determinants of Health     Financial Resource Strain: Not on file   Food Insecurity: Not on file   Transportation Needs: Not on file   Physical Activity: Not on file   Stress: Not on file   Social Connections: Not on file   Intimate Partner Violence: Not on file   Housing Stability: Not on file              Lab Results    Chemistry/lipid CBC Cardiac Enzymes/BNP/TSH/INR   Lab Results   Component Value Date    LDL 85 03/28/2013    CR 1.01 01/31/2022    BUN 12 01/31/2022    POTASSIUM 3.8 01/31/2022     01/31/2022    CO2 22 01/31/2022      Lab Results   Component Value Date    WBC 8.3 01/31/2022    HGB 12.5 01/31/2022    HCT 38.5 01/31/2022    MCV 91 01/31/2022     01/31/2022      Lab Results   Component Value Date    TROPONINI 0.01 12/16/2020    BNP 30 01/07/2021    NTBNP 73 01/31/2022    TSH 8.34 (H) 01/31/2022    INR 0.96 12/15/2020          Hai Ndiaye MD [facility]  Non-Invasive Cardiologist  [facility]  Pager [phone]    Thank you for allowing me to participate in the care of your patient.      Sincerely,     Hai Ndiaye MD     [facility]  [facility]  cc:   Hai Ndiaye MD  [address]  [place] [address]

## 2022-08-13 ENCOUNTER — HEALTH MAINTENANCE LETTER (OUTPATIENT)
Age: 42
End: 2022-08-13

## 2022-09-17 DIAGNOSIS — E06.3 HYPOTHYROIDISM DUE TO HASHIMOTO'S THYROIDITIS: ICD-10-CM

## 2022-09-18 NOTE — TELEPHONE ENCOUNTER
"Routing refill request to provider for review/approval because:  Labs out of range:  tsh    Last Written Prescription Date:  6/15/22  Last Fill Quantity: 90,  # refills: 0   Last office visit provider:  1/20/22     Requested Prescriptions   Pending Prescriptions Disp Refills     levothyroxine (SYNTHROID/LEVOTHROID) 50 MCG tablet [Pharmacy Med Name: LEVOTHYROXINE 0.05MG (50MCG) TAB] 90 tablet 0     Sig: TAKE 1 TABLET(50 MCG) BY MOUTH DAILY       Thyroid Protocol Failed - 9/17/2022  3:38 AM        Failed - Normal TSH on file in past 12 months     Recent Labs   Lab Test 01/31/22  0746   TSH 8.34*              Passed - Patient is 12 years or older        Passed - Recent (12 mo) or future (30 days) visit within the authorizing provider's specialty     Patient has had an office visit with the authorizing provider or a provider within the authorizing providers department within the previous 12 mos or has a future within next 30 days. See \"Patient Info\" tab in inbasket, or \"Choose Columns\" in Meds & Orders section of the refill encounter.              Passed - Medication is active on med list        Passed - No active pregnancy on record     If patient is pregnant or has had a positive pregnancy test, please check TSH.          Passed - No positive pregnancy test in past 12 months     If patient is pregnant or has had a positive pregnancy test, please check TSH.               Luz Dai RN 09/17/22 8:41 PM  "

## 2022-09-19 RX ORDER — LEVOTHYROXINE SODIUM 50 UG/1
TABLET ORAL
Qty: 90 TABLET | Refills: 0 | Status: SHIPPED | OUTPATIENT
Start: 2022-09-19 | End: 2023-01-17

## 2022-10-30 ENCOUNTER — HEALTH MAINTENANCE LETTER (OUTPATIENT)
Age: 42
End: 2022-10-30

## 2023-01-01 ENCOUNTER — TRANSFERRED RECORDS (OUTPATIENT)
Dept: MULTI SPECIALTY CLINIC | Facility: CLINIC | Age: 43
End: 2023-01-01

## 2023-01-01 LAB — RETINOPATHY: NORMAL

## 2023-01-17 ENCOUNTER — MYC MEDICAL ADVICE (OUTPATIENT)
Dept: FAMILY MEDICINE | Facility: CLINIC | Age: 43
End: 2023-01-17

## 2023-01-17 ENCOUNTER — OFFICE VISIT (OUTPATIENT)
Dept: FAMILY MEDICINE | Facility: CLINIC | Age: 43
End: 2023-01-17
Payer: COMMERCIAL

## 2023-01-17 VITALS
RESPIRATION RATE: 16 BRPM | DIASTOLIC BLOOD PRESSURE: 94 MMHG | HEART RATE: 89 BPM | SYSTOLIC BLOOD PRESSURE: 172 MMHG | TEMPERATURE: 97.8 F | OXYGEN SATURATION: 97 % | BODY MASS INDEX: 29.13 KG/M2 | WEIGHT: 144.25 LBS

## 2023-01-17 DIAGNOSIS — N92.0 MENORRHAGIA WITH REGULAR CYCLE: ICD-10-CM

## 2023-01-17 DIAGNOSIS — E06.3 HYPOTHYROIDISM DUE TO HASHIMOTO'S THYROIDITIS: ICD-10-CM

## 2023-01-17 DIAGNOSIS — N92.6 IRREGULAR PERIODS: ICD-10-CM

## 2023-01-17 DIAGNOSIS — I50.20 HEART FAILURE WITH REDUCED EJECTION FRACTION, NYHA CLASS II (H): Primary | ICD-10-CM

## 2023-01-17 DIAGNOSIS — R73.9 HYPERGLYCEMIA: ICD-10-CM

## 2023-01-17 DIAGNOSIS — E06.3 HASHIMOTO'S THYROIDITIS: ICD-10-CM

## 2023-01-17 DIAGNOSIS — E11.65 UNCONTROLLED TYPE 2 DIABETES MELLITUS WITH HYPERGLYCEMIA (H): Primary | ICD-10-CM

## 2023-01-17 DIAGNOSIS — I50.22 CHRONIC SYSTOLIC HEART FAILURE (H): ICD-10-CM

## 2023-01-17 DIAGNOSIS — E11.65 UNCONTROLLED TYPE 2 DIABETES MELLITUS WITH HYPERGLYCEMIA (H): ICD-10-CM

## 2023-01-17 DIAGNOSIS — E03.9 HYPOTHYROIDISM, UNSPECIFIED TYPE: ICD-10-CM

## 2023-01-17 DIAGNOSIS — D50.9 IRON DEFICIENCY ANEMIA, UNSPECIFIED IRON DEFICIENCY ANEMIA TYPE: ICD-10-CM

## 2023-01-17 DIAGNOSIS — I10 MALIGNANT ESSENTIAL HYPERTENSION: ICD-10-CM

## 2023-01-17 PROBLEM — D64.9 ANEMIA: Status: RESOLVED | Noted: 2017-04-12 | Resolved: 2023-01-17

## 2023-01-17 LAB
ALBUMIN SERPL BCG-MCNC: 4.1 G/DL (ref 3.5–5.2)
ALP SERPL-CCNC: 83 U/L (ref 35–104)
ALT SERPL W P-5'-P-CCNC: 76 U/L (ref 10–35)
ANION GAP SERPL CALCULATED.3IONS-SCNC: 16 MMOL/L (ref 7–15)
AST SERPL W P-5'-P-CCNC: 43 U/L (ref 10–35)
BILIRUB SERPL-MCNC: 0.3 MG/DL
BUN SERPL-MCNC: 12.3 MG/DL (ref 6–20)
CALCIUM SERPL-MCNC: 9.3 MG/DL (ref 8.6–10)
CHLORIDE SERPL-SCNC: 100 MMOL/L (ref 98–107)
CHOLEST SERPL-MCNC: 221 MG/DL
CREAT SERPL-MCNC: 0.87 MG/DL (ref 0.51–0.95)
DEPRECATED HCO3 PLAS-SCNC: 18 MMOL/L (ref 22–29)
ERYTHROCYTE [DISTWIDTH] IN BLOOD BY AUTOMATED COUNT: 12.3 % (ref 10–15)
GFR SERPL CREATININE-BSD FRML MDRD: 85 ML/MIN/1.73M2
GLUCOSE SERPL-MCNC: 302 MG/DL (ref 70–99)
HBA1C MFR BLD: 13.3 % (ref 0–5.6)
HCG UR QL: NEGATIVE
HCT VFR BLD AUTO: 41.9 % (ref 35–47)
HDLC SERPL-MCNC: 30 MG/DL
HGB BLD-MCNC: 13.8 G/DL (ref 11.7–15.7)
LDLC SERPL CALC-MCNC: ABNORMAL MG/DL
MCH RBC QN AUTO: 29 PG (ref 26.5–33)
MCHC RBC AUTO-ENTMCNC: 32.9 G/DL (ref 31.5–36.5)
MCV RBC AUTO: 88 FL (ref 78–100)
NONHDLC SERPL-MCNC: 191 MG/DL
NT-PROBNP SERPL-MCNC: 13 PG/ML (ref 0–450)
PLATELET # BLD AUTO: 239 10E3/UL (ref 150–450)
POTASSIUM SERPL-SCNC: 3.9 MMOL/L (ref 3.4–5.3)
PROT SERPL-MCNC: 7.6 G/DL (ref 6.4–8.3)
RBC # BLD AUTO: 4.76 10E6/UL (ref 3.8–5.2)
SODIUM SERPL-SCNC: 134 MMOL/L (ref 136–145)
T4 FREE SERPL-MCNC: 1.08 NG/DL (ref 0.9–1.7)
TRIGL SERPL-MCNC: 831 MG/DL
TSH SERPL DL<=0.005 MIU/L-ACNC: 18.1 UIU/ML (ref 0.3–4.2)
WBC # BLD AUTO: 9.4 10E3/UL (ref 4–11)

## 2023-01-17 PROCEDURE — 85027 COMPLETE CBC AUTOMATED: CPT | Performed by: PHYSICIAN ASSISTANT

## 2023-01-17 PROCEDURE — 83036 HEMOGLOBIN GLYCOSYLATED A1C: CPT | Performed by: PHYSICIAN ASSISTANT

## 2023-01-17 PROCEDURE — 84439 ASSAY OF FREE THYROXINE: CPT | Performed by: PHYSICIAN ASSISTANT

## 2023-01-17 PROCEDURE — 80053 COMPREHEN METABOLIC PANEL: CPT | Performed by: PHYSICIAN ASSISTANT

## 2023-01-17 PROCEDURE — 90686 IIV4 VACC NO PRSV 0.5 ML IM: CPT | Performed by: PHYSICIAN ASSISTANT

## 2023-01-17 PROCEDURE — 90471 IMMUNIZATION ADMIN: CPT | Performed by: PHYSICIAN ASSISTANT

## 2023-01-17 PROCEDURE — 36415 COLL VENOUS BLD VENIPUNCTURE: CPT | Performed by: PHYSICIAN ASSISTANT

## 2023-01-17 PROCEDURE — 99214 OFFICE O/P EST MOD 30 MIN: CPT | Mod: 25 | Performed by: PHYSICIAN ASSISTANT

## 2023-01-17 PROCEDURE — 84443 ASSAY THYROID STIM HORMONE: CPT | Performed by: PHYSICIAN ASSISTANT

## 2023-01-17 PROCEDURE — 81025 URINE PREGNANCY TEST: CPT | Performed by: PHYSICIAN ASSISTANT

## 2023-01-17 PROCEDURE — 0124A COVID-19 VACCINE BIVALENT BOOSTER 12+ (PFIZER): CPT | Performed by: PHYSICIAN ASSISTANT

## 2023-01-17 PROCEDURE — 83880 ASSAY OF NATRIURETIC PEPTIDE: CPT | Performed by: PHYSICIAN ASSISTANT

## 2023-01-17 PROCEDURE — 91312 COVID-19 VACCINE BIVALENT BOOSTER 12+ (PFIZER): CPT | Performed by: PHYSICIAN ASSISTANT

## 2023-01-17 PROCEDURE — 80061 LIPID PANEL: CPT | Performed by: PHYSICIAN ASSISTANT

## 2023-01-17 RX ORDER — LEVOTHYROXINE SODIUM 50 UG/1
50 TABLET ORAL DAILY
Qty: 90 TABLET | Refills: 3 | Status: SHIPPED | OUTPATIENT
Start: 2023-01-17 | End: 2023-01-18 | Stop reason: DRUGHIGH

## 2023-01-17 ASSESSMENT — ANXIETY QUESTIONNAIRES
6. BECOMING EASILY ANNOYED OR IRRITABLE: NOT AT ALL
GAD7 TOTAL SCORE: 5
7. FEELING AFRAID AS IF SOMETHING AWFUL MIGHT HAPPEN: SEVERAL DAYS
7. FEELING AFRAID AS IF SOMETHING AWFUL MIGHT HAPPEN: SEVERAL DAYS
8. IF YOU CHECKED OFF ANY PROBLEMS, HOW DIFFICULT HAVE THESE MADE IT FOR YOU TO DO YOUR WORK, TAKE CARE OF THINGS AT HOME, OR GET ALONG WITH OTHER PEOPLE?: NOT DIFFICULT AT ALL
4. TROUBLE RELAXING: SEVERAL DAYS
1. FEELING NERVOUS, ANXIOUS, OR ON EDGE: SEVERAL DAYS
GAD7 TOTAL SCORE: 5
IF YOU CHECKED OFF ANY PROBLEMS ON THIS QUESTIONNAIRE, HOW DIFFICULT HAVE THESE PROBLEMS MADE IT FOR YOU TO DO YOUR WORK, TAKE CARE OF THINGS AT HOME, OR GET ALONG WITH OTHER PEOPLE: NOT DIFFICULT AT ALL
3. WORRYING TOO MUCH ABOUT DIFFERENT THINGS: SEVERAL DAYS
GAD7 TOTAL SCORE: 5
5. BEING SO RESTLESS THAT IT IS HARD TO SIT STILL: NOT AT ALL
2. NOT BEING ABLE TO STOP OR CONTROL WORRYING: SEVERAL DAYS

## 2023-01-17 NOTE — PROGRESS NOTES
Subjective:    Be Alexis is a 42 year old female who presents with chief complaint of follow-up heart failure    1.  Follow-up heart failure  History of CHF.  Last office visit with cardiology was about 6 months ago.  I reviewed that.  They felt things were going pretty well and they wanted to keep her medicines the same.  She is taking medications as directed.  She has recently been worried about her oxygen level.  She has a watch that monitors her oxygen.  She says that sometimes she notes that her oxygen is at 88%, oftentimes when she is sleeping.  When she sees at this level, she gets quite concerned.  She then thinks she maybe has a panic attack or gets very anxious, and that also has a hard time breathing.  Last week she was overall not feeling well, this week does feel better.  Last week was having some type of cold sweats.  When she notices that she started to have a panic attack, or that her O2 is low, she takes some deep breaths and that does seem to help.  She has furosemide on her list as needed.  She is not taking this at all.  She has lost 5 pounds since her cardiology visit in June 2022.  She has not noticed any lower extremity edema.  During her visit today, her watch said her oxygen level was 94%, when we checked it here at our clinic oxygen read 97%.    Of note, she also notes that she might of had a yeast infection.  She is using OTC Monistat.  We reviewed she was due for a Pap and I offered to do that today.  After thought, she would like to wait and come back in the future to get that done.  Flu and COVID booster shot given today.    I personally reviewed cardiac cardiology office note from 6/15/2022.    Patient Active Problem List   Diagnosis     Seborrheic Dermatitis Of The Scalp     Hypothyroidism     Psoriasis     Menorrhagia with regular cycle     Iron deficiency anemia     Leiomyoma of uterus     Malignant essential hypertension     Hashimoto's thyroiditis     Chronic systolic heart  failure (H)     Heart failure with reduced ejection fraction, NYHA class II (H)     Uncontrolled type 2 diabetes mellitus with hyperglycemia (H)       Current Outpatient Medications:      furosemide (LASIX) 20 MG tablet, Take 20 mg by mouth daily as needed, Disp: , Rfl:      hydrALAZINE (APRESOLINE) 25 MG tablet, TAKE 1 TABLET(25 MG) BY MOUTH TWICE DAILY, Disp: 180 tablet, Rfl: 2     isosorbide dinitrate (ISORDIL) 10 MG tablet, Take 1 tablet (10 mg) by mouth 2 times daily, Disp: 180 tablet, Rfl: 3     levothyroxine (SYNTHROID/LEVOTHROID) 50 MCG tablet, Take 1 tablet (50 mcg) by mouth daily, Disp: 90 tablet, Rfl: 3     metoprolol succinate ER (TOPROL XL) 25 MG 24 hr tablet, Take 1 tablet (25 mg) by mouth daily, Disp: 90 tablet, Rfl: 3     multivitamin with minerals (THERA-M) 9 mg iron-400 mcg Tab tablet, [MULTIVITAMIN WITH MINERALS (THERA-M) 9 MG IRON-400 MCG TAB TABLET] Take 1 tablet by mouth daily., Disp: , Rfl: 0     triamcinolone (KENALOG) 0.5 % external ointment, Apply to affected areas daily, as needed., Disp: 30 g, Rfl: 1      Objective:   Allergies:  Lisinopril    Vitals:  Vitals:    01/17/23 0912 01/17/23 1006   BP: (!) 165/105 (!) 172/94   BP Location: Left arm    Patient Position: Sitting    Cuff Size: Adult Regular    Pulse: 89    Resp: 16    Temp: 97.8  F (36.6  C)    TempSrc: Temporal    SpO2: 97%    Weight: 65.4 kg (144 lb 4 oz)        Body mass index is 29.13 kg/m .    Vital signs reviewed.  General: Patient is alert and oriented x 3, in no apparent distress  Cardiac: regular rate and rhythm, no murmurs  Pulmonary: lungs clear to auscultation bilaterally, no crackles, rales, rhonchi, or wheezing noted  Extremities: No lower extremity edema noted bilaterally    Results for orders placed or performed in visit on 01/17/23   Comprehensive metabolic panel     Status: Abnormal   Result Value Ref Range    Sodium 134 (L) 136 - 145 mmol/L    Potassium 3.9 3.4 - 5.3 mmol/L    Chloride 100 98 - 107 mmol/L     Carbon Dioxide (CO2) 18 (L) 22 - 29 mmol/L    Anion Gap 16 (H) 7 - 15 mmol/L    Urea Nitrogen 12.3 6.0 - 20.0 mg/dL    Creatinine 0.87 0.51 - 0.95 mg/dL    Calcium 9.3 8.6 - 10.0 mg/dL    Glucose 302 (H) 70 - 99 mg/dL    Alkaline Phosphatase 83 35 - 104 U/L    AST 43 (H) 10 - 35 U/L    ALT 76 (H) 10 - 35 U/L    Protein Total 7.6 6.4 - 8.3 g/dL    Albumin 4.1 3.5 - 5.2 g/dL    Bilirubin Total 0.3 <=1.2 mg/dL    GFR Estimate 85 >60 mL/min/1.73m2   CBC with platelets     Status: Normal   Result Value Ref Range    WBC Count 9.4 4.0 - 11.0 10e3/uL    RBC Count 4.76 3.80 - 5.20 10e6/uL    Hemoglobin 13.8 11.7 - 15.7 g/dL    Hematocrit 41.9 35.0 - 47.0 %    MCV 88 78 - 100 fL    MCH 29.0 26.5 - 33.0 pg    MCHC 32.9 31.5 - 36.5 g/dL    RDW 12.3 10.0 - 15.0 %    Platelet Count 239 150 - 450 10e3/uL   TSH with free T4 reflex     Status: Abnormal   Result Value Ref Range    TSH 18.10 (H) 0.30 - 4.20 uIU/mL   Hemoglobin A1c     Status: Abnormal   Result Value Ref Range    Hemoglobin A1C 13.3 (H) 0.0 - 5.6 %   Lipid Profile     Status: Abnormal   Result Value Ref Range    Cholesterol 221 (H) <200 mg/dL    Triglycerides 831 (H) <150 mg/dL    Direct Measure HDL 30 (L) >=50 mg/dL    LDL Cholesterol Calculated      Non HDL Cholesterol 191 (H) <130 mg/dL    Narrative    Cholesterol  Desirable:  <200 mg/dL    Triglycerides  Normal:  Less than 150 mg/dL  Borderline High:  150-199 mg/dL  High:  200-499 mg/dL  Very High:  Greater than or equal to 500 mg/dL    Direct Measure HDL  Female:  Greater than or equal to 50 mg/dL   Male:  Greater than or equal to 40 mg/dL    LDL Cholesterol  Desirable:  <100mg/dL  Above Desirable:  100-129 mg/dL   Borderline High:  130-159 mg/dL   High:  160-189 mg/dL   Very High:  >= 190 mg/dL    Non HDL Cholesterol  Desirable:  130 mg/dL  Above Desirable:  130-159 mg/dL  Borderline High:  160-189 mg/dL  High:  190-219 mg/dL  Very High:  Greater than or equal to 220 mg/dL   HCG qualitative urine     Status:  Normal   Result Value Ref Range    hCG Urine Qualitative Negative Negative   BNP-N terminal pro     Status: Normal   Result Value Ref Range    N Terminal Pro BNP Outpatient 13 0 - 450 pg/mL     Other labs pending.      Assessment and Plan:   1. Heart failure with reduced ejection fraction, NYHA class II (H)  2. Chronic systolic heart failure (H)  3. Malignant essential hypertension  Chronic, stable.  Last visit with cardiology was 6/15/2022, and they recommended keeping her medicines the same and then follow-up in about 6 months.  She is due to schedule an appointment to see them.  She was concerned about her O2 saturation, see HPI.  I reviewed with her that O2 sat measured here in the clinic was normal, and not consistent with the measurement on her watch.  I recommended she get a regular pulse ox monitor instead of using her watch.  She will do that.  I reassured her that so far, lab and exams look stable.  Though blood pressures were both elevated today.  She states she is taking her medications as directed.  - Comprehensive metabolic panel  - CBC with platelets  - BNP-N terminal pro  - Lipid Profile    4. Hashimoto's thyroiditis  5. Hypothyroidism, unspecified type  Chronic, worsening.  I believe she had run out of her thyroid medicine for a little while, unclear how long.  TSH is abnormal, free T4 is pending.  Thyroid medicine refilled.  Restart medicine and recheck in 6 weeks.  - levothyroxine (SYNTHROID/LEVOTHROID) 50 MCG tablet; Take 1 tablet (50 mcg) by mouth daily  Dispense: 90 tablet; Refill: 3  - TSH with free T4 reflex  - T4 free    6. Iron deficiency anemia, unspecified iron deficiency anemia type  7. Menorrhagia with regular cycle  Chronic, stable.  Past history of anemia and menorrhagia.  No acute concerns today.  Screening hemoglobin checked and was normal.  - CBC with platelets    8. Irregular periods  Chronic, stable.  She states that she and her's  are actually planning for pregnancy  soon.  She is planning on seeing infertility specialist in the future.  She says that cardiology is aware of her plans, and they have adjusted her medicines to be most compatible with possible pregnancy.  - HCG qualitative urine    9. Uncontrolled type 2 diabetes mellitus with hyperglycemia (H)  10. Hyperglycemia  New diagnosis of type 2 diabetes.    A1c = 13.3%  She has hyperglycemia as well.  Random glucose today 302.  Given the complex nature of her medical history, could defer her to endocrine for further management, if needed.  Referral placed for diabetic educator.  Consider starting on metformin.  - Hemoglobin A1c  - Lipid Profile    33 minutes spent on the date of the encounter doing chart review, history and exam, and documentation.        This dictation uses voice recognition software, which may contain typographical errors.

## 2023-01-18 DIAGNOSIS — I50.20 HEART FAILURE WITH REDUCED EJECTION FRACTION, NYHA CLASS II (H): ICD-10-CM

## 2023-01-18 DIAGNOSIS — E06.3 HASHIMOTO'S THYROIDITIS: ICD-10-CM

## 2023-01-18 DIAGNOSIS — I10 MALIGNANT ESSENTIAL HYPERTENSION: ICD-10-CM

## 2023-01-18 DIAGNOSIS — E03.9 HYPOTHYROIDISM, UNSPECIFIED TYPE: Primary | ICD-10-CM

## 2023-01-18 DIAGNOSIS — E78.1 HYPERTRIGLYCERIDEMIA: ICD-10-CM

## 2023-01-18 DIAGNOSIS — E78.2 MIXED HYPERLIPIDEMIA: ICD-10-CM

## 2023-01-18 DIAGNOSIS — E11.65 UNCONTROLLED TYPE 2 DIABETES MELLITUS WITH HYPERGLYCEMIA (H): ICD-10-CM

## 2023-01-18 DIAGNOSIS — I50.22 CHRONIC SYSTOLIC HEART FAILURE (H): ICD-10-CM

## 2023-01-18 RX ORDER — LEVOTHYROXINE SODIUM 75 UG/1
75 TABLET ORAL DAILY
Qty: 90 TABLET | Refills: 3 | Status: SHIPPED | OUTPATIENT
Start: 2023-01-18 | End: 2023-12-31

## 2023-01-21 PROBLEM — N92.6 IRREGULAR PERIODS: Status: ACTIVE | Noted: 2023-01-21

## 2023-01-21 PROBLEM — R73.9 HYPERGLYCEMIA: Status: ACTIVE | Noted: 2023-01-21

## 2023-01-30 DIAGNOSIS — E11.65 UNCONTROLLED TYPE 2 DIABETES MELLITUS WITH HYPERGLYCEMIA (H): ICD-10-CM

## 2023-01-31 NOTE — TELEPHONE ENCOUNTER
90 day   Albendazole Counseling:  I discussed with the patient the risks of albendazole including but not limited to cytopenia, kidney damage, nausea/vomiting and severe allergy.  The patient understands that this medication is being used in an off-label manner.

## 2023-02-03 ENCOUNTER — ALLIED HEALTH/NURSE VISIT (OUTPATIENT)
Dept: EDUCATION SERVICES | Facility: CLINIC | Age: 43
End: 2023-02-03
Payer: COMMERCIAL

## 2023-02-03 DIAGNOSIS — E11.65 UNCONTROLLED TYPE 2 DIABETES MELLITUS WITH HYPERGLYCEMIA (H): ICD-10-CM

## 2023-02-03 PROCEDURE — G0108 DIAB MANAGE TRN  PER INDIV: HCPCS | Performed by: DIETITIAN, REGISTERED

## 2023-02-03 PROCEDURE — 99207 PR DROP WITH A PROCEDURE: CPT | Performed by: DIETITIAN, REGISTERED

## 2023-02-03 NOTE — PATIENT INSTRUCTIONS
Check blood sugar twice daily: fasting and two hours after a meal. Goal for fasting is under 130 and two hours after a meal goal is under 180.   Weight loss of 5# for next weight loss goal by 4/29/23.   Exercise: maintain 3x/week for 30 minutes and work towards 150 minutes/week. Either add another day or time to your workout.   Start Metformin one tablet/day, take with food.   Add in a protein source for lunch.

## 2023-02-03 NOTE — LETTER
2/3/2023         RE: Be Alexis  [address]  [facility] [address]        Dear Colleague,    Thank you for referring your patient, Be Alexis, to the [facility]. Please see a copy of my visit note below.    Diabetes Self-Management Education & Support/ 60 minutes    Presents for:      Type of Service: In Person Visit    Assessment Type:   ASSESSMENT:  Initial Dm education for Be due to new diagnosis.  Be reported that she was surprised at her diagnosis as she has not had any s/s of hyperglycemia except for occasional polydipsia. She did report of weight gain and increased stress in the past though. Her spouse has been very supportive in helping Be make healthy lifestyle changes and add in exercise. She has cut out sweets, sugary drinks and cereal. She is limiting portions of rice and has been exercising three times/week for 30 minutes. Be has a treadmill and exercise teetee for indoor use. Weight has decreased 9# since fall of 2022. Be is taking metformin 500 mg, two tablets daily and she is checking her FBS, she did not have her meter at today's visit. Sleep =7.5 hours. She does not use tobacco or drink ETOH.  Be reported that she is very motivated to make healthy changes and she and her spouse would like to have a baby. We did discuss the importance of having very well managed diabetes and lowering A1C to recommended level before- planning a pregnancy.     Patient's most recent   Lab Results   Component Value Date    A1C 13.3 01/17/2023     is not meeting goal of <8.0    Diabetes knowledge and skills assessment:   Patient is knowledgeable in diabetes management concepts related to: Healthy Eating, Being Active and Taking Medication    Continue education with the following diabetes management concepts: Monitoring, reducing risks and problem solving    Based on learning assessment above, most appropriate setting for further diabetes education would be: Group class  "or Individual setting.      PLAN      Topics to cover at upcoming visits: Monitoring, Problem Solving and Reducing Risks    Follow-up: 2/15/23    See Care Plan for co-developed, patient-state behavior change goals.  AVS provided for patient today.    Education Materials Provided:  Living Healthy with Diabetes and My Plate Planner      SUBJECTIVE/OBJECTIVE:  Diabetes education in the past 24mo: No  Diabetes type: Type 2  Disease course: Other  How confident are you filling out medical forms by yourself:: Quite a bit  Cultural Influences/Ethnic Background:  Not  or       Diabetes Symptoms & Complications:  Fatigue: No  Neuropathy: No  Polydipsia: Sometimes  Polyphagia: No  Polyuria: No  Visual change: No  Slow healing wounds: No  Autonomic neuropathy: No  CVA: No  Heart disease: No  Nephropathy: No  Peripheral neuropathy: No  Peripheral Vascular Disease: No  Retinopathy: No  Sexual dysfunction: No    Patient Problem List and Family Medical History reviewed for relevant medical history, current medical status, and diabetes risk factors.    Vitals:  There were no vitals taken for this visit.  Estimated body mass index is 29.13 kg/m  as calculated from the following:    Height as of 12/16/21: 1.499 m (4' 11\").    Weight as of 1/17/23: 65.4 kg (144 lb 4 oz).   Last 3 BP:   BP Readings from Last 3 Encounters:   01/17/23 (!) 172/94   06/15/22 (!) 138/98   12/16/21 102/70       History   Smoking Status     Never   Smokeless Tobacco     Never       Labs:  Lab Results   Component Value Date    A1C 13.3 01/17/2023     Lab Results   Component Value Date     01/17/2023     01/31/2022     Lab Results   Component Value Date    LDL  01/17/2023      Comment:      Cannot estimate LDL when triglyceride exceeds 400 mg/dL    LDL 85 03/28/2013     Direct Measure HDL   Date Value Ref Range Status   01/17/2023 30 (L) >=50 mg/dL Final   ]  GFR Estimate   Date Value Ref Range Status   01/17/2023 85 >60 " mL/min/1.73m2 Final     Comment:     Effective December 21, 2021 eGFRcr in adults is calculated using the 2021 CKD-EPI creatinine equation which includes age and gender (Ralph et al., [place], DOI: 10.1056/WEZSag4271532)   03/30/2021 56 (L) >60 mL/min/1.73m2 Final     GFR Estimate If Black   Date Value Ref Range Status   03/30/2021 >60 >60 mL/min/1.73m2 Final     Lab Results   Component Value Date    CR 0.87 01/17/2023     No results found for: MICROALBUMIN    Healthy Eating:  Cultural/Nondenominational diet restrictions?: No  Meal planning/habits: None  How many times a week on average do you eat food made away from home (restaurant/take-out)?: 1  Meals include: Lunch, Dinner, Morning Snack, Afternoon Snack, Breakfast  Breakfast: omelette, cilantro, water  Lunch: apples  Dinner: protein, vegetable/  Other: doing Hello fresh for awhile, using recipe ideas, cut out juice, regular soda  Beverages: Water, Tea, Juice    Being Active:  Days per week of moderate to strenuous exercise (like a brisk walk): (P) 3  On average, minutes per day of exercise at this level: (P) 30  How intense was your typical exercise? : (P) Moderate (like brisk walking)  Exercise Minutes per Week: (P) 90  Barrier to exercise: None    Monitoring:  Blood Glucose Meter: Other  Times checking blood sugar at home (number): 1  Times checking blood sugar at home (per): Day  Blood glucose trend: Decreasing        Taking Medications:  Diabetes Medication(s)     Biguanides       metFORMIN (GLUCOPHAGE) 500 MG tablet    TAKE 1 TABLET BY MOUTH EVERY DAY WITH LARGEST MEAL FOR 1 WEEK. THEN INCREASE TO 1 TABLET BY MOUTH 2 TIMES A DAY          Current Treatments: None    Problem Solving:                 Reducing Risks:  CAD Risks: Obesity, Sedentary lifestyle, Stress  Has dilated eye exam at least once a year?: No  Sees dentist every 6 months?: Yes  Feet checked by healthcare provider in the last year?: Yes    Healthy Coping:  Informal Support system:: Family, Friends,  Parent, Spouse  Patient Activation Measure Survey Score:  No flowsheet data found.      Care Plan and Education Provided:  Care Plan: Diabetes   Updates made by Guadalupe Hung RD since 2/3/2023 12:00 AM      Problem: HbA1C Not In Goal       Goal: Establish Regular Follow-Ups with PCP       Task: Discuss with PCP the recommended timing for patient's next follow up visit(s)    Responsible User: Guadalupe Hung RD      Task: Discuss schedule for PCP visits with patient    Responsible User: Guadalupe Hung RD      Goal: Get HbA1C Level in Goal       Task: Educate patient on diabetes education self-management topics    Responsible User: Guadalupe Hung RD      Task: Educate patient on benefits of regular glucose monitoring    Responsible User: Guadalupe Hung RD      Task: Refer patient to appropriate extended care team member, as needed (Medication Therapy Management, Behavioral Health, Physical Therapy, etc.)    Responsible User: Guadalupe Hung RD      Task: Discuss diabetes treatment plan with patient    Responsible User: Guadalupe Hung RD      Problem: Diabetes Self-Management Education Needed to Optimize Self-Care Behaviors       Goal: Understand diabetes pathophysiology and disease progression       Task: Provide education on diabetes pathophysiology and disease progression specfic to patient's diabetes type Completed 2/3/2023   Responsible User: Guadalupe Hung RD      Goal: Healthy Eating - follow a healthy eating pattern for diabetes       Task: Provide education on portion control and consistency in amount, composition and timing of food intake Completed 2/3/2023   Responsible User: Guadalupe Hung RD      Task: Provide education on managing carbohydrate intake (carbohydrate counting, plate planning method, etc.) Completed 2/3/2023   Responsible User: Guadalupe Hung RD      Task: Provide education on weight management Completed 2/3/2023   Responsible User: Guadalupe Hung RD      Task: Provide education on  heart healthy eating    Responsible User: Guadalupe Hung RD      Task: Provide education on eating out    Responsible User: Guadalupe Hung RD      Task: Develop individualized healthy eating plan with patient    Responsible User: Guadalupe Hung RD      Goal: Being Active - get regular physical activity, working up to at least 150 minutes per week    This Visit's Progress: 100%   Note:    Maintain a minimum of 30 minutes, 3x/week. Add either time or frequency to your workouts to reach 150 minutes/week, unless otherwise recommended by your doctor.       Task: Provide education on relationship of activity to glucose and precautions to take if at risk for low glucose    Responsible User: Guadalupe Hung RD      Task: Discuss barriers to physical activity with patient    Responsible User: Guadalupe Hung RD      Task: Develop physical activity plan with patient Completed 2/3/2023   Responsible User: Guadalupe Hung RD      Task: Explore community resources including walking groups, assistance programs, and home videos    Responsible User: Guadalupe Hung RD      Goal: Monitoring - monitor glucose and ketones as directed    This Visit's Progress: 50%   Note:    Check glucose twice daily: fasting and  two hours after a meal.      Task: Provide education on blood glucose monitoring (purpose, proper technique, frequency, glucose targets, interpreting results, when to use glucose control solution, sharps disposal)    Responsible User: Guadalupe Hung RD      Task: Provide education on continuous glucose monitoring (sensor placement, use of winsome or /reader, understanding glucose trends, alerts and alarms, differences between sensor glucose and blood glucose)    Responsible User: Guadalupe Hung RD      Task: Provide education on ketone monitoring (when to monitor, frequency, etc.)    Responsible User: Guadalupe Hung RD      Goal: Taking Medication - patient is consistently taking medications as directed       Task:  Provide education on action of prescribed medication, including when to take and possible side effects    Responsible User: Guadalupe Hung RD      Task: Provide education on insulin and injectable diabetes medications, including administration, storage, site selection and rotation for injection sites    Responsible User: Guadalupe Hung RD      Task: Discuss barriers to medication adherence with patient and provide management technique ideas as appropriate    Responsible User: Guadalupe Hung RD      Task: Provide education on frequency and refill details of medications    Responsible User: Guadalupe Hung RD      Goal: Problem Solving - know how to prevent and manage short-term diabetes complications       Task: Provide education on high blood glucose - causes, signs/symptoms, prevention and treatment    Responsible User: Guadalupe Hung RD      Task: Provide education on low blood glucose - causes, signs/symptoms, prevention, treatment, carrying a carbohydrate source at all times, and medical identification    Responsible User: Guadalupe Hung RD      Task: Provide education on safe travel with diabetes    Responsible User: Guadalupe Hung RD      Task: Provide education on how to care for diabetes on sick days    Responsible User: Guadalupe Hung RD      Task: Provide education on when to call a health care provider    Responsible User: Guadalupe Hung RD      Goal: Reducing Risks - know how to prevent and treat long-term diabetes complications       Task: Provide education on major complications of diabetes, prevention, early diagnostic measures and treatment of complications    Responsible User: Guadalupe Hung RD      Task: Provide education on recommended care for dental, eye and foot health    Responsible User: Guadalupe Hung RD      Task: Provide education on Hemoglobin A1c - goals and relationship to blood glucose levels    Responsible User: Guadalupe Hung RD      Task: Provide education on  recommendations for heart health - lipid levels and goals, blood pressure and goals, and aspirin therapy, if indicated    Responsible User: Guadalupe Hung RD      Task: Provide education on tobacco cessation    Responsible User: Guadalupe Hung RD      Goal: Healthy Coping - use available resources to cope with the challenges of managing diabetes       Task: Discuss recognizing feelings about having diabetes    Responsible User: Guadalupe Hung RD      Task: Provide education on the benefits of making appropriate lifestyle changes    Responsible User: Guadalupe Hung RD      Task: Provide education on benefits of utilizing support systems    Responsible User: Guadalupe Hung RD      Task: Discuss methods for coping with stress    Responsible User: Guadalupe Hung RD      Task: Provide education on when to seek professional counseling    Responsible User: Guadalupe Hung RD              Time Spent: 60 minutes  Encounter Type: Individual  SHAVONNE Rodriguez RD    Any diabetes medication dose changes were made via the CDE Protocol per the patient's primary care provider. A copy of this encounter was shared with the provider.

## 2023-02-04 NOTE — PROGRESS NOTES
Diabetes Self-Management Education & Support/ 60 minutes    Presents for:      Type of Service: In Person Visit    Assessment Type:   ASSESSMENT:  Initial Dm education for Be due to new diagnosis.  Be reported that she was surprised at her diagnosis as she has not had any s/s of hyperglycemia except for occasional polydipsia. She did report of weight gain and increased stress in the past though. Her spouse has been very supportive in helping Be make healthy lifestyle changes and add in exercise. She has cut out sweets, sugary drinks and cereal. She is limiting portions of rice and has been exercising three times/week for 30 minutes. Be has a treadmill and exercise teetee for indoor use. Weight has decreased 9# since fall of 2022. Be is taking metformin 500 mg, two tablets daily and she is checking her FBS, she did not have her meter at today's visit. Sleep =7.5 hours. She does not use tobacco or drink ETOH.  Be reported that she is very motivated to make healthy changes and she and her spouse would like to have a baby. We did discuss the importance of having very well managed diabetes and lowering A1C to recommended level before- planning a pregnancy.     Patient's most recent   Lab Results   Component Value Date    A1C 13.3 01/17/2023     is not meeting goal of <8.0    Diabetes knowledge and skills assessment:   Patient is knowledgeable in diabetes management concepts related to: Healthy Eating, Being Active and Taking Medication    Continue education with the following diabetes management concepts: Monitoring, reducing risks and problem solving    Based on learning assessment above, most appropriate setting for further diabetes education would be: Group class or Individual setting.      PLAN      Topics to cover at upcoming visits: Monitoring, Problem Solving and Reducing Risks    Follow-up: 2/15/23    See Care Plan for co-developed, patient-state behavior change goals.  AVS provided for patient  "today.    Education Materials Provided:  Living Healthy with Diabetes and My Plate Planner      SUBJECTIVE/OBJECTIVE:  Diabetes education in the past 24mo: No  Diabetes type: Type 2  Disease course: Other  How confident are you filling out medical forms by yourself:: Quite a bit  Cultural Influences/Ethnic Background:  Not  or       Diabetes Symptoms & Complications:  Fatigue: No  Neuropathy: No  Polydipsia: Sometimes  Polyphagia: No  Polyuria: No  Visual change: No  Slow healing wounds: No  Autonomic neuropathy: No  CVA: No  Heart disease: No  Nephropathy: No  Peripheral neuropathy: No  Peripheral Vascular Disease: No  Retinopathy: No  Sexual dysfunction: No    Patient Problem List and Family Medical History reviewed for relevant medical history, current medical status, and diabetes risk factors.    Vitals:  There were no vitals taken for this visit.  Estimated body mass index is 29.13 kg/m  as calculated from the following:    Height as of 12/16/21: 1.499 m (4' 11\").    Weight as of 1/17/23: 65.4 kg (144 lb 4 oz).   Last 3 BP:   BP Readings from Last 3 Encounters:   01/17/23 (!) 172/94   06/15/22 (!) 138/98   12/16/21 102/70       History   Smoking Status     Never   Smokeless Tobacco     Never       Labs:  Lab Results   Component Value Date    A1C 13.3 01/17/2023     Lab Results   Component Value Date     01/17/2023     01/31/2022     Lab Results   Component Value Date    LDL  01/17/2023      Comment:      Cannot estimate LDL when triglyceride exceeds 400 mg/dL    LDL 85 03/28/2013     Direct Measure HDL   Date Value Ref Range Status   01/17/2023 30 (L) >=50 mg/dL Final   ]  GFR Estimate   Date Value Ref Range Status   01/17/2023 85 >60 mL/min/1.73m2 Final     Comment:     Effective December 21, 2021 eGFRcr in adults is calculated using the 2021 CKD-EPI creatinine equation which includes age and gender (Ralph et al., NEJ, DOI: 10.1056/STFHdn5267214)   03/30/2021 56 (L) >60 " mL/min/1.73m2 Final     GFR Estimate If Black   Date Value Ref Range Status   03/30/2021 >60 >60 mL/min/1.73m2 Final     Lab Results   Component Value Date    CR 0.87 01/17/2023     No results found for: MICROALBUMIN    Healthy Eating:  Cultural/Samaritan diet restrictions?: No  Meal planning/habits: None  How many times a week on average do you eat food made away from home (restaurant/take-out)?: 1  Meals include: Lunch, Dinner, Morning Snack, Afternoon Snack, Breakfast  Breakfast: omelette, cilantro, water  Lunch: apples  Dinner: protein, vegetable/  Other: doing Hello fresh for awhile, using recipe ideas, cut out juice, regular soda  Beverages: Water, Tea, Juice    Being Active:  Days per week of moderate to strenuous exercise (like a brisk walk): (P) 3  On average, minutes per day of exercise at this level: (P) 30  How intense was your typical exercise? : (P) Moderate (like brisk walking)  Exercise Minutes per Week: (P) 90  Barrier to exercise: None    Monitoring:  Blood Glucose Meter: Other  Times checking blood sugar at home (number): 1  Times checking blood sugar at home (per): Day  Blood glucose trend: Decreasing        Taking Medications:  Diabetes Medication(s)     Biguanides       metFORMIN (GLUCOPHAGE) 500 MG tablet    TAKE 1 TABLET BY MOUTH EVERY DAY WITH LARGEST MEAL FOR 1 WEEK. THEN INCREASE TO 1 TABLET BY MOUTH 2 TIMES A DAY          Current Treatments: None    Problem Solving:                 Reducing Risks:  CAD Risks: Obesity, Sedentary lifestyle, Stress  Has dilated eye exam at least once a year?: No  Sees dentist every 6 months?: Yes  Feet checked by healthcare provider in the last year?: Yes    Healthy Coping:  Informal Support system:: Family, Friends, Parent, Spouse  Patient Activation Measure Survey Score:  No flowsheet data found.      Care Plan and Education Provided:  Care Plan: Diabetes   Updates made by Guadalupe Hung RD since 2/3/2023 12:00 AM      Problem: HbA1C Not In Goal        Goal: Establish Regular Follow-Ups with PCP       Task: Discuss with PCP the recommended timing for patient's next follow up visit(s)    Responsible User: Guadalupe Hung RD      Task: Discuss schedule for PCP visits with patient    Responsible User: Guadalupe Hung RD      Goal: Get HbA1C Level in Goal       Task: Educate patient on diabetes education self-management topics    Responsible User: Guadalupe Hung RD      Task: Educate patient on benefits of regular glucose monitoring    Responsible User: Guadalupe Hung RD      Task: Refer patient to appropriate extended care team member, as needed (Medication Therapy Management, Behavioral Health, Physical Therapy, etc.)    Responsible User: Guadalupe Hung RD      Task: Discuss diabetes treatment plan with patient    Responsible User: Guadalupe Hung RD      Problem: Diabetes Self-Management Education Needed to Optimize Self-Care Behaviors       Goal: Understand diabetes pathophysiology and disease progression       Task: Provide education on diabetes pathophysiology and disease progression specfic to patient's diabetes type Completed 2/3/2023   Responsible User: Guadalupe Hung RD      Goal: Healthy Eating - follow a healthy eating pattern for diabetes       Task: Provide education on portion control and consistency in amount, composition and timing of food intake Completed 2/3/2023   Responsible User: Guadalupe Hung RD      Task: Provide education on managing carbohydrate intake (carbohydrate counting, plate planning method, etc.) Completed 2/3/2023   Responsible User: Guadalupe Hung RD      Task: Provide education on weight management Completed 2/3/2023   Responsible User: Guadalupe Hung RD      Task: Provide education on heart healthy eating    Responsible User: Guadalupe Hung RD      Task: Provide education on eating out    Responsible User: Guadalupe Hung RD      Task: Develop individualized healthy eating plan with patient    Responsible User:  Guadalupe Hung RD      Goal: Being Active - get regular physical activity, working up to at least 150 minutes per week    This Visit's Progress: 100%   Note:    Maintain a minimum of 30 minutes, 3x/week. Add either time or frequency to your workouts to reach 150 minutes/week, unless otherwise recommended by your doctor.       Task: Provide education on relationship of activity to glucose and precautions to take if at risk for low glucose    Responsible User: Guadalupe Hung RD      Task: Discuss barriers to physical activity with patient    Responsible User: Guadalupe Hung RD      Task: Develop physical activity plan with patient Completed 2/3/2023   Responsible User: Guadalupe Hung RD      Task: Explore community resources including walking groups, assistance programs, and home videos    Responsible User: Guadalupe Hung RD      Goal: Monitoring - monitor glucose and ketones as directed    This Visit's Progress: 50%   Note:    Check glucose twice daily: fasting and  two hours after a meal.      Task: Provide education on blood glucose monitoring (purpose, proper technique, frequency, glucose targets, interpreting results, when to use glucose control solution, sharps disposal)    Responsible User: Guadalupe Hung RD      Task: Provide education on continuous glucose monitoring (sensor placement, use of winsome or /reader, understanding glucose trends, alerts and alarms, differences between sensor glucose and blood glucose)    Responsible User: Guadalupe Hung RD      Task: Provide education on ketone monitoring (when to monitor, frequency, etc.)    Responsible User: Guadalupe Hung RD      Goal: Taking Medication - patient is consistently taking medications as directed       Task: Provide education on action of prescribed medication, including when to take and possible side effects    Responsible User: Guadalupe Hung RD      Task: Provide education on insulin and injectable diabetes medications, including  administration, storage, site selection and rotation for injection sites    Responsible User: Guadalupe Hung RD      Task: Discuss barriers to medication adherence with patient and provide management technique ideas as appropriate    Responsible User: Guadalupe Hung RD      Task: Provide education on frequency and refill details of medications    Responsible User: Guadalupe Hung RD      Goal: Problem Solving - know how to prevent and manage short-term diabetes complications       Task: Provide education on high blood glucose - causes, signs/symptoms, prevention and treatment    Responsible User: Guadalupe Hung RD      Task: Provide education on low blood glucose - causes, signs/symptoms, prevention, treatment, carrying a carbohydrate source at all times, and medical identification    Responsible User: Guadalupe Hung RD      Task: Provide education on safe travel with diabetes    Responsible User: Guadalupe Hung RD      Task: Provide education on how to care for diabetes on sick days    Responsible User: Guadalupe Hung RD      Task: Provide education on when to call a health care provider    Responsible User: Guadalupe Hung RD      Goal: Reducing Risks - know how to prevent and treat long-term diabetes complications       Task: Provide education on major complications of diabetes, prevention, early diagnostic measures and treatment of complications    Responsible User: Guadalupe Hung RD      Task: Provide education on recommended care for dental, eye and foot health    Responsible User: Guadalupe Hung RD      Task: Provide education on Hemoglobin A1c - goals and relationship to blood glucose levels    Responsible User: Guadalupe Hung RD      Task: Provide education on recommendations for heart health - lipid levels and goals, blood pressure and goals, and aspirin therapy, if indicated    Responsible User: Guadalupe Hung RD      Task: Provide education on tobacco cessation    Responsible User: Abhilash  LEONIDAS Butcher      Goal: Healthy Coping - use available resources to cope with the challenges of managing diabetes       Task: Discuss recognizing feelings about having diabetes    Responsible User: Guadalupe Hung RD      Task: Provide education on the benefits of making appropriate lifestyle changes    Responsible User: Guadalupe Hung RD      Task: Provide education on benefits of utilizing support systems    Responsible User: Guadalupe Hung RD      Task: Discuss methods for coping with stress    Responsible User: Guadalupe Hung RD      Task: Provide education on when to seek professional counseling    Responsible User: Guadalupe Hung RD              Time Spent: 60 minutes  Encounter Type: Individual  SHAVONNE Rodriguez RD    Any diabetes medication dose changes were made via the CDE Protocol per the patient's primary care provider. A copy of this encounter was shared with the provider.

## 2023-02-15 ENCOUNTER — VIRTUAL VISIT (OUTPATIENT)
Dept: EDUCATION SERVICES | Facility: CLINIC | Age: 43
End: 2023-02-15
Payer: COMMERCIAL

## 2023-02-15 DIAGNOSIS — E11.65 UNCONTROLLED TYPE 2 DIABETES MELLITUS WITH HYPERGLYCEMIA (H): Primary | ICD-10-CM

## 2023-02-15 PROCEDURE — 98967 PH1 ASSMT&MGMT NQHP 11-20: CPT | Mod: 95 | Performed by: DIETITIAN, REGISTERED

## 2023-02-15 RX ORDER — LANCETS
EACH MISCELLANEOUS
Qty: 100 EACH | Refills: 4 | Status: SHIPPED | OUTPATIENT
Start: 2023-02-15 | End: 2024-08-06

## 2023-02-15 RX ORDER — BLOOD-GLUCOSE METER
1 EACH MISCELLANEOUS PRN
Qty: 1 KIT | Refills: 0 | Status: SHIPPED | OUTPATIENT
Start: 2023-02-15

## 2023-02-15 RX ORDER — BLOOD SUGAR DIAGNOSTIC
STRIP MISCELLANEOUS
Qty: 100 STRIP | Refills: 6 | Status: SHIPPED | OUTPATIENT
Start: 2023-02-15 | End: 2024-08-06

## 2023-02-15 NOTE — LETTER
2/15/2023         RE: Be Alexis  [address]  [facility] [facility] [address]        Dear Colleague,    Thank you for referring your patient, Be Alexis, to the [facility]. Please see a copy of my visit note below.    Type of Service: Telephone Visit/ 18 minutes    Originating Location (Patient Location): Home  Distant Location (Provider Location): [facility]  Mode of Communication:  Telephone    Telephone Visit Start Time: 11am  Telephone Visit End Time (telephone visit stop time): 11:18    How would patient like to obtain AVS? MyChart     Diabetes Education Follow-up    Subjective/Objective:    Follow up on goals and BG log for Be Alexis. Last date of communication was: 2/3/23.    Diabetes is being managed with   Lifestyle (diet/activity), Diabetes Medications   Diabetes Medication(s)     Biguanides       metFORMIN (GLUCOPHAGE) 500 MG tablet    TAKE 2 TABLETS( 1000 MG) WITH BREAKFAST AND TWO TABLETS( 1000 MG) WITH DINNER.          BG/ Log:   FBS: 187,149,150,190,139,140,134,147,171,150,156,170  PP: 189,188,200,200,197,189    Assessment:  Be continues to exercise 3x/week for 30 minutes, she noticed BG lower on days of exercise and is going to work on adding more exercise days to her routine.  She has been checking her glucose twice daily and taking metformin 500 mg, two tablets daily.  Be continues to work on eating healthy, cutting out sweets and sugary drinks, and limiting portions of rice to 1/2 c per meal.     Plan/Response:  See Patient Instructions for co-developed, patient-stated behavior change goals.    1. Increase Metformin to two tablets in the morning and one tablet in the evening.  On 2/22/23 increase Metformin to two tablets in the morning and two tablets in the evening.  2. Test blood sugar twice daily: fasting and two hours after a meal.  3. Add in 1-2 days for exercise: 15-20 minutes   4. Follow up with educator on 3/17/23      Any  diabetes medication dose changes were made via the CDE Protocol and Collaborative Practice Agreement with the patient's primary care provider. A copy of this encounter was shared with the provider.    Guadalupe Hung RD, [facility]

## 2023-02-15 NOTE — PROGRESS NOTES
Type of Service: Telephone Visit/ 18 minutes    Originating Location (Patient Location): Home  Distant Location (Provider Location): [facility]  Mode of Communication:  Telephone    Telephone Visit Start Time: 11am  Telephone Visit End Time (telephone visit stop time): 11:18    How would patient like to obtain AVS? MyChart     Diabetes Education Follow-up    Subjective/Objective:    Follow up on goals and BG log for Be Alexis. Last date of communication was: 2/3/23.    Diabetes is being managed with   Lifestyle (diet/activity), Diabetes Medications   Diabetes Medication(s)     Biguanides       metFORMIN (GLUCOPHAGE) 500 MG tablet    TAKE 2 TABLETS( 1000 MG) WITH BREAKFAST AND TWO TABLETS( 1000 MG) WITH DINNER.          BG/ Log:   FBS: 187,149,150,190,139,140,134,147,171,150,156,170  PP: 189,188,200,200,197,189    Assessment:  Be continues to exercise 3x/week for 30 minutes, she noticed BG lower on days of exercise and is going to work on adding more exercise days to her routine.  She has been checking her glucose twice daily and taking metformin 500 mg, two tablets daily.  Be continues to work on eating healthy, cutting out sweets and sugary drinks, and limiting portions of rice to 1/2 c per meal.     Plan/Response:  See Patient Instructions for co-developed, patient-stated behavior change goals.    1. Increase Metformin to two tablets in the morning and one tablet in the evening.  On 2/22/23 increase Metformin to two tablets in the morning and two tablets in the evening.  2. Test blood sugar twice daily: fasting and two hours after a meal.  3. Add in 1-2 days for exercise: 15-20 minutes   4. Follow up with educator on 3/17/23      Any diabetes medication dose changes were made via the CDE Protocol and Collaborative Practice Agreement with the patient's primary care provider. A copy of this encounter was shared with the provider.    Guadalupe Hung RD, [facility]

## 2023-02-15 NOTE — PATIENT INSTRUCTIONS
Increase Metformin to two tablets in the morning and one tablet in the evening.  On 2/22/23 increase Metformin to two tablets in the morning and two tablets in the evening.  Test blood sugar twice daily: fasting and two hours after a meal.  Add in 1-2 days for exercise: 15-20 minutes   Follow up with educator on 3/17/23

## 2023-02-22 NOTE — TELEPHONE ENCOUNTER
"Routing refill request to provider for review/approval because:  Labs out of range:  TSH.   Break in medication.    Last Written Prescription Date:  01/20/2022  Last Fill Quantity: 30,  # refills: 0   Last office visit provider: 01/20/2022      Requested Prescriptions   Pending Prescriptions Disp Refills     levothyroxine (SYNTHROID/LEVOTHROID) 50 MCG tablet 30 tablet 0     Sig: Take 1 tablet (50 mcg) by mouth daily       Thyroid Protocol Failed - 3/14/2022  1:05 PM        Failed - Normal TSH on file in past 12 months     Recent Labs   Lab Test 01/31/22  0746   TSH 8.34*              Passed - Patient is 12 years or older        Passed - Recent (12 mo) or future (30 days) visit within the authorizing provider's specialty     Patient has had an office visit with the authorizing provider or a provider within the authorizing providers department within the previous 12 mos or has a future within next 30 days. See \"Patient Info\" tab in inbasket, or \"Choose Columns\" in Meds & Orders section of the refill encounter.              Passed - Medication is active on med list        Passed - No active pregnancy on record     If patient is pregnant or has had a positive pregnancy test, please check TSH.          Passed - No positive pregnancy test in past 12 months     If patient is pregnant or has had a positive pregnancy test, please check TSH.               Sivan Powers 03/14/22 8:25 PM  " Pt needs at [facility] [place], not [facility]

## 2023-03-17 ENCOUNTER — VIRTUAL VISIT (OUTPATIENT)
Dept: EDUCATION SERVICES | Facility: CLINIC | Age: 43
End: 2023-03-17
Payer: COMMERCIAL

## 2023-03-17 DIAGNOSIS — E11.65 UNCONTROLLED TYPE 2 DIABETES MELLITUS WITH HYPERGLYCEMIA (H): Primary | ICD-10-CM

## 2023-03-17 PROCEDURE — 98967 PH1 ASSMT&MGMT NQHP 11-20: CPT | Mod: 93 | Performed by: DIETITIAN, REGISTERED

## 2023-03-17 NOTE — PATIENT INSTRUCTIONS
Schedule lab visit for after 4/18/23 to recheck A1C.  Continue to check blood sugars twice daily: fasting and two hours after a meal.  Take medications as prescribed.  Exercise a minimum of 3x/week for 35+ minutes, add in intensity carefully on the treadmill by increasing incline.  Follow up with educator on 5/9/23

## 2023-03-17 NOTE — LETTER
3/17/2023         RE: Be Alexis  [address]  [place] [address]        Dear Colleague,    Thank you for referring your patient, Be Alexis, to the [facility]. Please see a copy of my visit note below.    Type of Service: Telephone Visit/ 18 minutes    Originating Location (Patient Location): Home  Distant Location (Provider Location): [facility]  Mode of Communication:  Telephone    Telephone Visit Start Time: 11:30  Telephone Visit End Time (telephone visit stop time): 11:48    How would patient like to obtain AVS? MyChart     Diabetes Education Follow-up    Subjective/Objective:    Review of goals and BG log with Be Alexis. Last date of communication was: 2/15/23.    Diabetes is being managed with   Lifestyle (diet/activity), Diabetes Medications   Diabetes Medication(s)     Biguanides       metFORMIN (GLUCOPHAGE) 500 MG tablet    TAKE 2 TABLETS( 1000 MG) WITH BREAKFAST AND TWO TABLETS( 1000 MG) WITH DINNER.          BG Log:   FBS:123,150,140,140,140,152,150,131  PP: 188,190,167    Assessment:    Be has increased her Metformin dose to 2000 mg/day.  She has been checking fasting daily and PP 3-4x week. Overall BG readings have decreased in this past month.  Be has increased her exercise time to 35-45 minutes, 3x/week and she will be increasing the incline on her treadmill this coming week for a more intense workout. She continues to focus on healthy eating changes. Be will have an eye exam scheduled for later in 2023, and next dental cleaning will be June 2023.  Her stress level has been increased due to upcoming wedding plans for 4/29.      Plan/Response:  See Patient Instructions for co-developed, patient-stated behavior change goals.  1. Schedule lab visit for after 4/18/23 to recheck A1C.  2. Continue to check blood sugars twice daily: fasting and two hours after a meal.  3. Take medications as prescribed.  4. Exercise a minimum of  3x/week for 35+ minutes, add in intensity carefully on the treadmill by increasing incline.  5. Follow up with educator on 5/9/23        Any diabetes medication dose changes were made via the CDE Protocol and Collaborative Practice Agreement with the patient's primary care provider. A copy of this encounter was shared with the provider.

## 2023-03-17 NOTE — PROGRESS NOTES
Type of Service: Telephone Visit/ 18 minutes    Originating Location (Patient Location): Home  Distant Location (Provider Location): [facility]  Mode of Communication:  Telephone    Telephone Visit Start Time: 11:30  Telephone Visit End Time (telephone visit stop time): 11:48    [facility]     Diabetes Education Follow-up    Subjective/Objective:    Review of goals and BG log with Be Alexis. Last date of communication was: 2/15/23.    Diabetes is being managed with   Lifestyle (diet/activity), Diabetes Medications   Diabetes Medication(s)     Biguanides       metFORMIN (GLUCOPHAGE) 500 MG tablet    TAKE 2 TABLETS( 1000 MG) WITH BREAKFAST AND TWO TABLETS( 1000 MG) WITH DINNER.          BG Log:   FBS:123,150,140,140,140,152,150,131  PP: 188,190,167    Assessment:    Be has increased her Metformin dose to 2000 mg/day.  She has been checking fasting daily and PP 3-4x week. Overall BG readings have decreased in this past month.  Be has increased her exercise time to 35-45 minutes, 3x/week and she will be increasing the incline on her treadmill this coming week for a more intense workout. She continues to focus on healthy eating changes. Be will have an eye exam scheduled for later in 2023, and next dental cleaning will be June 2023.  Her stress level has been increased due to upcoming wedding plans for 4/29.      Plan/Response:  See Patient Instructions for co-developed, patient-stated behavior change goals.  1. Schedule lab visit for after 4/18/23 to recheck A1C.  2. Continue to check blood sugars twice daily: fasting and two hours after a meal.  3. Take medications as prescribed.  4. Exercise a minimum of 3x/week for 35+ minutes, add in intensity carefully on the treadmill by increasing incline.  5. Follow up with educator on 5/9/23        Any diabetes medication dose changes were made via the CDE Protocol and Collaborative Practice Agreement with  the patient's primary care provider. A copy of this encounter was shared with the provider.

## 2023-03-23 ENCOUNTER — TELEPHONE (OUTPATIENT)
Dept: FAMILY MEDICINE | Facility: CLINIC | Age: 43
End: 2023-03-23
Payer: COMMERCIAL

## 2023-03-23 NOTE — TELEPHONE ENCOUNTER
Pt called in to request to speak with Guadalupe regarding her medication metFORMIN that she states she is unable to get. Please give her a call back at your earliest convenient.  Thanks!

## 2023-04-05 ENCOUNTER — TRANSFERRED RECORDS (OUTPATIENT)
Dept: HEALTH INFORMATION MANAGEMENT | Facility: CLINIC | Age: 43
End: 2023-04-05
Payer: COMMERCIAL

## 2023-04-05 ENCOUNTER — TRANSCRIBE ORDERS (OUTPATIENT)
Dept: MATERNAL FETAL MEDICINE | Facility: CLINIC | Age: 43
End: 2023-04-05
Payer: COMMERCIAL

## 2023-04-05 ENCOUNTER — MEDICAL CORRESPONDENCE (OUTPATIENT)
Dept: HEALTH INFORMATION MANAGEMENT | Facility: CLINIC | Age: 43
End: 2023-04-05
Payer: COMMERCIAL

## 2023-04-05 DIAGNOSIS — Z31.69 ENCOUNTER FOR PRECONCEPTION CONSULTATION: Primary | ICD-10-CM

## 2023-04-13 DIAGNOSIS — I50.20 HEART FAILURE WITH REDUCED EJECTION FRACTION, NYHA CLASS II (H): ICD-10-CM

## 2023-04-13 DIAGNOSIS — E03.9 HYPOTHYROIDISM, UNSPECIFIED TYPE: Primary | ICD-10-CM

## 2023-04-13 DIAGNOSIS — L40.8 OTHER PSORIASIS: ICD-10-CM

## 2023-04-13 DIAGNOSIS — E11.65 UNCONTROLLED TYPE 2 DIABETES MELLITUS WITH HYPERGLYCEMIA (H): ICD-10-CM

## 2023-04-13 DIAGNOSIS — I50.22 CHRONIC SYSTOLIC HEART FAILURE (H): ICD-10-CM

## 2023-06-01 ENCOUNTER — TRANSFERRED RECORDS (OUTPATIENT)
Dept: MULTI SPECIALTY CLINIC | Facility: CLINIC | Age: 43
End: 2023-06-01

## 2023-06-01 ENCOUNTER — HEALTH MAINTENANCE LETTER (OUTPATIENT)
Age: 43
End: 2023-06-01

## 2023-06-01 LAB
RETINOPATHY: NORMAL
RETINOPATHY: NORMAL

## 2023-06-09 ENCOUNTER — VIRTUAL VISIT (OUTPATIENT)
Dept: EDUCATION SERVICES | Facility: CLINIC | Age: 43
End: 2023-06-09
Payer: COMMERCIAL

## 2023-06-09 DIAGNOSIS — E11.65 UNCONTROLLED TYPE 2 DIABETES MELLITUS WITH HYPERGLYCEMIA (H): Primary | ICD-10-CM

## 2023-06-09 PROCEDURE — 98968 PH1 ASSMT&MGMT NQHP 21-30: CPT | Mod: 93 | Performed by: DIETITIAN, REGISTERED

## 2023-06-09 NOTE — PROGRESS NOTES
Type of Service: Telephone Visit/ 22 minutes  Originating Location (Patient Location): Home  Distant Location (Provider Location): [facility]  Mode of Communication:  Telephone    Telephone Visit Start Time: 1:58  Telephone Visit End Time (telephone visit stop time): 2:20    How would patient like to obtain AVS? MyChart     Diabetes Education Follow-up    Subjective/Objective:    Follow up on goals and BG log with Be Alexis. Last date of communication was: 3/17/23.    Diabetes is being managed with   Lifestyle (diet/activity), Diabetes Medications   Diabetes Medication(s)     Biguanides       metFORMIN (GLUCOPHAGE) 500 MG tablet    TAKE 2 TABLETS( 1000 MG) WITH BREAKFAST AND TWO TABLETS( 1000 MG) WITH DINNER.          BG/Food Log:   FBS: 125, 126, 125, 132, 135, 138, 115  PP: 95, 135, 188    Assessment:    Be is exercising 3x/week for 40-50 minutes, she is mainly eating chicken breast with vegetables and small amounts of rice. She finds that rice significantly increases her BG. She is taking Metformin 500 mg tablets, prescribed for 4 tablets/day, at times she is not taking the full dose, because if she does not have a meal, she will not take the Metformin.  We will switch her to ER metformin so she can take all four tablets at the same time, per PCP discretion.  She is struggling with not being able to lose weight.  We discussed GLP1 medications for Diabetes and weight loss but also that they cannot be used if she is trying to get pregnant.  Be is scheduled for dental cleaning in July and she will have an eye exam at the end of the year.       Plan/Response:  See Patient Instructions for co-developed, patient-stated behavior change goals.  1. Test blood sugar twice daily: in the morning before eating and two hours after one of your meals.  2. Continue to exercise a minimum of 3x/week for 40-50 minutes.   3. Take medications as prescribed. When you switch to the extended release  Metformin, you will be able to take all of the tablets at one time.     Educator will follow up with patient on 6/14 regarding A1C recheck.         Any diabetes medication dose changes were made via the CDE Protocol and Collaborative Practice Agreement with the patient's primary care provider. A copy of this encounter was shared with the provider.

## 2023-06-09 NOTE — LETTER
6/9/2023         RE: Be Alexis  [address]  [facility] [address]        Dear Colleague,    Thank you for referring your patient, Be Alexis, to the [facility]. Please see a copy of my visit note below.    Type of Service: Telephone Visit/ 22 minutes  Originating Location (Patient Location): Home  Distant Location (Provider Location): [facility]  Mode of Communication:  Telephone    Telephone Visit Start Time: 1:58  Telephone Visit End Time (telephone visit stop time): 2:20    How would patient like to obtain AVS? MyChart     Diabetes Education Follow-up    Subjective/Objective:    Follow up on goals and BG log with Be Alexis. Last date of communication was: 3/17/23.    Diabetes is being managed with   Lifestyle (diet/activity), Diabetes Medications   Diabetes Medication(s)     Biguanides       metFORMIN (GLUCOPHAGE) 500 MG tablet    TAKE 2 TABLETS( 1000 MG) WITH BREAKFAST AND TWO TABLETS( 1000 MG) WITH DINNER.          BG/Food Log:   FBS: 125, 126, 125, 132, 135, 138, 115  PP: 95, 135, 188    Assessment:    Be is exercising 3x/week for 40-50 minutes, she is mainly eating chicken breast with vegetables and small amounts of rice. She finds that rice significantly increases her BG. She is taking Metformin 500 mg tablets, prescribed for 4 tablets/day, at times she is not taking the full dose, because if she does not have a meal, she will not take the Metformin.  We will switch her to ER metformin so she can take all four tablets at the same time, per PCP discretion.  She is struggling with not being able to lose weight.  We discussed GLP1 medications for Diabetes and weight loss but also that they cannot be used if she is trying to get pregnant.  Be is scheduled for dental cleaning in July and she will have an eye exam at the end of the year.       Plan/Response:  See Patient Instructions for co-developed, patient-stated behavior change  goals.  1. Test blood sugar twice daily: in the morning before eating and two hours after one of your meals.  2. Continue to exercise a minimum of 3x/week for 40-50 minutes.   3. Take medications as prescribed. When you switch to the extended release Metformin, you will be able to take all of the tablets at one time.     Educator will follow up with patient on 6/14 regarding A1C recheck.         Any diabetes medication dose changes were made via the CDE Protocol and Collaborative Practice Agreement with the patient's primary care provider. A copy of this encounter was shared with the provider.

## 2023-06-09 NOTE — PATIENT INSTRUCTIONS
Test blood sugar twice daily: in the morning before eating and two hours after one of your meals.  Continue to exercise a minimum of 3x/week for 40-50 minutes.   Take medications as prescribed. When you switch to the extended release Metformin, you will be able to take all of the tablets at one time.

## 2023-06-13 ENCOUNTER — OFFICE VISIT (OUTPATIENT)
Dept: CARDIOLOGY | Facility: CLINIC | Age: 43
End: 2023-06-13
Attending: GENERAL ACUTE CARE HOSPITAL
Payer: COMMERCIAL

## 2023-06-13 ENCOUNTER — LAB (OUTPATIENT)
Dept: LAB | Facility: CLINIC | Age: 43
End: 2023-06-13
Payer: COMMERCIAL

## 2023-06-13 VITALS
OXYGEN SATURATION: 98 % | BODY MASS INDEX: 27.87 KG/M2 | RESPIRATION RATE: 16 BRPM | SYSTOLIC BLOOD PRESSURE: 112 MMHG | WEIGHT: 138 LBS | HEART RATE: 106 BPM | DIASTOLIC BLOOD PRESSURE: 80 MMHG

## 2023-06-13 DIAGNOSIS — E11.65 TYPE 2 DIABETES MELLITUS WITH HYPERGLYCEMIA, WITHOUT LONG-TERM CURRENT USE OF INSULIN (H): ICD-10-CM

## 2023-06-13 DIAGNOSIS — I42.8 NONISCHEMIC CARDIOMYOPATHY (H): ICD-10-CM

## 2023-06-13 DIAGNOSIS — I10 ESSENTIAL HYPERTENSION: ICD-10-CM

## 2023-06-13 DIAGNOSIS — E11.65 UNCONTROLLED TYPE 2 DIABETES MELLITUS WITH HYPERGLYCEMIA (H): ICD-10-CM

## 2023-06-13 DIAGNOSIS — E78.5 HYPERLIPIDEMIA, UNSPECIFIED HYPERLIPIDEMIA TYPE: Primary | ICD-10-CM

## 2023-06-13 DIAGNOSIS — I50.22 CHRONIC SYSTOLIC HEART FAILURE (H): ICD-10-CM

## 2023-06-13 LAB — HBA1C MFR BLD: 7.4 % (ref 0–5.6)

## 2023-06-13 PROCEDURE — 99214 OFFICE O/P EST MOD 30 MIN: CPT | Performed by: GENERAL ACUTE CARE HOSPITAL

## 2023-06-13 PROCEDURE — 83036 HEMOGLOBIN GLYCOSYLATED A1C: CPT

## 2023-06-13 PROCEDURE — 36415 COLL VENOUS BLD VENIPUNCTURE: CPT

## 2023-06-13 NOTE — PROGRESS NOTES
HEART CARE ENCOUNTER NOTE        Assessment/Recommendations   Assessment:    1. Chronic congestive heart failure with reduced left ventricular ejection fraction since improved to 45% due to nonischemic cardiomyopathy. Unclear cause, but hypertensive heart disease, viral myocarditis, and uncontrolled thyroid disease all are possibilities. NYHA class I, euvolemic with a recent normal brain natriuretic peptide level.  2. Essential hypertension. Controlled.  3. Hyperlipidemia.   4. Non insulin-dependent diabetes mellitus type 2. Hemoglobin A1c 13.3%.  5. Hypothyroidism.  6. BMI 27.87.    Plan:  1. From a cardiac standpoint, she may proceed with plans for in vitro fertilization and pregnancy.  2. She should be on a statin eventually, but as these are typically contraindicated in pregnancy, I would wait until her reproductive plans are completed.  3. As she is planning on becoming pregnant we will continue metoprolol succinate 50 mg daily and hydralazine 25 mg two times a day and isosorbide dinitrate 10 mg two times a day instead of her prior regimen of carvedilol and losartan.  4. Furosemide as needed.  5. Follow-up in 1 year.         History of Present Illness   Ms. Be Alexis is a 43 year old female with a significant past history of HFrEF with LVEF improved to 45% due to NICM, HTN, and hypothyroidism presenting for routine follow-up.    She was recently diagnosed with diabetes type 2 and has since started metformin, which is causing some GI upset. She has lost 10-11 pounds with lifestyle changes. She is being seen in a fertility clinic and hoping to start IVF soon.    She feels great. No chest pain/pressure/tightness, shortness of breath at rest or with exertion, light headedness/dizziness, pre-syncope, syncope, lower extremity swelling, palpitations, paroxysmal nocturnal dyspnea (PND), or orthopnea. She has not needed to use her furosemide.     Cardiac Problems and Cardiac Diagnostics     Most Recent Cardiac  testing:  ECG dated 12/17/2020 (personaly reviewed and interpreted): SR, nonspecific T wave abnormality, QTc 489 ms     ECHO 4/8/2021 (report reviewed):     Left ventricle ejection fraction is mildly decreased. The calculated left ventricular ejection fraction is 45%.    Normal right ventricular size. TAPSE is abnormal, which is consistent with abnormal right ventricular systolic function.    No significant valvular abnormalities.    When compared to the previous study dated 12/15/2020, LVEF is higher.     Stress test 12/17/2020 (report reviewed):   1. Lexiscan stress cardiac MRI is negative for inducible myocardial ischemia.  2. Left ventricular cavity size is mildly dilated. There is moderate concentric hypertrophy. Systolic function is severely  reduced with global hypokinesis. The calculated left ventricular ejection fraction is 21%.  3. Right ventricular cavity size is normal with with low normal right ventricular systolic function. The calculated right  ventricular ejection fraction is 45%.  4. No evidence of prior infarction or other focal myocardial scarring or fibrosis.  5. No significant valvular abnormalities.  6. Small circumferential pericardial effusion.     Medications  Allergies   Current Outpatient Medications   Medication Sig Dispense Refill     ACCU-CHEK GUIDE test strip Use to test blood sugar twice daily. 100 strip 6     blood glucose monitoring (SOFTCLIX) lancets Use to test blood sugar twice daily. 100 each 4     Blood Glucose Monitoring Suppl (ACCU-CHEK GUIDE ME) w/Device KIT 1 each as needed 1 kit 0     hydrALAZINE (APRESOLINE) 25 MG tablet TAKE 1 TABLET(25 MG) BY MOUTH TWICE DAILY 180 tablet 2     isosorbide dinitrate (ISORDIL) 10 MG tablet Take 1 tablet (10 mg) by mouth 2 times daily 180 tablet 3     levothyroxine (SYNTHROID/LEVOTHROID) 75 MCG tablet Take 1 tablet (75 mcg) by mouth daily 90 tablet 3     metFORMIN (GLUCOPHAGE) 500 MG tablet TAKE 2 TABLETS( 1000 MG) WITH BREAKFAST AND TWO  TABLETS( 1000 MG) WITH DINNER. 360 tablet 1     metoprolol succinate ER (TOPROL XL) 25 MG 24 hr tablet Take 1 tablet (25 mg) by mouth daily 90 tablet 3     multivitamin with minerals (THERA-M) 9 mg iron-400 mcg Tab tablet [MULTIVITAMIN WITH MINERALS (THERA-M) 9 MG IRON-400 MCG TAB TABLET] Take 1 tablet by mouth daily.  0     triamcinolone (KENALOG) 0.5 % external ointment Apply to affected areas daily, as needed. 30 g 1     furosemide (LASIX) 20 MG tablet Take 20 mg by mouth daily as needed (Patient not taking: Reported on 6/13/2023)        Allergies   Allergen Reactions     Lisinopril Cough        Physical Examination Review of Systems   /80 (BP Location: Left arm, Patient Position: Sitting, Cuff Size: Adult Regular)   Pulse 106   Resp 16   Wt 62.6 kg (138 lb)   SpO2 98%   BMI 27.87 kg/m    Body mass index is 27.87 kg/m .  Wt Readings from Last 3 Encounters:   06/13/23 62.6 kg (138 lb)   01/17/23 65.4 kg (144 lb 4 oz)   06/15/22 67.6 kg (149 lb)       General Appearance:   Pleasant  female, appears  stated age. no acute distress, normal body habitus   ENT/Mouth: membranes moist, no apparent gingival bleeding.      EYES:  no scleral icterus, normal conjunctivae   Neck: no carotid bruits. No anterior cervical lymphadenopaty   Respiratory:   lungs are clear to auscultation, no rales or wheezing, equal chest wall expansion    Cardiovascular:   Regular rhythm, normal rate. Normal first and second heart sounds with no murmurs, rubs, or gallops; the carotid, radial and posterior tibial pulses are intact, Jugular venous pressure normal, no edema bilaterally    Abdomen/GI:  no organomegaly, masses, bruits, or tenderness; bowel sounds are present   Extremities: no cyanosis or clubbing   Skin: no xanthelasma, warm.    Heme/lymph/ Immunology No apparent bleeding noted.   Neurologic: Alert and oriented. normal gait, no tremors     Psychiatric: Pleasant, calm, appropriate affect.    A complete 10 system review of  systems was performed and is negative except as mentioned in the HPI/subjective.         Past History   Past Medical History:   Past Medical History:   Diagnosis Date     Anemia     severe, Hbg 7.2 in 1/2016     CHF (congestive heart failure) (H)      Endometrial disorder 01/01/2016    pelvic u/s showed heterogenous stripe; getting it biopsied in 2/2016     HLD (hyperlipidemia)      Hypothyroid     as of 1/2016 bumped up to 50mcg per day     Iron deficiency anemia 04/06/2017     Leiomyoma of uterus 04/13/2017     Overweight      Psoriasis      Type 2 diabetes mellitus (H)        Past Surgical History:   Past Surgical History:   Procedure Laterality Date     HYSTEROSCOPY, ABLATE ENDOMETRIUM HYDROTHERMAL, COMBINED N/A 4/13/2017    Procedure: HYSTEROSCOPY DILATION AND CURETTAGE, MYOMECTOMY;  Surgeon: Alyse Barnett MD;  Location: [facility];  Service: Gynecology       Family History:   Family History   Problem Relation Age of Onset     No Known Problems Mother      No Known Problems Father         Social History:   Social History     Socioeconomic History     Marital status:      Spouse name: Not on file     Number of children: Not on file     Years of education: Not on file     Highest education level: Not on file   Occupational History     Not on file   Tobacco Use     Smoking status: Never     Smokeless tobacco: Never   Vaping Use     Vaping status: Not on file   Substance and Sexual Activity     Alcohol use: No     Drug use: No     Sexual activity: Yes     Partners: Male     Birth control/protection: OCP   Other Topics Concern     Not on file   Social History Narrative     Not on file     Social Determinants of Health     Financial Resource Strain: Not on file   Food Insecurity: Not on file   Transportation Needs: Not on file   Physical Activity: Not on file   Stress: Not on file   Social Connections: Not on file   Intimate Partner Violence: Not on file   Housing Stability: Not on file               Lab Results    Chemistry/lipid CBC Cardiac Enzymes/BNP/TSH/INR   Lab Results   Component Value Date    CHOL 221 (H) 01/17/2023    HDL 30 (L) 01/17/2023    LDL  01/17/2023      Comment:      Cannot estimate LDL when triglyceride exceeds 400 mg/dL    TRIG 831 (H) 01/17/2023    CR 0.87 01/17/2023    BUN 12.3 01/17/2023    POTASSIUM 3.9 01/17/2023     (L) 01/17/2023    CO2 18 (L) 01/17/2023      Lab Results   Component Value Date    WBC 9.4 01/17/2023    HGB 13.8 01/17/2023    HCT 41.9 01/17/2023    MCV 88 01/17/2023     01/17/2023    A1C 13.3 (H) 01/17/2023     Lab Results   Component Value Date    A1C 13.3 (H) 01/17/2023    Lab Results   Component Value Date    TROPONINI 0.01 12/16/2020    BNP 30 01/07/2021    NTBNP 13 01/17/2023    TSH 18.10 (H) 01/17/2023    INR 0.96 12/15/2020          Hai Ndiaye MD [facility]  Non-Invasive Cardiologist  [facility]  Pager [phone]

## 2023-06-13 NOTE — LETTER
6/13/2023    Charisse Padgett PA-C  [address] [facility] [address]    RE: Be Alexis       Dear Colleague,     I had the [facility] of seeing Be Alexis in [facility].  HEART CARE ENCOUNTER NOTE        Assessment/Recommendations   Assessment:    Chronic congestive heart failure with reduced left ventricular ejection fraction since improved to 45% due to nonischemic cardiomyopathy. Unclear cause, but hypertensive heart disease, viral myocarditis, and uncontrolled thyroid disease all are possibilities. NYHA class I, euvolemic with a recent normal brain natriuretic peptide level.  Essential hypertension. Controlled.  Hyperlipidemia.   Non insulin-dependent diabetes mellitus type 2. Hemoglobin A1c 13.3%.  Hypothyroidism.  BMI 27.87.    Plan:  From a cardiac standpoint, she may proceed with plans for in vitro fertilization and pregnancy.  She should be on a statin eventually, but as these are typically contraindicated in pregnancy, I would wait until her reproductive plans are completed.  As she is planning on becoming pregnant we will continue metoprolol succinate 50 mg daily and hydralazine 25 mg two times a day and isosorbide dinitrate 10 mg two times a day instead of her prior regimen of carvedilol and losartan.  Furosemide as needed.  Follow-up in 1 year.         History of Present Illness   Ms. Be Alexis is a 43 year old female with a significant past history of HFrEF with LVEF improved to 45% due to NICM, HTN, and hypothyroidism presenting for routine follow-up.    She was recently diagnosed with diabetes type 2 and has since started metformin, which is causing some GI upset. She has lost 10-11 pounds with lifestyle changes. She is being seen in a fertility clinic and hoping to start IVF soon.    She feels great. No chest pain/pressure/tightness, shortness of breath at rest or with exertion, light headedness/dizziness, pre-syncope, syncope, lower extremity swelling, palpitations,  paroxysmal nocturnal dyspnea (PND), or orthopnea. She has not needed to use her furosemide.     Cardiac Problems and Cardiac Diagnostics     Most Recent Cardiac testing:  ECG dated 12/17/2020 (personaly reviewed and interpreted): SR, nonspecific T wave abnormality, QTc 489 ms     ECHO 4/8/2021 (report reviewed):     Left ventricle ejection fraction is mildly decreased. The calculated left ventricular ejection fraction is 45%.    Normal right ventricular size. TAPSE is abnormal, which is consistent with abnormal right ventricular systolic function.    No significant valvular abnormalities.    When compared to the previous study dated 12/15/2020, LVEF is higher.     Stress test 12/17/2020 (report reviewed):   1. Lexiscan stress cardiac MRI is negative for inducible myocardial ischemia.  2. Left ventricular cavity size is mildly dilated. There is moderate concentric hypertrophy. Systolic function is severely  reduced with global hypokinesis. The calculated left ventricular ejection fraction is 21%.  3. Right ventricular cavity size is normal with with low normal right ventricular systolic function. The calculated right  ventricular ejection fraction is 45%.  4. No evidence of prior infarction or other focal myocardial scarring or fibrosis.  5. No significant valvular abnormalities.  6. Small circumferential pericardial effusion.     Medications  Allergies   Current Outpatient Medications   Medication Sig Dispense Refill    ACCU-CHEK GUIDE test strip Use to test blood sugar twice daily. 100 strip 6    blood glucose monitoring (SOFTCLIX) lancets Use to test blood sugar twice daily. 100 each 4    Blood Glucose Monitoring Suppl (ACCU-CHEK GUIDE ME) w/Device KIT 1 each as needed 1 kit 0    hydrALAZINE (APRESOLINE) 25 MG tablet TAKE 1 TABLET(25 MG) BY MOUTH TWICE DAILY 180 tablet 2    isosorbide dinitrate (ISORDIL) 10 MG tablet Take 1 tablet (10 mg) by mouth 2 times daily 180 tablet 3    levothyroxine (SYNTHROID/LEVOTHROID)  75 MCG tablet Take 1 tablet (75 mcg) by mouth daily 90 tablet 3    metFORMIN (GLUCOPHAGE) 500 MG tablet TAKE 2 TABLETS( 1000 MG) WITH BREAKFAST AND TWO TABLETS( 1000 MG) WITH DINNER. 360 tablet 1    metoprolol succinate ER (TOPROL XL) 25 MG 24 hr tablet Take 1 tablet (25 mg) by mouth daily 90 tablet 3    multivitamin with minerals (THERA-M) 9 mg iron-400 mcg Tab tablet [MULTIVITAMIN WITH MINERALS (THERA-M) 9 MG IRON-400 MCG TAB TABLET] Take 1 tablet by mouth daily.  0    triamcinolone (KENALOG) 0.5 % external ointment Apply to affected areas daily, as needed. 30 g 1    furosemide (LASIX) 20 MG tablet Take 20 mg by mouth daily as needed (Patient not taking: Reported on 6/13/2023)        Allergies   Allergen Reactions    Lisinopril Cough        Physical Examination Review of Systems   /80 (BP Location: Left arm, Patient Position: Sitting, Cuff Size: Adult Regular)   Pulse 106   Resp 16   Wt 62.6 kg (138 lb)   SpO2 98%   BMI 27.87 kg/m    Body mass index is 27.87 kg/m .  Wt Readings from Last 3 Encounters:   06/13/23 62.6 kg (138 lb)   01/17/23 65.4 kg (144 lb 4 oz)   06/15/22 67.6 kg (149 lb)       General Appearance:   Pleasant  female, appears  stated age. no acute distress, normal body habitus   ENT/Mouth: membranes moist, no apparent gingival bleeding.      EYES:  no scleral icterus, normal conjunctivae   Neck: no carotid bruits. No anterior cervical lymphadenopaty   Respiratory:   lungs are clear to auscultation, no rales or wheezing, equal chest wall expansion    Cardiovascular:   Regular rhythm, normal rate. Normal first and second heart sounds with no murmurs, rubs, or gallops; the carotid, radial and posterior tibial pulses are intact, Jugular venous pressure normal, no edema bilaterally    Abdomen/GI:  no organomegaly, masses, bruits, or tenderness; bowel sounds are present   Extremities: no cyanosis or clubbing   Skin: no xanthelasma, warm.    Heme/lymph/ Immunology No apparent bleeding noted.    Neurologic: Alert and oriented. normal gait, no tremors     Psychiatric: Pleasant, calm, appropriate affect.    A complete 10 system review of systems was performed and is negative except as mentioned in the HPI/subjective.         Past History   Past Medical History:   Past Medical History:   Diagnosis Date    Anemia     severe, Hbg 7.2 in 1/2016    CHF (congestive heart failure) (H)     Endometrial disorder 01/01/2016    pelvic u/s showed heterogenous stripe; getting it biopsied in 2/2016    HLD (hyperlipidemia)     Hypothyroid     as of 1/2016 bumped up to 50mcg per day    Iron deficiency anemia 04/06/2017    Leiomyoma of uterus 04/13/2017    Overweight     Psoriasis     Type 2 diabetes mellitus (H)        Past Surgical History:   Past Surgical History:   Procedure Laterality Date    HYSTEROSCOPY, ABLATE ENDOMETRIUM HYDROTHERMAL, COMBINED N/A 4/13/2017    Procedure: HYSTEROSCOPY DILATION AND CURETTAGE, MYOMECTOMY;  Surgeon: Alyse Barnett MD;  Location: [facility];  Service: Gynecology       Family History:   Family History   Problem Relation Age of Onset    No Known Problems Mother     No Known Problems Father         Social History:   Social History     Socioeconomic History    Marital status:      Spouse name: Not on file    Number of children: Not on file    Years of education: Not on file    Highest education level: Not on file   Occupational History    Not on file   Tobacco Use    Smoking status: Never    Smokeless tobacco: Never   Vaping Use    Vaping status: Not on file   Substance and Sexual Activity    Alcohol use: No    Drug use: No    Sexual activity: Yes     Partners: Male     Birth control/protection: OCP   Other Topics Concern    Not on file   Social History Narrative    Not on file     Social Determinants of Health     Financial Resource Strain: Not on file   Food Insecurity: Not on file   Transportation Needs: Not on file   Physical Activity: Not on file   Stress: Not on  file   Social Connections: Not on file   Intimate Partner Violence: Not on file   Housing Stability: Not on file              Lab Results    Chemistry/lipid CBC Cardiac Enzymes/BNP/TSH/INR   Lab Results   Component Value Date    CHOL 221 (H) 01/17/2023    HDL 30 (L) 01/17/2023    LDL  01/17/2023      Comment:      Cannot estimate LDL when triglyceride exceeds 400 mg/dL    TRIG 831 (H) 01/17/2023    CR 0.87 01/17/2023    BUN 12.3 01/17/2023    POTASSIUM 3.9 01/17/2023     (L) 01/17/2023    CO2 18 (L) 01/17/2023      Lab Results   Component Value Date    WBC 9.4 01/17/2023    HGB 13.8 01/17/2023    HCT 41.9 01/17/2023    MCV 88 01/17/2023     01/17/2023    A1C 13.3 (H) 01/17/2023     Lab Results   Component Value Date    A1C 13.3 (H) 01/17/2023    Lab Results   Component Value Date    TROPONINI 0.01 12/16/2020    BNP 30 01/07/2021    NTBNP 13 01/17/2023    TSH 18.10 (H) 01/17/2023    INR 0.96 12/15/2020          Hai Ndiaye MD [facility]  Non-Invasive Cardiologist  [facility]  Pager [phone]        Thank you for allowing me to participate in the care of your patient.      Sincerely,     Hai Ndiaye MD     [facility]  cc:   Hai Ndiaye MD  [address] [facility]  [place] [address]

## 2023-06-13 NOTE — PATIENT INSTRUCTIONS
"Stay on the current medications.  Once you have completed IVF and pregnancy, I would have you take a medication for cholesterol such as \"rosuvastatin\".  See me back in 1 year.  "

## 2023-06-14 RX ORDER — METFORMIN HCL 500 MG
TABLET, EXTENDED RELEASE 24 HR ORAL
Qty: 360 TABLET | Refills: 1 | Status: SHIPPED | OUTPATIENT
Start: 2023-06-14 | End: 2023-09-19

## 2023-07-27 DIAGNOSIS — I50.22 CHRONIC SYSTOLIC HEART FAILURE (H): ICD-10-CM

## 2023-07-27 RX ORDER — ISOSORBIDE DINITRATE 10 MG/1
TABLET ORAL
Qty: 180 TABLET | Refills: 3 | Status: SHIPPED | OUTPATIENT
Start: 2023-07-27 | End: 2023-10-12

## 2023-07-28 ENCOUNTER — PRE VISIT (OUTPATIENT)
Dept: MATERNAL FETAL MEDICINE | Facility: CLINIC | Age: 43
End: 2023-07-28
Payer: COMMERCIAL

## 2023-08-02 NOTE — PROGRESS NOTES
Maternal-Fetal Medicine Consultation    Be Alexis  : 1980  MRN: [id number]    REFERRAL:  Be Alexis is a 43 year old sent by Dr. Hughes from [facility] for MFM consultation.    HPI:  Be Alexis is a 43 year old  here for preconception counseling due to history of CHF, hypothyroidism, and type 2 DM.     Patient is here with her partner. She has been seen by [facility] for infertility, and she plans to undergo IVF. She is undecided whether she will use her own eggs or donor eggs. She has not yet undergone laboratory/ultrasound assessment at [facility]. She states she wanted to establish a plan with her cardiologist and MFM first. She is also planning her wedding ceremony next month, and she reports this has been a significant stressor that has impacted the timing of her evaluations.     Patient has a history of HFrEF due to NICM of unclear etiology, though thought to have been partially caused by hypothyroidism. She was last seen by cardiology in 2022. On her most recent echocardiogram in 2021, her LVEF had improved to 45% (previously 18%). She is asymptomatic from a heart failure standpoint. She currently takes metoprolol succinate 25 mg daily, hydralazine 25 mg twice daily, and isosorbide dinitrate 10 mg twice daily. She states she was recently switched from carvedilol to metoprolol in preparation for pregnancy. She reports increased headaches since then, which she attributes to the metoprolol.    Regarding her diabetes, patient reports she has been working with a diabetes educator to improve her diet. She has made a lot of changes, and has had recent improvement of her A1c from 13.3% (2023) to 7.4% (2023). She is currently taking metformin.     Patient is currently taking levothyroxine 75 mcg daily for her hypothyroidism. Her last TSH was 18.1 (2023). She has not had any adjustments to her medications since then. She takes her levothyroxine at least a few hours before her prenatal vitamin.    Obstetrics  History:  OB History    Para Term  AB Living   0 0 0 0 0 0   SAB IAB Ectopic Multiple Live Births   0 0 0 0 0     Gynecologic History:  - Menstrual history: Irregular cycles   - Last Pap: NIL HPV neg (3/28/2013)  - Denies any history of abnormal pap smears  - Denies prior cervical surgery or procedures  - Denies any history of frequent UTIs, vaginal infections, or STIs    Past Medical History:  Past Medical History:   Diagnosis Date     Anemia     severe, Hbg 7.2 in 2016     CHF (congestive heart failure) (H)      Endometrial disorder 2016    pelvic u/s showed heterogenous stripe; getting it biopsied in 2016     HLD (hyperlipidemia)      Hypothyroid     as of 2016 bumped up to 50mcg per day     Iron deficiency anemia 2017     Leiomyoma of uterus 2017     Overweight      Psoriasis      Type 2 diabetes mellitus (H)      Past Surgical History:  Past Surgical History:   Procedure Laterality Date     HYSTEROSCOPY, ABLATE ENDOMETRIUM HYDROTHERMAL, COMBINED N/A 2017    Procedure: HYSTEROSCOPY DILATION AND CURETTAGE, MYOMECTOMY;  Surgeon: Alyse Barnett MD;  [place] [facility] [place]  Service: Gynecology     Current Medications:  Prior to Admission medications    Medication Sig Last Dose Taking? Auth Provider Long Term End Date   ACCU-CHEK GUIDE test strip Use to test blood sugar twice daily.   Charisse Padgett PA-C     blood glucose monitoring (SOFTCLIX) lancets Use to test blood sugar twice daily.   Charisse Padgett PA-C     Blood Glucose Monitoring Suppl (ACCU-CHEK GUIDE ME) w/Device KIT 1 each as needed   Charisse Padgett PA-C     furosemide (LASIX) 20 MG tablet Take 20 mg by mouth daily as needed  Patient not taking: Reported on 2023   Reported, Patient No    hydrALAZINE (APRESOLINE) 25 MG tablet TAKE 1 TABLET(25 MG) BY MOUTH TWICE DAILY   Hai Ndiaye MD Yes    isosorbide dinitrate (ISORDIL) 10 MG tablet TAKE 1 TABLET(10 MG) BY MOUTH TWICE  DAILY   Hai Ndiaye MD Yes    levothyroxine (SYNTHROID/LEVOTHROID) 75 MCG tablet Take 1 tablet (75 mcg) by mouth daily   Charisse Padgett PA-C Yes    metFORMIN (GLUCOPHAGE XR) 500 MG 24 hr tablet Take one tablet(500 mg) by mouth for 3-4 days, if tolerated increase to two tablets(1000 mg) for 3-4 days, if tolerated increase to three tablets(1500 mg) for 3-4 days, if tolerated increase to 4 tablets( 2000 mg).   Charisse Padgett PA-C Yes    metFORMIN (GLUCOPHAGE) 500 MG tablet TAKE 2 TABLETS( 1000 MG) WITH BREAKFAST AND TWO TABLETS( 1000 MG) WITH DINNER.   Charisse Padgett PA-C Yes    metoprolol succinate ER (TOPROL XL) 25 MG 24 hr tablet Take 1 tablet (25 mg) by mouth daily   Hai Ndiaye MD Yes    multivitamin with minerals (THERA-M) 9 mg iron-400 mcg Tab tablet [MULTIVITAMIN WITH MINERALS (THERA-M) 9 MG IRON-400 MCG TAB TABLET] Take 1 tablet by mouth daily.   Davida Morris MD     triamcinolone (KENALOG) 0.5 % external ointment Apply to affected areas daily, as needed.   Charisse Padgett PA-C       Allergies:  Lisinopril    Social History:   Denies use of alcohol, drugs or smoking.    Family History:  Denies history of genetic disorders, preeclampsia, thromboembolic disease, bleeding disorders    ROS:  10-point ROS negative except as in HPI     PHYSICAL EXAM:  Blood pressure (!) 161/110, pulse 91, resp. rate 18, SpO2 98 %.      ASSESSMENT/PLAN:  Be Alexis is a 43 year old  here for preconception counseling due to history of chronic systolic heart failure, hypothyroidism, and type 2 DM.     # Congestive heart failure  Keeping in mind the physiological cardiovascular changes in pregnancy, risk assessment in women with CHD should be performed using the modified WHO classification. After reviewing the patient's history, her most recent echocardiogram in 2021 demonstrated LVEF 45% (improved from LVEF 18% in 2020). Based on this, she would classified as mWHO Risk Class  II-III due to mild LV impairment (EF >45%). Patients in this class have a 11-19% maternal cardiac event rate. We discussed the risks associated with pregnancy, including risk of worsening heart failure or cardiac arrhythmias.  As it has been several years since her last echo, we will plan to reach out to Be's Cardiologist to have a repeat echocardiogram prior to proceeding with pregnancy and would also recommend assessment with an exercise stress test as well.  Be expressed understanding of the risks reviewed with her today regarding pregnancy and risk for cardiac event in pregnancy and would still plan to proceed with pregnancy.  We reviewed that while there is a measurable risk with pregnancy, pregnancy would not be contraindicated in her case, but would be a high risk pregnancy.  If there is worsening of her cardiac function or abnormal stress test, then I would recommend a repeat evaluation/discussion in [facility].  Recommend that Be call us once these are completed so we can review the results and determine if repeat consultation is needed.     # Hypothyroidism due to Hashimoto's thyroiditis  Thyroid requirements increase in pregnancy due to increases in metabolism and thyroid binding globulin. Thyroid function tests, in particular TSH, should be followed closely to ensure adequate treatment. Hypothyroidism has been associated with higher pregnancy complication rates including miscarriage, preeclampsia, abruption, low birth weight and stillbirth. Untreated hypothyroidism has also been associated with adverse fetal neurological development, but well treated hypothyroidism (ie euthyroid state) carries an excellent prognosis for mother and baby.   Given that Be's TSH was still markedly elevated at her last check, recommend continuing on her Synthroid and follow up with Endocrinology with goal TSH as noted below.    # Type 2 diabetes mellitus  We reviewed that pregnancy is characterized by insulin resistance.   Placental hormones increase in the second trimester and insulin resistance is greatest in the third trimester. This often manifests as worsening glycemic control. The overall goal of management of diabetes in pregnancy is maintenance of euglycemia which minimizes maternal and fetal risks.     We discussed that women with diabetes have significantly increased incidence of adverse pregnancy outcomes. Fetal/ complications in pregnant women with diabetes include spontaneous miscarriage, stillbirth, congenital malformations, fetal growth abnormalities (often macrosomia, but can have fetal growth restriction with long-standing diabetes and associated vascular disease),  respiratory distress, hypoglycemia, and hyperbilirubinemia. Obstetric complications in women with diabetes include increased incidence of preeclampsia,  term delivery and polyhydramnios. Associated with macrosomia is an increase in significant maternal lacerations and  delivery as well as wound complications. Polyhydramnios increases the risk of postpartum hemorrhage, as does macrosomia. Maternal considerations include increased difficulty in maintaining euglycemia, increased risk of DKA and more frequent hospitalization..      Women with hyperglycemia have an increased frequency of complications. The incidence of these outcomes is directly related to the hemoglobin A1c (HbA1c) level and recommend normalization of A1C prior to conception. Hyperglycemia during the period of fetal organogenesis increases the incidence of miscarriage and congenital anomalies, and ongoing hyperglycemia increases the risk of stillbirth. Cardiac defects are most commonly seen, as well as renal, central nervous system, spinal, and gastrointestinal anomalies. We discussed the importance of treatment throughout the pregnancy to prevent the above listed complications. In addition, concurrent maternal medical disorders are associated with long-standing  diabetes mellitus, especially: thyroid dysfunction, retinopathy, nephropathy, and vascular disease. To better understand the risks of undergoing pregnancy, a baseline assessment of each of these organs is recommended.  Recommend that Be switch to insulin now to ensure good glucose control on the regimen she would be using in pregnancy prior to conception.     # Chronic Hypertension  Be was noted to have elevated BP at our office today.  We reviewed that the concern with chronic hypertension is that such patients are more likely to develop preeclampsia during the pregnancy, with a risk of 20-50%.  Women with chronic hypertension are at increased risk for early-onset preeclampsia.  Low dose aspirin has been used to lower this risk.  Chronic hypertension also increases the risk of maternal stroke, pulmonary edema, renal failure, gestational diabetes, iatrogenic  birth, fetal growth restriction, placental abruption and  mortality rate including stillbirth    Without baseline laboratory assessment, it may be difficult to distinguish an exacerbation of hypertension from preeclampsia, especially in the third trimester. We reviewed that it is   generally anticipated that blood pressure will gradually decrease during early pregnancy, reaching at nahun at 28-32 weeks, and then slowly rise to pre-pregnancy levels.     We reviewed the goals of antihypertensive therapy in pregnancy, specifically there is new data to suggest that improved blood pressure control during pregnancy reduces the risk of developing superimposed preeclampsia with severe features, abruption,  delivery < 35 weeks, and fetal/ death without an increased risk of fetal growth restriction/small for gestation age infants. Based on these findings it is recommended blood pressures are maintained < 140/90 mmHg both pre pregnancy and throughout gestation. We generally recommend home blood pressure monitoring to assist with  medication titration as well as to monitor her for the development of preeclampsia.  In the event of new elevations in blood pressures after 20 weeks gestation, we would recommend thorough evaluation for preeclampsia prior to medication dose escalation.  I will plan to review this with Dr. Ndiaye as well and will defer management of her medications related to blood pressure control to Dr. Ndiaye, as Be is open to switching back to Carvedilol.     Preconception Recommendations:  - Obtain repeat EKG and echocardiogram for assessment of current cardiac status  - Exercise stress test  - Continue restarting carvedilol, as risk to fetus is low and maternal benefits may outweigh risk  - Continue cardiology follow up throughout pregnancy. Discussed with her current Cardiologist (Dr. Ndiaye) and she will follow up with him outside of pregnancy but would plan to be seen with [facility] for her OB care and would then follow with the [facility] Cardio-obstetrics team during pregnancy.   - If there is worsening of her cardiac function or abnormal stress test, then I would recommend a repeat evaluation/discussion in [facility].  Recommend that Be call us once these are completed so we can review the results and determine if repeat consultation is needed.   - Work with Dr. Ndiaye (Cardiology) to achieve blood pressure goal preconception to SBP < 140 and DBP <90.  - Dose of levothyroxine should be adjusted with a goal TSH level within normal limits, and she has been recommended to have TSH stabilized between the lower limit of the reference range and 2.5 milliunits/L prior to conception.   - Continued adjustment of levothyroxine as needed throughout pregnancy with a 25% increase recommended upon positive pregnancy test.   - As insulin is first-line therapy for diabetes in pregnancy, recommend switching from metformin to insulin preconception to ensure good glycemic control prior to conception with normalization of A1C prior to conception.   Be  has made great strides in this area.   - Continued follow up diabetes team/Endocrinology    The patient was seen and evaluated with Dr. Norwood.    Thank you for allowing us to participate in the care of your patient. Please do not hesitate to contact us if you have further questions regarding the management of your patient.     Tami Villa MD  OB/GYN PGY-2  08/03/2023 9:45 PM     Physician Attestation   I, Demario Norwood MD, saw this patient and agree with the findings and plan of care as documented in the note.      Items personally reviewed/procedural attestation: vitals, labs and imaging and agree with the interpretation documented in the note.    I spent a total of 80 minutes on the date of this encounter including preparing to see the patient (reviewing medical records/tests), in direct face-to-face contact with the patient during her visit with the majority  spent counseling and discussing the plan of care and documenting the visit in the electronic medical record.  Please see note for details.    Demario Norwood MD

## 2023-08-03 ENCOUNTER — OFFICE VISIT (OUTPATIENT)
Dept: MATERNAL FETAL MEDICINE | Facility: CLINIC | Age: 43
End: 2023-08-03
Attending: OBSTETRICS & GYNECOLOGY
Payer: COMMERCIAL

## 2023-08-03 VITALS
OXYGEN SATURATION: 98 % | SYSTOLIC BLOOD PRESSURE: 161 MMHG | HEART RATE: 91 BPM | DIASTOLIC BLOOD PRESSURE: 110 MMHG | RESPIRATION RATE: 18 BRPM

## 2023-08-03 DIAGNOSIS — E03.9 HYPOTHYROIDISM, UNSPECIFIED TYPE: ICD-10-CM

## 2023-08-03 DIAGNOSIS — I50.20 HEART FAILURE WITH REDUCED EJECTION FRACTION, NYHA CLASS II (H): ICD-10-CM

## 2023-08-03 DIAGNOSIS — E11.65 UNCONTROLLED TYPE 2 DIABETES MELLITUS WITH HYPERGLYCEMIA (H): ICD-10-CM

## 2023-08-03 DIAGNOSIS — Z31.5 ENCOUNTER FOR PROCREATIVE GENETIC COUNSELING: Primary | ICD-10-CM

## 2023-08-03 DIAGNOSIS — Z31.69 ENCOUNTER FOR PRECONCEPTION CONSULTATION: Primary | ICD-10-CM

## 2023-08-03 DIAGNOSIS — L40.8 OTHER PSORIASIS: ICD-10-CM

## 2023-08-03 DIAGNOSIS — I50.22 CHRONIC SYSTOLIC HEART FAILURE (H): ICD-10-CM

## 2023-08-03 PROCEDURE — 99205 OFFICE O/P NEW HI 60 MIN: CPT | Mod: GC | Performed by: OBSTETRICS & GYNECOLOGY

## 2023-08-03 PROCEDURE — G0463 HOSPITAL OUTPT CLINIC VISIT: HCPCS | Performed by: OBSTETRICS & GYNECOLOGY

## 2023-08-03 PROCEDURE — 96040 HC GENETIC COUNSELING, EACH 30 MINUTES: CPT | Performed by: GENETIC COUNSELOR, MS

## 2023-08-03 NOTE — PROGRESS NOTES
[facility]  Genetic Counseling Consult    Patient:  Be Alexis YOB: 1980   Date of Service:  23   MRN: [id number]    Be was seen at the [facility] for preconception genetic consultation. The indication for genetic counseling is advanced maternal age. The patient was accompanied to this visit by their .    The session was conducted in English.      IMPRESSION/ PLAN   During today's [facility] visit, Be had a genetic counseling session and a Maternal Fetal Medicine consultation. Please see separate consult documentation.     Be's current plan is to explore IVF pregnancy using her own eggs.  She and her  are planning expanded carrier screening through her IVF provider as a part of this care.  We discussed that any conception using her eggs would be at advanced maternal age risks for aneuploidy, and preimplantation genetic testing for aneuploidy would be recommended to improve likelihood of successful transfer.     PREGNANCY HISTORY   /Parity: G0  MEDICAL HISTORY   Be s reported medical history was the focus of her [facility] physician consult today, please see corresponding documentation for details and recommendations.        FAMILY HISTORY   A three-generation pedigree was not obtained today due to our focus on other topics. The family history was reported by Be and their partner.    The couple denies any known family history of multiple miscarriages, stillbirths, birth defects, intellectual disabilities, epilepsy, hearing or vision loss, known genetic conditions, and consanguinity.       CARRIER SCREENING   Expanded carrier screening is available to screen for autosomal recessive conditions and X-linked conditions in a large list of genes. Autosomal recessive conditions happen when a mutation has been inherited from the egg and sperm and include conditions like cystic fibrosis,  thalassemia, hearing loss, spinal muscular atrophy, and more. X-linked conditions happen when a mutation has been inherited from the egg and include conditions like fragile X syndrome.  screening was also reviewed. About [place]  Screening        The couple demonstrated a strong familiarity with carrier screening and have plans for this testing through her IVF provider.         RISK ASSESSMENT FOR CHROMOSOME CONDITIONS   We explained that the risk for fetal chromosome abnormalities increases with maternal age. We discussed specific features of common chromosome abnormalities, including Down syndrome, trisomy 13, trisomy 18, and sex chromosome trisomies. We discussed that for IVF conceptions using eggs retrieved at age 43, a majority of the conceptions may be expected to have a chromosome abnormality.  We discussed the availability of PGT-A (preimplantation genetic testing for aneuploidy) to mitigate the risks for a pregnancy to be affected with a chromosome abnormality.  If Be is unable to proceed with IVF using her own eggs, her current hope is to us a younger sister as an egg donor. The risks in those circumstances would correspond to her sister's age at egg retrieval, and would be significantly reduced relative to her own eggs. Bernard demonstrated a strong understanding of these options and risks for genetic conditions at the onset of our conversation, and our discussion was kept brief as a result.  We discussed that in any future pregnancy, screening and diagnostic testing is available at the end of the first trimester, and genetic counseling would be available to discuss and coordinate any desired screening or diagnostic testing.      It was a pleasure to be involved with Be s care. Face-to-face time of the meeting was  20  minutes.    Claudy Faith, GC, MS, [facility]  Certified and [place] Licensed Genetic Counselor  [facility] [place]  Maternal Fetal Medicine  Office: [phone]  M:  [phone]   Fax: [phone]  [facility] [place]

## 2023-08-11 DIAGNOSIS — I10 ESSENTIAL HYPERTENSION: ICD-10-CM

## 2023-08-11 DIAGNOSIS — I42.8 NONISCHEMIC CARDIOMYOPATHY (H): Primary | ICD-10-CM

## 2023-08-11 RX ORDER — CARVEDILOL 12.5 MG/1
12.5 TABLET ORAL 2 TIMES DAILY WITH MEALS
Qty: 180 TABLET | Refills: 3 | Status: SHIPPED | OUTPATIENT
Start: 2023-08-11 | End: 2023-09-28

## 2023-09-09 ENCOUNTER — HEALTH MAINTENANCE LETTER (OUTPATIENT)
Age: 43
End: 2023-09-09

## 2023-09-11 ENCOUNTER — HOSPITAL ENCOUNTER (OUTPATIENT)
Dept: CARDIOLOGY | Facility: HOSPITAL | Age: 43
Discharge: HOME | End: 2023-09-11
Attending: GENERAL ACUTE CARE HOSPITAL
Payer: COMMERCIAL

## 2023-09-11 DIAGNOSIS — I42.8 NONISCHEMIC CARDIOMYOPATHY (H): ICD-10-CM

## 2023-09-11 LAB
CV STRESS CURRENT BP HE: NORMAL
CV STRESS CURRENT HR HE: 100
CV STRESS CURRENT HR HE: 101
CV STRESS CURRENT HR HE: 102
CV STRESS CURRENT HR HE: 103
CV STRESS CURRENT HR HE: 107
CV STRESS CURRENT HR HE: 110
CV STRESS CURRENT HR HE: 110
CV STRESS CURRENT HR HE: 112
CV STRESS CURRENT HR HE: 113
CV STRESS CURRENT HR HE: 114
CV STRESS CURRENT HR HE: 118
CV STRESS CURRENT HR HE: 120
CV STRESS CURRENT HR HE: 120
CV STRESS CURRENT HR HE: 122
CV STRESS CURRENT HR HE: 126
CV STRESS CURRENT HR HE: 129
CV STRESS CURRENT HR HE: 130
CV STRESS CURRENT HR HE: 83
CV STRESS CURRENT HR HE: 86
CV STRESS CURRENT HR HE: 89
CV STRESS CURRENT HR HE: 96
CV STRESS CURRENT HR HE: 96
CV STRESS CURRENT HR HE: 97
CV STRESS CURRENT HR HE: 97
CV STRESS CURRENT HR HE: 98
CV STRESS DEVIATION TIME HE: NORMAL
CV STRESS ECHO PERCENT HR HE: NORMAL
CV STRESS EXERCISE STAGE HE: NORMAL
CV STRESS EXERCISE STAGE REACHED HE: NORMAL
CV STRESS FINAL RESTING BP HE: NORMAL
CV STRESS FINAL RESTING HR HE: 96
CV STRESS MAX HR HE: 130
CV STRESS MAX TREADMILL GRADE HE: 16
CV STRESS MAX TREADMILL SPEED HE: 4.2
CV STRESS PEAK DIA BP HE: NORMAL
CV STRESS PEAK SYS BP HE: NORMAL
CV STRESS PHASE HE: NORMAL
CV STRESS PROTOCOL HE: NORMAL
CV STRESS REASON STOPPED HE: NORMAL
CV STRESS RESTING PT POSITION HE: NORMAL
CV STRESS RESTING PT POSITION HE: NORMAL
CV STRESS ST DEVIATION AMOUNT HE: NORMAL
CV STRESS ST DEVIATION ELEVATION HE: NORMAL
CV STRESS ST EVELATION AMOUNT HE: NORMAL
CV STRESS SYMPTOMS HE: NORMAL
CV STRESS TEST TYPE HE: NORMAL
CV STRESS TOTAL STAGE TIME MIN 1 HE: NORMAL
LVEF ECHO: NORMAL
STRESS ECHO BASELINE DIASTOLIC HE: 68
STRESS ECHO BASELINE HR: 88
STRESS ECHO BASELINE SYSTOLIC BP: 102
STRESS ECHO LAST STRESS DIASTOLIC BP: 62
STRESS ECHO LAST STRESS HR: 129
STRESS ECHO LAST STRESS SYSTOLIC BP: 150
STRESS ECHO POST ESTIMATED WORKLOAD: 11.1
STRESS ECHO POST EXERCISE DUR MIN: 9
STRESS ECHO POST EXERCISE DUR SEC: 36
STRESS ECHO TARGET HR: 151

## 2023-09-11 PROCEDURE — 93306 TTE W/DOPPLER COMPLETE: CPT | Mod: 26 | Performed by: INTERNAL MEDICINE

## 2023-09-11 PROCEDURE — 255N000002 HC RX 255 OP 636: Performed by: GENERAL ACUTE CARE HOSPITAL

## 2023-09-11 PROCEDURE — 93018 CV STRESS TEST I&R ONLY: CPT | Performed by: INTERNAL MEDICINE

## 2023-09-11 PROCEDURE — 999N000208 ECHOCARDIOGRAM COMPLETE

## 2023-09-11 PROCEDURE — 93016 CV STRESS TEST SUPVJ ONLY: CPT | Performed by: INTERNAL MEDICINE

## 2023-09-11 PROCEDURE — 93017 CV STRESS TEST TRACING ONLY: CPT

## 2023-09-11 RX ADMIN — PERFLUTREN 2 ML: 6.52 INJECTION, SUSPENSION INTRAVENOUS at 08:58

## 2023-09-15 DIAGNOSIS — L40.8 OTHER PSORIASIS: ICD-10-CM

## 2023-09-15 RX ORDER — TRIAMCINOLONE ACETONIDE 5 MG/G
OINTMENT TOPICAL
Qty: 30 G | Refills: 1 | Status: SHIPPED | OUTPATIENT
Start: 2023-09-15 | End: 2024-08-18

## 2023-09-15 NOTE — TELEPHONE ENCOUNTER
Medication Question or Refill        What medication are you calling about (include dose and sig)?:   triamcinolone (KENALOG) 0.5 % external ointment     Preferred Pharmacy:     [id number] - [place], [place] - [facility] [address] [facility] [address]  [address]  [place] [address]  Phone: [phone] Fax: [phone]    Controlled Substance Agreement on file:   CSA -- Patient Level:    CSA: None found at the patient level.       Who prescribed the medication?: PCP    Do you need a refill? Yes    When did you use the medication last? NA    Patient offered an appointment? No    Do you have any questions or concerns?  No      Could we send this information to you in [employer] or would you prefer to receive a phone call?:   Patient would prefer a phone call   Okay to leave a detailed message?: Yes at Home number on file [phone] (home)

## 2023-09-19 DIAGNOSIS — E11.65 UNCONTROLLED TYPE 2 DIABETES MELLITUS WITH HYPERGLYCEMIA (H): ICD-10-CM

## 2023-09-19 RX ORDER — METFORMIN HCL 500 MG
2000 TABLET, EXTENDED RELEASE 24 HR ORAL DAILY
Qty: 360 TABLET | Refills: 3 | Status: SHIPPED | OUTPATIENT
Start: 2023-09-19 | End: 2024-01-04

## 2023-10-12 DIAGNOSIS — I50.22 CHRONIC SYSTOLIC HEART FAILURE (H): ICD-10-CM

## 2023-10-12 NOTE — TELEPHONE ENCOUNTER
Medication Question or Refill        What medication are you calling about (include dose and sig)?:   isosorbide dinitrate (ISORDIL) 10 MG tablet     Preferred Pharmacy:     [id number] - [place], [place] - [facility] [address] AT [facility] [address]  [address]  [place] [address]  Phone: [phone] Fax: [phone]    Controlled Substance Agreement on file:   CSA -- Patient Level:    CSA: None found at the patient level.       Who prescribed the medication?: Hai White    Do you need a refill? Yes    When did you use the medication last? NA    Patient offered an appointment? No    Do you have any questions or concerns?  No      Could we send this information to you in [employer] or would you prefer to receive a phone call?:   Patient would prefer a phone call   Okay to leave a detailed message?: Yes at Home number on file [phone] (home)

## 2023-10-16 DIAGNOSIS — I50.22 CHRONIC SYSTOLIC HEART FAILURE (H): ICD-10-CM

## 2023-10-16 RX ORDER — ISOSORBIDE DINITRATE 10 MG/1
10 TABLET ORAL 2 TIMES DAILY
Qty: 180 TABLET | Refills: 3 | Status: SHIPPED | OUTPATIENT
Start: 2023-10-16 | End: 2024-07-01

## 2023-10-16 RX ORDER — HYDRALAZINE HYDROCHLORIDE 25 MG/1
TABLET, FILM COATED ORAL
Qty: 180 TABLET | Refills: 2 | Status: SHIPPED | OUTPATIENT
Start: 2023-10-16 | End: 2024-07-01

## 2023-10-16 NOTE — TELEPHONE ENCOUNTER
[facility] Call Center    Phone Message    May a detailed message be left on voicemail: yes     Reason for Call: Medication Refill Request    Has the patient contacted the pharmacy for the refill? Yes   Name of medication being requested: Hydralazine   Provider who prescribed the medication: Dr Hai Ndiaye   Pharmacy: [id number] - [place], [place] - [address] AT [facility] [address]    Date medication is needed: 10/17   The patient said she will be out by tomorrow     Action Taken: Other: Cardiology    Travel Screening: Not Applicable    Thank you!  [facility]

## 2023-10-30 ENCOUNTER — NURSE TRIAGE (OUTPATIENT)
Dept: NURSING | Facility: CLINIC | Age: 43
End: 2023-10-30
Payer: COMMERCIAL

## 2023-10-30 DIAGNOSIS — E06.3 HASHIMOTO'S THYROIDITIS: ICD-10-CM

## 2023-10-30 DIAGNOSIS — E03.9 HYPOTHYROIDISM, UNSPECIFIED TYPE: ICD-10-CM

## 2023-10-30 RX ORDER — LEVOTHYROXINE SODIUM 75 UG/1
75 TABLET ORAL DAILY
Qty: 90 TABLET | Refills: 3 | OUTPATIENT
Start: 2023-10-30

## 2023-10-31 ENCOUNTER — LAB REQUISITION (OUTPATIENT)
Dept: LAB | Facility: CLINIC | Age: 43
End: 2023-10-31

## 2023-10-31 DIAGNOSIS — O20.9 HEMORRHAGE IN EARLY PREGNANCY, UNSPECIFIED: ICD-10-CM

## 2023-10-31 PROCEDURE — 86901 BLOOD TYPING SEROLOGIC RH(D): CPT | Performed by: OBSTETRICS & GYNECOLOGY

## 2023-10-31 PROCEDURE — 84702 CHORIONIC GONADOTROPIN TEST: CPT | Performed by: OBSTETRICS & GYNECOLOGY

## 2023-11-01 LAB
ABO/RH(D): NORMAL
HCG INTACT+B SERPL-ACNC: 158 MIU/ML
SPECIMEN EXPIRATION DATE: NORMAL

## 2023-11-02 ENCOUNTER — TELEPHONE (OUTPATIENT)
Dept: FAMILY MEDICINE | Facility: CLINIC | Age: 43
End: 2023-11-02
Payer: COMMERCIAL

## 2023-11-02 DIAGNOSIS — E11.65 UNCONTROLLED TYPE 2 DIABETES MELLITUS WITH HYPERGLYCEMIA (H): Primary | ICD-10-CM

## 2023-11-02 DIAGNOSIS — Z32.01 PREGNANCY TEST POSITIVE: ICD-10-CM

## 2023-11-02 NOTE — TELEPHONE ENCOUNTER
Charisse,    Pt just found out she is pregnant, was instructed to stop taking Metformin on Friday 10/27/23, BS is running between 130 to 141. Pt is inquiring what she should do?    Of note, pt is experiencing hemorrhaging, has lab to check for viability.     Glo WELLINGTON RN  [facility]

## 2023-11-02 NOTE — TELEPHONE ENCOUNTER
I just put in referrals for her to see diabetic educator and endocrine.  The diabetic  should be reaching out to her soon.

## 2023-11-03 ENCOUNTER — LAB REQUISITION (OUTPATIENT)
Dept: LAB | Facility: CLINIC | Age: 43
End: 2023-11-03

## 2023-11-03 DIAGNOSIS — N97.9 FEMALE INFERTILITY, UNSPECIFIED: ICD-10-CM

## 2023-11-03 PROCEDURE — 84702 CHORIONIC GONADOTROPIN TEST: CPT | Performed by: OBSTETRICS & GYNECOLOGY

## 2023-11-04 LAB — HCG INTACT+B SERPL-ACNC: 106 MIU/ML

## 2023-11-06 NOTE — TELEPHONE ENCOUNTER
RN called patient and notified her of referrals, instructed her to call clinic back if she has not been contacted to schedule by the end of this week. Patient verbalizes understanding.

## 2023-11-10 ENCOUNTER — VIRTUAL VISIT (OUTPATIENT)
Dept: EDUCATION SERVICES | Facility: CLINIC | Age: 43
End: 2023-11-10
Payer: COMMERCIAL

## 2023-11-10 DIAGNOSIS — E11.65 UNCONTROLLED TYPE 2 DIABETES MELLITUS WITH HYPERGLYCEMIA (H): ICD-10-CM

## 2023-11-10 DIAGNOSIS — Z32.01 PREGNANCY TEST POSITIVE: ICD-10-CM

## 2023-11-10 PROCEDURE — 99207 PR NO CHARGE LOS: CPT | Performed by: DIETITIAN, REGISTERED

## 2023-11-10 NOTE — PROGRESS NOTES
Diabetes Education Follow-up, no charge    Subjective/Objective:    Review of Diabetes education during pregnancy with Be Alexis. Last date of communication was: 6/9/23.    Diabetes is being managed with Lifestyle (diet/activity), Diabetes Medications   Diabetes Medication(s)       Biguanides       metFORMIN (GLUCOPHAGE XR) 500 MG 24 hr tablet    Take 4 tablets (2,000 mg) by mouth daily     metFORMIN (GLUCOPHAGE) 500 MG tablet    TAKE 2 TABLETS( 1000 MG) WITH BREAKFAST AND TWO TABLETS( 1000 MG) WITH DINNER.              Assessment:  Be was scheduling for Dm education for pregnancy for today's visit. Be shared that became pregnant naturally, but at this time her pregnancy is most likely not viable. She is being followed by her infertility clinic.  She stopped taking Metformin after finding out she was pregnant, and she plans on resuming if the pregnancy is not viable.  I recommended that she follow up with the infertility clinic on their recommendations for what medications for her to be taking as she is planning on going through IVF in early 2024.   Be also wanted to schedule a follow up with her PCP, which was scheduled for her today.         Plan/Response:  See Patient Instructions for co-developed, patient-stated behavior change goals.  Follow up through your infertility clinic regarding medication recommendations for Diabetes leading up to IVF in early 2024.   PCP follow up scheduled on 12/5/23          Any diabetes medication dose changes were made via the CDE Protocol and Collaborative Practice Agreement with the patient's primary care provider. A copy of this encounter was shared with the provider.

## 2023-11-10 NOTE — PATIENT INSTRUCTIONS
Follow up through your infertility clinic regarding medication recommendations for Diabetes leading up to infertility treatment in early 2024.   PCP follow up scheduled on 12/5/23: 3pm

## 2023-11-10 NOTE — LETTER
11/10/2023         RE: Be Alexis  [address]  [facility] [address]        Dear Colleague,    Thank you for referring your patient, Be Alexis, to the [facility]. Please see a copy of my visit note below.    Diabetes Education Follow-up, no charge    Subjective/Objective:    Review of Diabetes education during pregnancy with Be Alexis. Last date of communication was: 6/9/23.    Diabetes is being managed with Lifestyle (diet/activity), Diabetes Medications   Diabetes Medication(s)       Biguanides       metFORMIN (GLUCOPHAGE XR) 500 MG 24 hr tablet    Take 4 tablets (2,000 mg) by mouth daily     metFORMIN (GLUCOPHAGE) 500 MG tablet    TAKE 2 TABLETS( 1000 MG) WITH BREAKFAST AND TWO TABLETS( 1000 MG) WITH DINNER.              Assessment:  Be was scheduling for Dm education for pregnancy for today's visit. Be shared that became pregnant naturally, but at this time her pregnancy is most likely not viable. She is being followed by her infertility clinic.  She stopped taking Metformin after finding out she was pregnant, and she plans on resuming if the pregnancy is not viable.  I recommended that she follow up with the infertility clinic on their recommendations for what medications for her to be taking as she is planning on going through IVF in early 2024.   Be also wanted to schedule a follow up with her PCP, which was scheduled for her today.         Plan/Response:  See Patient Instructions for co-developed, patient-stated behavior change goals.  Follow up through your infertility clinic regarding medication recommendations for Diabetes leading up to IVF in early 2024.   PCP follow up scheduled on 12/5/23          Any diabetes medication dose changes were made via the CDE Protocol and Collaborative Practice Agreement with the patient's primary care provider. A copy of this encounter was shared with the provider.

## 2023-11-14 NOTE — LETTER
Be Alexis  [address]  [place] [facility] [address]        November 21, 2023        Dear Be,    As a valued [facility] patient, your healthcare needs are our priority.    Your health care team has determined that you are due for an appointment  Endocrinology  .   We encourage you to call or schedule an appointment with your primary care provider to discuss your overdue screening and schedule an appointment.     If you already have had your screening performed at another health care facility, please ask that practice to send your results to [facility] [phone] and we will update your health records. This will ensure you receive the best possible care from our providers.      If you have any questions or need help with scheduling, please call the [facility] at [phone].        Yours in health,       Your care team at [facility]

## 2023-11-14 NOTE — NURSING NOTE
"Tracie Ruiz is a 68 y.o. male who presents for Medicare Annual Wellness Visit Subsequent     Shortness of breath: Still with some shortness of breath on exertion but he is significantly improved after azithromycin.     Anxiety: Doing well since things have calmed down with his wife and he is on sertraline as well. No SE's. Palpitations have resolved.      Skin cancer: recently had 2 skin cancer lesions removed at [place]. Believes he is overdue for followup.      DM: On metformin, glipizide, and actos.     Peripheral neuropathy: On gabapentin, feels it is helping.     Right knee pain: He has had 3 scopes in the 90's, still with knee pain which seems to be worsening. He has been told he probably will need a knee replacement for around 40 years but he is extremely anxious about pain from possible knee replacement surgery.      BPH: On flomax. He gets up to urinate a lot at night. He has been on flomax for a year. He was on otc meds for the prostate that he ended up stopping. He can hold his urine but he gets up 5 times a night to urinate.      HLD: On atorvastatin, no SE's.      Review of systems completed and unremarkable other than what is documented in HPI.    Objective   Ht 1.778 m (5' 10\")   Wt 111 kg (244 lb)   BMI 35.01 kg/m²     Gen: No acute distress, alert and oriented x3, pleasant   HEENT: moist mucous membranes, b/l external auditory canals are clear of debris, TMs within normal limits, no oropharyngeal lesions, eomi, perrla   Neck: thyroid within normal limits, no lymphadenopathy   CV: RRR, normal S1/S2, no murmur   Resp: Clear to auscultation bilaterally, no wheezes or rhonchi appreciated  Abd: soft, nontender, non-distended, no guarding/rigidity, bowel sounds present  Extr: no edema, no calf tenderness  Derm: Skin is warm and dry, no rashes appreciated  Psych: mood is good, affect is congruent, good hygiene, normal speech and eye contact  Neuro: cranial nerves grossly intact, normal " Be, accompanied by her SO, here in clinic for MFM Preconception consult. VS done, see flow sheet. PT denies any cardiac symptoms, dizziness, HA or SOB. PT states she is feeling stressed this week and knew her BP was going to be high. Be reports taking her BP at home 1 time a week, states systolic is usually in 130's and does not recall the diastolic reading. Be is planning IVF after her A1C is undercontrol, unsure if she is using her own egg or donor egg at this time.   Darrel Norwood and Allen met with pt, see consult note.   Radha Flores RN     gait    Assessment/Plan     #Irritability  #Insomnia  Had ED with prozac in the past  Trial sertraline  Followup 6 weeks to discuss     #Shortness of breath  Check CT chest, EKG  Trial azithromyin     #Skin cancer:  #Atopic dermatitis  Established with derm  Currently planning to try head and shoulders     #DM:   Close to good control  On metformin, glipizide, and actos     #Peripheral neuropathy:   Reasonable control on gabapentin     #Right knee pain:  Likely due for replacement  Not interested in surgical eval at present     #BPH:   On flomax  Add finasteride  Referring to urology for eval     #HLD:   Controlled on atorvastatin, no SE's     HCM:  Declining flu vaccine, he is UTD for PNA vaccine

## 2023-11-14 NOTE — LETTER
November 14, 2023  Re: Be Alexis  YOB: 1980    Dear Colleague,    Thank you for your referral to the [facility] ENDOCRINOLOGY [facility]. We have been unable to schedule the referral after several contact attempts.      If you have any questions or concerns, please contact our office at Dept: [phone].        Sincerely,  [facility] ENDOCRINOLOGY [facility]

## 2023-11-17 NOTE — PROGRESS NOTES
LMTCB #1, if pt calls back please help pt schedule an appt. as mention. Will call back Monday, thanks!

## 2023-11-18 ENCOUNTER — HEALTH MAINTENANCE LETTER (OUTPATIENT)
Age: 43
End: 2023-11-18

## 2023-11-20 NOTE — PROGRESS NOTES
LMTCB #2, if pt calls back please help pt schedule an appt. as mention. Will call back tomorrow, thanks!

## 2023-11-21 NOTE — PROGRESS NOTES
LMTCB #3. if pt calls back please help pt schedule an appt. as mention. Will send a letter, thanks!

## 2023-12-31 ENCOUNTER — MYC REFILL (OUTPATIENT)
Dept: FAMILY MEDICINE | Facility: CLINIC | Age: 43
End: 2023-12-31
Payer: COMMERCIAL

## 2023-12-31 DIAGNOSIS — E06.3 HASHIMOTO'S THYROIDITIS: ICD-10-CM

## 2023-12-31 DIAGNOSIS — E03.9 HYPOTHYROIDISM, UNSPECIFIED TYPE: ICD-10-CM

## 2024-01-02 RX ORDER — LEVOTHYROXINE SODIUM 75 UG/1
75 TABLET ORAL DAILY
Qty: 90 TABLET | Refills: 3 | Status: SHIPPED | OUTPATIENT
Start: 2024-01-02

## 2024-01-04 ENCOUNTER — OFFICE VISIT (OUTPATIENT)
Dept: FAMILY MEDICINE | Facility: CLINIC | Age: 44
End: 2024-01-04
Payer: COMMERCIAL

## 2024-01-04 VITALS
RESPIRATION RATE: 18 BRPM | WEIGHT: 138 LBS | HEIGHT: 59 IN | DIASTOLIC BLOOD PRESSURE: 68 MMHG | HEART RATE: 86 BPM | SYSTOLIC BLOOD PRESSURE: 111 MMHG | BODY MASS INDEX: 27.82 KG/M2 | TEMPERATURE: 97.6 F | OXYGEN SATURATION: 98 %

## 2024-01-04 DIAGNOSIS — I10 MALIGNANT ESSENTIAL HYPERTENSION: ICD-10-CM

## 2024-01-04 DIAGNOSIS — I50.22 CHRONIC SYSTOLIC HEART FAILURE (H): ICD-10-CM

## 2024-01-04 DIAGNOSIS — Z12.4 CERVICAL CANCER SCREENING: ICD-10-CM

## 2024-01-04 DIAGNOSIS — E06.3 HASHIMOTO'S THYROIDITIS: ICD-10-CM

## 2024-01-04 DIAGNOSIS — D50.9 IRON DEFICIENCY ANEMIA, UNSPECIFIED IRON DEFICIENCY ANEMIA TYPE: ICD-10-CM

## 2024-01-04 DIAGNOSIS — O03.9 MISCARRIAGE: ICD-10-CM

## 2024-01-04 DIAGNOSIS — E11.59 CONTROLLED TYPE 2 DIABETES MELLITUS WITH OTHER CIRCULATORY COMPLICATION, WITHOUT LONG-TERM CURRENT USE OF INSULIN (H): Primary | ICD-10-CM

## 2024-01-04 DIAGNOSIS — E78.1 HYPERTRIGLYCERIDEMIA: ICD-10-CM

## 2024-01-04 LAB
ALT SERPL W P-5'-P-CCNC: 35 U/L (ref 0–50)
ANION GAP SERPL CALCULATED.3IONS-SCNC: 11 MMOL/L (ref 7–15)
BUN SERPL-MCNC: 13.4 MG/DL (ref 6–20)
CALCIUM SERPL-MCNC: 9.5 MG/DL (ref 8.6–10)
CHLORIDE SERPL-SCNC: 104 MMOL/L (ref 98–107)
CHOLEST SERPL-MCNC: 174 MG/DL
CREAT SERPL-MCNC: 0.94 MG/DL (ref 0.51–0.95)
CREAT UR-MCNC: 109 MG/DL
DEPRECATED HCO3 PLAS-SCNC: 22 MMOL/L (ref 22–29)
EGFRCR SERPLBLD CKD-EPI 2021: 77 ML/MIN/1.73M2
ERYTHROCYTE [DISTWIDTH] IN BLOOD BY AUTOMATED COUNT: 13.1 % (ref 10–15)
FASTING STATUS PATIENT QL REPORTED: YES
GLUCOSE SERPL-MCNC: 141 MG/DL (ref 70–99)
HBA1C MFR BLD: 6.6 % (ref 0–5.6)
HCG INTACT+B SERPL-ACNC: <1 MIU/ML
HCT VFR BLD AUTO: 37.6 % (ref 35–47)
HDLC SERPL-MCNC: 36 MG/DL
HGB BLD-MCNC: 11.7 G/DL (ref 11.7–15.7)
LDLC SERPL CALC-MCNC: 93 MG/DL
MCH RBC QN AUTO: 27.9 PG (ref 26.5–33)
MCHC RBC AUTO-ENTMCNC: 31.1 G/DL (ref 31.5–36.5)
MCV RBC AUTO: 90 FL (ref 78–100)
MICROALBUMIN UR-MCNC: <12 MG/L
MICROALBUMIN/CREAT UR: NORMAL MG/G{CREAT}
NONHDLC SERPL-MCNC: 138 MG/DL
NT-PROBNP SERPL-MCNC: <36 PG/ML (ref 0–450)
PLATELET # BLD AUTO: 319 10E3/UL (ref 150–450)
POTASSIUM SERPL-SCNC: 4.2 MMOL/L (ref 3.4–5.3)
RBC # BLD AUTO: 4.19 10E6/UL (ref 3.8–5.2)
SODIUM SERPL-SCNC: 137 MMOL/L (ref 135–145)
TRIGL SERPL-MCNC: 227 MG/DL
TSH SERPL DL<=0.005 MIU/L-ACNC: 1.02 UIU/ML (ref 0.3–4.2)
WBC # BLD AUTO: 10.3 10E3/UL (ref 4–11)

## 2024-01-04 PROCEDURE — 36415 COLL VENOUS BLD VENIPUNCTURE: CPT | Performed by: PHYSICIAN ASSISTANT

## 2024-01-04 PROCEDURE — 82570 ASSAY OF URINE CREATININE: CPT | Performed by: PHYSICIAN ASSISTANT

## 2024-01-04 PROCEDURE — 82043 UR ALBUMIN QUANTITATIVE: CPT | Performed by: PHYSICIAN ASSISTANT

## 2024-01-04 PROCEDURE — 99215 OFFICE O/P EST HI 40 MIN: CPT | Mod: 25 | Performed by: PHYSICIAN ASSISTANT

## 2024-01-04 PROCEDURE — 83036 HEMOGLOBIN GLYCOSYLATED A1C: CPT | Performed by: PHYSICIAN ASSISTANT

## 2024-01-04 PROCEDURE — 80061 LIPID PANEL: CPT | Performed by: PHYSICIAN ASSISTANT

## 2024-01-04 PROCEDURE — 84460 ALANINE AMINO (ALT) (SGPT): CPT | Performed by: PHYSICIAN ASSISTANT

## 2024-01-04 PROCEDURE — 90686 IIV4 VACC NO PRSV 0.5 ML IM: CPT | Performed by: PHYSICIAN ASSISTANT

## 2024-01-04 PROCEDURE — 84443 ASSAY THYROID STIM HORMONE: CPT | Performed by: PHYSICIAN ASSISTANT

## 2024-01-04 PROCEDURE — 99207 PR FOOT EXAM NO CHARGE: CPT | Performed by: PHYSICIAN ASSISTANT

## 2024-01-04 PROCEDURE — 90472 IMMUNIZATION ADMIN EACH ADD: CPT | Performed by: PHYSICIAN ASSISTANT

## 2024-01-04 PROCEDURE — 80048 BASIC METABOLIC PNL TOTAL CA: CPT | Performed by: PHYSICIAN ASSISTANT

## 2024-01-04 PROCEDURE — 90715 TDAP VACCINE 7 YRS/> IM: CPT | Performed by: PHYSICIAN ASSISTANT

## 2024-01-04 PROCEDURE — 90471 IMMUNIZATION ADMIN: CPT | Performed by: PHYSICIAN ASSISTANT

## 2024-01-04 PROCEDURE — 84702 CHORIONIC GONADOTROPIN TEST: CPT | Performed by: PHYSICIAN ASSISTANT

## 2024-01-04 PROCEDURE — 85027 COMPLETE CBC AUTOMATED: CPT | Performed by: PHYSICIAN ASSISTANT

## 2024-01-04 PROCEDURE — 83880 ASSAY OF NATRIURETIC PEPTIDE: CPT | Performed by: PHYSICIAN ASSISTANT

## 2024-01-04 RX ORDER — METFORMIN HCL 500 MG
2000 TABLET, EXTENDED RELEASE 24 HR ORAL DAILY
Qty: 360 TABLET | Refills: 3 | Status: SHIPPED | OUTPATIENT
Start: 2024-01-04

## 2024-01-04 NOTE — PROGRESS NOTES
Subjective:    Be Alexis is a 43 year old female who presents with chief complaint of diabetes check and other concerns.    1.  Diabetes check  Taking metformin, doing well.    2.  CHF follow-up  History of CHF.  Doing well.  Had her last cardiology visit earlier this year.  Had some screening heart exams.  Message from cardiology on 9/15/2023 says that her heart is almost back to normal.  They told her no further follow-up is needed unless problems.    3.  Medication check  Refills of some of her medications.    4.  Follow-up miscarriage  Has been trialed for a long time to get pregnant.  Started to see infertility.  This fall she missed 2.  And then started spotting.  Had a positive pregnancy test.  Was following with another provider.  Beta-hCG levels are going down.  They reviewed with her this likely means pregnancy was not healthy.  About a week after that she had vaginal bleeding.  She says she had bleeding off and on for about 3 weeks.  Then bleeding stopped completely.  She has had at least 2 weeks now that she has not had any bleeding.  Feels back to normal.  Has had some negative home urine pregnancy tests since bleeding stopped.  She wants to make sure miscarriage has resolved completely.    Of note, she unfortunately got a COVID infection about 1 month ago.  With her first time ever with COVID here.  Did get fairly sick.  Did not contact us to take Paxlovid.  I recommended that if she get COVID again, she contact us and consider that.        Diabetes:   She presents for follow up of diabetes.  She is checking home blood glucose one time daily.   She checks blood glucose before meals.  Blood glucose is never over 200 and never under 70. She is aware of hypoglycemia symptoms including shakiness.   She is concerned about other.    She is not experiencing numbness or burning in feet, excessive thirst, blurry vision, weight changes or redness, sores or blisters on feet. The patient has had a diabetic eye  exam in the last 12 months. Eye exam performed on 2023. [facility].       Patient Active Problem List   Diagnosis    Seborrheic Dermatitis Of The Scalp    Hypothyroidism    Psoriasis    Menorrhagia with regular cycle    Iron deficiency anemia    Leiomyoma of uterus    Malignant essential hypertension    Hashimoto's thyroiditis    Chronic systolic heart failure (H)    Heart failure with reduced ejection fraction, NYHA class II (H)    Uncontrolled type 2 diabetes mellitus with hyperglycemia (H)    Mixed hyperlipidemia    Hypertriglyceridemia    Irregular periods    Hyperglycemia       Current Outpatient Medications:     ACCU-CHEK GUIDE test strip, Use to test blood sugar twice daily., Disp: 100 strip, Rfl: 6    blood glucose monitoring (SOFTCLIX) lancets, Use to test blood sugar twice daily., Disp: 100 each, Rfl: 4    Blood Glucose Monitoring Suppl (ACCU-CHEK GUIDE ME) w/Device KIT, 1 each as needed, Disp: 1 kit, Rfl: 0    carvedilol (COREG) 25 MG tablet, Take 1 tablet (25 mg) by mouth 2 times daily (with meals), Disp: 180 tablet, Rfl: 3    hydrALAZINE (APRESOLINE) 25 MG tablet, TAKE 1 TABLET(25 MG) BY MOUTH TWICE DAILY, Disp: 180 tablet, Rfl: 2    isosorbide dinitrate (ISORDIL) 10 MG tablet, Take 1 tablet (10 mg) by mouth 2 times daily, Disp: 180 tablet, Rfl: 3    levothyroxine (SYNTHROID/LEVOTHROID) 75 MCG tablet, Take 1 tablet (75 mcg) by mouth daily, Disp: 90 tablet, Rfl: 3    metFORMIN (GLUCOPHAGE XR) 500 MG 24 hr tablet, Take 4 tablets (2,000 mg) by mouth daily, Disp: 360 tablet, Rfl: 3    multivitamin with minerals (THERA-M) 9 mg iron-400 mcg Tab tablet, [MULTIVITAMIN WITH MINERALS (THERA-M) 9 MG IRON-400 MCG TAB TABLET] Take 1 tablet by mouth daily., Disp: , Rfl: 0    triamcinolone (KENALOG) 0.5 % external ointment, Apply to affected areas daily, as needed., Disp: 30 g, Rfl: 1      Objective:   Allergies:  Lisinopril    Vitals:  Vitals:    01/04/24 1050   BP: 111/68   BP Location:  "Left arm   Patient Position: Sitting   Cuff Size: Adult Regular   Pulse: 86   Resp: 18   Temp: 97.6  F (36.4  C)   TempSrc: Temporal   SpO2: 98%   Weight: 62.6 kg (138 lb)   Height: 1.49 m (4' 10.66\")       Body mass index is 28.2 kg/m .    Vital signs reviewed.  General: Patient is alert and oriented x 3, in no apparent distress  Cardiac: regular rate and rhythm, no murmurs  Pulmonary: lungs clear to auscultation bilaterally, no crackles, rales, rhonchi, or wheezing noted  Diabetic Foot Exam:  Normal Exam.  Normal pedal pulses bilaterally.  Skin appears healthy and without significant injury.  Sensation is intact to monofilament bilaterally.      Results for orders placed or performed in visit on 01/04/24   Hemoglobin A1c     Status: Abnormal   Result Value Ref Range    Hemoglobin A1C 6.6 (H) 0.0 - 5.6 %   CBC with platelets     Status: Abnormal   Result Value Ref Range    WBC Count 10.3 4.0 - 11.0 10e3/uL    RBC Count 4.19 3.80 - 5.20 10e6/uL    Hemoglobin 11.7 11.7 - 15.7 g/dL    Hematocrit 37.6 35.0 - 47.0 %    MCV 90 78 - 100 fL    MCH 27.9 26.5 - 33.0 pg    MCHC 31.1 (L) 31.5 - 36.5 g/dL    RDW 13.1 10.0 - 15.0 %    Platelet Count 319 150 - 450 10e3/uL     Other labs pending.      Assessment and Plan:   1. Controlled type 2 diabetes mellitus with other circulatory complication, without long-term current use of insulin (H)  Chronic, well-controlled.  Continue with metformin 4 pills daily.  No concerns.  She has lost 10 pounds recently.  She is making a renewed effort to exercise more, she wants to continue to lose about 10 more pounds.  A1c: 6.6%, continual improvement over the past year  Eye Exam: Approximately 6/1/2023 at [facility]  Foot Exam: Done today, normal  Smoking: No  On a statin: No, due to age and planning pregnancy  Blood Pressure: Well-controlled  Microalbumin: Ordered today, results pending  On ACE inhibitor: No, due to age and planning pregnancy  Follow-up diabetes check in 5 months.  - " metFORMIN (GLUCOPHAGE XR) 500 MG 24 hr tablet; Take 4 tablets (2,000 mg) by mouth daily  Dispense: 360 tablet; Refill: 3  - Hemoglobin A1c  - Basic metabolic panel  - Lipid Profile  - Albumin Random Urine Quantitative with Creat Ratio  - FOOT EXAM  //  2. Chronic systolic heart failure (H)  3. Malignant essential hypertension  Chronic, stable.  Going well.  Taking her medications.  Screening labs ordered today.  Has been following with cardiology and they feel like her heart is pretty much back to normal.  Last visit with Cardiology around 9/15/23.  Patient said no further follow-up is needed unless problems.  - BNP-N terminal pro; Future  - BNP-N terminal pro  - ALT; Future  - ALT    4. Hypertriglyceridemia  Chronic, stable.  Screening labs ordered and I will follow-up with results.  - Lipid Profile    5. Iron deficiency anemia, unspecified iron deficiency anemia type  Chronic, stable.  Not taking anything specific for iron at this time.  Screening labs ordered and I will follow-up with results.  - CBC with platelets    6. Hashimoto's thyroiditis  Chronic, stable.  Continue with levothyroxine.  Screening lab ordered and I will follow-up with results.  - TSH with free T4 reflex    7. Miscarriage  History of spontaneous pregnancy with miscarriage about 2 months ago.  She followed with OB/GYN for this.  Bleeding has now stopped completely.  Beta-hCG level ordered today.  Of note, she is continuing to follow with infertility for possible IVF type treatments.  - hCG Quantitative Pregnancy; Future  - hCG Quantitative Pregnancy    8.  Healthcare maintenance  Reviewed she is due for Pap smear.  She will come back sometime in the next several months to get that done.  Flu and tetanus shots updated today.  She declined COVID booster at this time, as she just had COVID infection within the past month.       40 minutes spent on the date of the encounter doing chart review, history and exam, and documentation.      This  dictation uses voice recognition software, which may contain typographical errors.

## 2024-03-29 ENCOUNTER — E-VISIT (OUTPATIENT)
Dept: FAMILY MEDICINE | Facility: CLINIC | Age: 44
End: 2024-03-29
Payer: COMMERCIAL

## 2024-03-29 DIAGNOSIS — N92.6 IRREGULAR MENSES: Primary | ICD-10-CM

## 2024-03-29 PROCEDURE — 99207 PR NON-BILLABLE SERV PER CHARTING: CPT | Performed by: PHYSICIAN ASSISTANT

## 2024-03-31 ENCOUNTER — HEALTH MAINTENANCE LETTER (OUTPATIENT)
Age: 44
End: 2024-03-31

## 2024-04-01 NOTE — PATIENT INSTRUCTIONS
Be,   Are you seeing OBGYN or an infertility doctor?  That is who I would recommend you follow-up with.  We are happy to see you here for an office visit, but we won't be able to do as much as a specialist would.  Let me know if you have questions.  Charisse    You won t be charged for this eVisit.

## 2024-06-09 ENCOUNTER — HEALTH MAINTENANCE LETTER (OUTPATIENT)
Age: 44
End: 2024-06-09

## 2024-06-22 DIAGNOSIS — I42.8 NONISCHEMIC CARDIOMYOPATHY (H): ICD-10-CM

## 2024-06-22 DIAGNOSIS — I10 ESSENTIAL HYPERTENSION: ICD-10-CM

## 2024-06-24 RX ORDER — CARVEDILOL 25 MG/1
25 TABLET ORAL 2 TIMES DAILY WITH MEALS
Qty: 180 TABLET | Refills: 0 | Status: SHIPPED | OUTPATIENT
Start: 2024-06-24 | End: 2024-07-01

## 2024-07-01 ENCOUNTER — OFFICE VISIT (OUTPATIENT)
Dept: CARDIOLOGY | Facility: CLINIC | Age: 44
End: 2024-07-01
Payer: COMMERCIAL

## 2024-07-01 VITALS
SYSTOLIC BLOOD PRESSURE: 100 MMHG | RESPIRATION RATE: 14 BRPM | DIASTOLIC BLOOD PRESSURE: 68 MMHG | BODY MASS INDEX: 29.22 KG/M2 | WEIGHT: 143 LBS | HEART RATE: 84 BPM

## 2024-07-01 DIAGNOSIS — I42.8 NONISCHEMIC CARDIOMYOPATHY (H): ICD-10-CM

## 2024-07-01 DIAGNOSIS — I50.22 CHRONIC SYSTOLIC HEART FAILURE (H): ICD-10-CM

## 2024-07-01 DIAGNOSIS — I10 ESSENTIAL HYPERTENSION: ICD-10-CM

## 2024-07-01 PROCEDURE — 99214 OFFICE O/P EST MOD 30 MIN: CPT | Performed by: PHYSICIAN ASSISTANT

## 2024-07-01 RX ORDER — CARVEDILOL 25 MG/1
25 TABLET ORAL 2 TIMES DAILY WITH MEALS
Qty: 180 TABLET | Refills: 3 | Status: SHIPPED | OUTPATIENT
Start: 2024-07-01 | End: 2024-09-24

## 2024-07-01 RX ORDER — HYDRALAZINE HYDROCHLORIDE 25 MG/1
25 TABLET, FILM COATED ORAL 2 TIMES DAILY
Qty: 180 TABLET | Refills: 3 | Status: SHIPPED | OUTPATIENT
Start: 2024-07-01

## 2024-07-01 NOTE — LETTER
7/1/2024    Charisse Padgett PA-C  [address] [facility] [address]    RE: Be Alexis       Dear Colleague,     I had the pleasure of seeing Be Alexis in the [facility].          HEART CARE FOLLOW UP    Primary Care: Charisse Padgett MD  Primary Cardiologist: Dr Ndiaye      Assessment/Recommendations     Non-ischemic cardiomyopathy  Heart failure with id-range ejection fraction  Dx with nahun EF of 18% in 2020. [place] for thyroid induced cardiomyopathy versus hypertensive heart disease. Reassuring stress test in September 2023. Echocardiogram at that time also showed normalization of cardiac function with normal LV size and function, EF 60-65%, grade I or early diastolic dysfunction.  She has been increasing her activity as of late and feels excellent, no limitations.  Carvedilol 25 mg BID - if she becomes pregnant, recommend transition to labetalol, start with 300 mg BID and titrate up to goal blood pressure, max of 800 mg BID  Hydralazine 25 mg BID - safe during pregnancy   Discontinue isosorbide 10 mg BID, blood pressure on the low end of normal  Noted allergy to lisinopril, in setting of normalization of EF, can stay off of ACE/ARB for now. If decrement again in EF, allergy was cough. Can try Entresto or valsartan   Primary Prevention  Dyslipidemia,  in 2023 improved to 174 in January 2024. Most recent LDL 93. and LDL 93.   We discussed the role diet, exercise and weight management can play in managing dyslipidemia.   We dicussed that DM is a large risk factor for ASCVD      Return to care in  2 years  with Dr Ndiaye, see PCP between.      History of Present Illness/Subjective    Be Alexis is a 44 year old female with past medical history significant for:  Non-ischemic Cardiomyopathy, concern for hypertensive heart disease   Nahun EF 18% in 2020   Most recent EF 60-65% in September 2024.  Heart failure with improved ejection fraction   Hypertension   Hyperlipidemia  Type II  DM  Hypothyroidism   obesity    This is a 44 year odl female with history of non-ischemic cardiomyopathy who presents for follow up. The patient was last seen in in clinic in June 2023 by Dr Ndiaye. At that time she had recently been diagnosed with type II DM and started on metformin.  In August 2023 they started to pursue fertility treatment. In January of this year they were seen by PCP re: DM.     Today the patient tells me she's doing mostly well, no concerns she is starting IVF treatments. She is trying to work out every other day and frustrated she is no losing weight. She does 1-2 miles on the treadmill and lifts weights. She is tolerating this and feels better exercised. Patient denies chest pain, pressure and tightness. No shortness of breath or dyspnea on exertion. No dizziness, lightheadedness, pre-syncope or syncope. No palpitations or racing heart. Sleeping well without orthopnea or PND. No leg swelling or cramping. No claudication symptoms. No blood in stool or urine. No fevers, chills or cough.          Data Review Today:     9/11/2023 Exercise Stress Test:  Interpretation Summary  1.Average exercise tolerance for age with patient achieving 11.1 METS on the  standard Rm protocol.  2.Inadequate negative exercise ECG for ischemia as patient achieved heart rate  of 130 for only 73% of age-predicted maximal heart rate, no cardiovascular  symptoms, no ECG changes with blood pressure 150/60 after 9 minutes and 36  seconds on the standard Rm protocol.  3.Low risk Duke treadmill score of 9.  4.Negative exercise treadmill, however sensitivity limited as patient only  achieved 73% of age-predicted maximal heart rate.  No prior study for comparison.    9/11/2023 TTE  Interpretation Summary     1.Left ventricular size, wall motion and function are normal. The ejection  fraction is 60-65%.  2.Grade I or early diastolic dysfunction.  3.Mildly decreased right ventricular systolic function  4.No hemodynamically  significant valvular abnormalities on 2D or color flow  imaging.  There is no comparison study available.  ________________________________    ECHO 4/8/2021 (report reviewed):     Left ventricle ejection fraction is mildly decreased. The calculated left ventricular ejection fraction is 45%.    Normal right ventricular size. TAPSE is abnormal, which is consistent with abnormal right ventricular systolic function.    No significant valvular abnormalities.    When compared to the previous study dated 12/15/2020, LVEF is higher.     Stress test 12/17/2020 (report reviewed):   1. Lexiscan stress cardiac MRI is negative for inducible myocardial ischemia.  2. Left ventricular cavity size is mildly dilated. There is moderate concentric hypertrophy. Systolic function is severely  reduced with global hypokinesis. The calculated left ventricular ejection fraction is 21%.  3. Right ventricular cavity size is normal with with low normal right ventricular systolic function. The calculated right  ventricular ejection fraction is 45%.  4. No evidence of prior infarction or other focal myocardial scarring or fibrosis.  5. No significant valvular abnormalities.  6. Small circumferential pericardial effusion.      I have reviewed and updated the patient's past medical history, allergy list and medication list.          Physical Examination   Vitals: /68 (BP Location: Left arm, Patient Position: Sitting, Cuff Size: Adult Regular)   Pulse 84   Resp 14   Wt 64.9 kg (143 lb)   BMI 29.22 kg/m      BMI= Body mass index is 29.22 kg/m .    Wt Readings from Last 3 Encounters:   07/01/24 64.9 kg (143 lb)   01/04/24 62.6 kg (138 lb)   06/13/23 62.6 kg (138 lb)       General :   Alert and oriented, in no acute distress.    HEENT:  Normocephalic and atraumatic. .    Neck: No JVP, carotid bruit or obvious thyromegaly.   Lungs:   Respirations unlabored. Clear bilaterally with no rales, rhonchi, or wheezes.     Cardiovascular:   Rhythm is  regular. S1 and S2 are normal. No significant murmur is present. Lower extremities demonstrate no significant edema.        Skin: Skin is warm, dry, and otherwise intact.   Neurologic: Gait not assessed. Mood and affect appropriate.           Medical History  Surgical History Family History Social History   Past Medical History:   Diagnosis Date    Anemia     severe, Hbg 7.2 in 1/2016    CHF (congestive heart failure) (H)     Endometrial disorder 01/01/2016    pelvic u/s showed heterogenous stripe; getting it biopsied in 2/2016    HLD (hyperlipidemia)     Hypothyroid     as of 1/2016 bumped up to 50mcg per day    Iron deficiency anemia 04/06/2017    Leiomyoma of uterus 04/13/2017    Overweight     Psoriasis     Type 2 diabetes mellitus (H)     Past Surgical History:   Procedure Laterality Date    HYSTEROSCOPY, ABLATE ENDOMETRIUM HYDROTHERMAL, COMBINED N/A 4/13/2017    Procedure: HYSTEROSCOPY DILATION AND CURETTAGE, MYOMECTOMY;  Surgeon: Alyse Barnett MD;  Location: [facility] [place];  Service: Gynecology    Family History   Problem Relation Age of Onset    No Known Problems Mother     No Known Problems Father     Social History     Socioeconomic History    Marital status:      Spouse name: Not on file    Number of children: Not on file    Years of education: Not on file    Highest education level: Not on file   Occupational History    Not on file   Tobacco Use    Smoking status: Never    Smokeless tobacco: Never   Substance and Sexual Activity    Alcohol use: No    Drug use: No    Sexual activity: Yes     Partners: Male     Birth control/protection: OCP   Other Topics Concern    Not on file   Social History Narrative    Not on file     Social Determinants of Health     Financial Resource Strain: Low Risk  (1/4/2024)    Financial Resource Strain     Within the past 12 months, have you or your family members you live with been unable to get utilities (heat, electricity) when it was really needed?: No    Food Insecurity: Low Risk  (1/4/2024)    Food Insecurity     Within the past 12 months, did you worry that your food would run out before you got money to buy more?: No     Within the past 12 months, did the food you bought just not last and you didn t have money to get more?: No   Transportation Needs: Low Risk  (1/4/2024)    Transportation Needs     Within the past 12 months, has lack of transportation kept you from medical appointments, getting your medicines, non-medical meetings or appointments, work, or from getting things that you need?: No   Physical Activity: Not on file   Stress: Not on file   Social Connections: Not on file   Interpersonal Safety: Low Risk  (1/4/2024)    Interpersonal Safety     Do you feel physically and emotionally safe where you currently live?: Yes     Within the past 12 months, have you been hit, slapped, kicked or otherwise physically hurt by someone?: No     Within the past 12 months, have you been humiliated or emotionally abused in other ways by your partner or ex-partner?: No   Housing Stability: Low Risk  (1/4/2024)    Housing Stability     Do you have housing? : Yes     Are you worried about losing your housing?: No          Medications  Allergies   Scheduled Meds:  Current Outpatient Medications   Medication Sig Dispense Refill    ACCU-CHEK GUIDE test strip Use to test blood sugar twice daily. 100 strip 6    blood glucose monitoring (SOFTCLIX) lancets Use to test blood sugar twice daily. 100 each 4    Blood Glucose Monitoring Suppl (ACCU-CHEK GUIDE ME) w/Device KIT 1 each as needed 1 kit 0    carvedilol (COREG) 25 MG tablet Take 1 tablet (25 mg) by mouth 2 times daily (with meals) Schedule follow up for further refills 180 tablet 3    hydrALAZINE (APRESOLINE) 25 MG tablet Take 1 tablet (25 mg) by mouth 2 times daily 180 tablet 3    levothyroxine (SYNTHROID/LEVOTHROID) 75 MCG tablet Take 1 tablet (75 mcg) by mouth daily 90 tablet 3    metFORMIN (GLUCOPHAGE XR) 500 MG 24 hr  tablet Take 4 tablets (2,000 mg) by mouth daily 360 tablet 3    multivitamin with minerals (THERA-M) 9 mg iron-400 mcg Tab tablet [MULTIVITAMIN WITH MINERALS (THERA-M) 9 MG IRON-400 MCG TAB TABLET] Take 1 tablet by mouth daily.  0    triamcinolone (KENALOG) 0.5 % external ointment Apply to affected areas daily, as needed. 30 g 1    Allergies   Allergen Reactions    Lisinopril Cough         Lab Results    Chemistry/lipid CBC Cardiac Enzymes/BNP/TSH/INR   Lab Results   Component Value Date    CHOL 174 01/04/2024    HDL 36 (L) 01/04/2024    TRIG 227 (H) 01/04/2024    BUN 13.4 01/04/2024     01/04/2024    CO2 22 01/04/2024    Lab Results   Component Value Date    WBC 10.3 01/04/2024    HGB 11.7 01/04/2024    HCT 37.6 01/04/2024    MCV 90 01/04/2024     01/04/2024    @RESUFAST(BMP,CBC,BNP,TSH,  INR)@        This note has been dictated using voice recognition software. Any grammatical, typographical, or context distortions are unintentional and inherent to the software.    Mary Ellen Quintero PA-C, RD  General Cardiology           Thank you for allowing me to participate in the care of your patient.      Sincerely,     Mary Ellen Quintero PA-C      Heart Care  cc:   Hai Ndiaye MD  [address] [facility] [address]  [place] [address]

## 2024-07-01 NOTE — PATIENT INSTRUCTIONS
It was great to see you today! Your care team includes myself and Dr. Ndiaye.    Medication changes:  STOP isosorbide     Continue other medications. Continue diet and exercise. Add strength training.    Follow up in 2 years with Dr Ndiaye.       If you have questions, concerns, or new concerning symptoms prior to your next appointment, please contact the Cardiology Nursing team at [phone].    For scheduling issues/changes, please call [phone].

## 2024-07-11 NOTE — PROGRESS NOTES
HEART CARE FOLLOW UP    Primary Care: Charisse Padgett MD  Primary Cardiologist: Dr Ndiaye      Assessment/Recommendations     Non-ischemic cardiomyopathy  Heart failure with id-range ejection fraction  Dx with nahun EF of 18% in 2020. [place] for thyroid induced cardiomyopathy versus hypertensive heart disease. Reassuring stress test in September 2023. Echocardiogram at that time also showed normalization of cardiac function with normal LV size and function, EF 60-65%, grade I or early diastolic dysfunction.  She has been increasing her activity as of late and feels excellent, no limitations.  Carvedilol 25 mg BID - if she becomes pregnant, recommend transition to labetalol, start with 300 mg BID and titrate up to goal blood pressure, max of 800 mg BID  Hydralazine 25 mg BID - safe during pregnancy   Discontinue isosorbide 10 mg BID, blood pressure on the low end of normal  Noted allergy to lisinopril, in setting of normalization of EF, can stay off of ACE/ARB for now. If decrement again in EF, allergy was cough. Can try Entresto or valsartan   Primary Prevention  Dyslipidemia,  in 2023 improved to 174 in January 2024. Most recent LDL 93. and LDL 93.   We discussed the role diet, exercise and weight management can play in managing dyslipidemia.   We dicussed that DM is a large risk factor for ASCVD      Return to care in  2 years  with Dr Ndiaye, see PCP between.      History of Present Illness/Subjective    Be Alexis is a 44 year old female with past medical history significant for:  Non-ischemic Cardiomyopathy, concern for hypertensive heart disease   Nahun EF 18% in 2020   Most recent EF 60-65% in September 2024.  Heart failure with improved ejection fraction   Hypertension   Hyperlipidemia  Type II DM  Hypothyroidism   obesity    This is a 44 year odl female with history of non-ischemic cardiomyopathy who presents for follow up. The patient was last seen in in clinic in June 2023 by Dr Ndiaye.  Patient  on 24 @ [id number] at [facility].     At that time she had recently been diagnosed with type II DM and started on metformin.  In August 2023 they started to pursue fertility treatment. In January of this year they were seen by PCP re: DM.     Today the patient tells me she's doing mostly well, no concerns she is starting IVF treatments. She is trying to work out every other day and frustrated she is no losing weight. She does 1-2 miles on the treadmill and lifts weights. She is tolerating this and feels better exercised. Patient denies chest pain, pressure and tightness. No shortness of breath or dyspnea on exertion. No dizziness, lightheadedness, pre-syncope or syncope. No palpitations or racing heart. Sleeping well without orthopnea or PND. No leg swelling or cramping. No claudication symptoms. No blood in stool or urine. No fevers, chills or cough.          Data Review Today:     9/11/2023 Exercise Stress Test:  Interpretation Summary  1.Average exercise tolerance for age with patient achieving 11.1 METS on the  standard Rm protocol.  2.Inadequate negative exercise ECG for ischemia as patient achieved heart rate  of 130 for only 73% of age-predicted maximal heart rate, no cardiovascular  symptoms, no ECG changes with blood pressure 150/60 after 9 minutes and 36  seconds on the standard Rm protocol.  3.Low risk Duke treadmill score of 9.  4.Negative exercise treadmill, however sensitivity limited as patient only  achieved 73% of age-predicted maximal heart rate.  No prior study for comparison.    9/11/2023 TTE  Interpretation Summary     1.Left ventricular size, wall motion and function are normal. The ejection  fraction is 60-65%.  2.Grade I or early diastolic dysfunction.  3.Mildly decreased right ventricular systolic function  4.No hemodynamically significant valvular abnormalities on 2D or color flow  imaging.  There is no comparison study available.  ________________________________    ECHO 4/8/2021 (report reviewed):     Left ventricle  ejection fraction is mildly decreased. The calculated left ventricular ejection fraction is 45%.    Normal right ventricular size. TAPSE is abnormal, which is consistent with abnormal right ventricular systolic function.    No significant valvular abnormalities.    When compared to the previous study dated 12/15/2020, LVEF is higher.     Stress test 12/17/2020 (report reviewed):   1. Lexiscan stress cardiac MRI is negative for inducible myocardial ischemia.  2. Left ventricular cavity size is mildly dilated. There is moderate concentric hypertrophy. Systolic function is severely  reduced with global hypokinesis. The calculated left ventricular ejection fraction is 21%.  3. Right ventricular cavity size is normal with with low normal right ventricular systolic function. The calculated right  ventricular ejection fraction is 45%.  4. No evidence of prior infarction or other focal myocardial scarring or fibrosis.  5. No significant valvular abnormalities.  6. Small circumferential pericardial effusion.      I have reviewed and updated the patient's past medical history, allergy list and medication list.          Physical Examination   Vitals: /68 (BP Location: Left arm, Patient Position: Sitting, Cuff Size: Adult Regular)   Pulse 84   Resp 14   Wt 64.9 kg (143 lb)   BMI 29.22 kg/m      BMI= Body mass index is 29.22 kg/m .    Wt Readings from Last 3 Encounters:   07/01/24 64.9 kg (143 lb)   01/04/24 62.6 kg (138 lb)   06/13/23 62.6 kg (138 lb)       General :   Alert and oriented, in no acute distress.    HEENT:  Normocephalic and atraumatic. .    Neck: No JVP, carotid bruit or obvious thyromegaly.   Lungs:   Respirations unlabored. Clear bilaterally with no rales, rhonchi, or wheezes.     Cardiovascular:   Rhythm is regular. S1 and S2 are normal. No significant murmur is present. Lower extremities demonstrate no significant edema.        Skin: Skin is warm, dry, and otherwise intact.   Neurologic: Gait not  assessed. Mood and affect appropriate.           Medical History  Surgical History Family History Social History   Past Medical History:   Diagnosis Date    Anemia     severe, Hbg 7.2 in 1/2016    CHF (congestive heart failure) (H)     Endometrial disorder 01/01/2016    pelvic u/s showed heterogenous stripe; getting it biopsied in 2/2016    HLD (hyperlipidemia)     Hypothyroid     as of 1/2016 bumped up to 50mcg per day    Iron deficiency anemia 04/06/2017    Leiomyoma of uterus 04/13/2017    Overweight     Psoriasis     Type 2 diabetes mellitus (H)     Past Surgical History:   Procedure Laterality Date    HYSTEROSCOPY, ABLATE ENDOMETRIUM HYDROTHERMAL, COMBINED N/A 4/13/2017    Procedure: HYSTEROSCOPY DILATION AND CURETTAGE, MYOMECTOMY;  Surgeon: Alyse Barnett MD;  Location: [facility] [place];  Service: Gynecology    Family History   Problem Relation Age of Onset    No Known Problems Mother     No Known Problems Father     Social History     Socioeconomic History    Marital status:      Spouse name: Not on file    Number of children: Not on file    Years of education: Not on file    Highest education level: Not on file   Occupational History    Not on file   Tobacco Use    Smoking status: Never    Smokeless tobacco: Never   Substance and Sexual Activity    Alcohol use: No    Drug use: No    Sexual activity: Yes     Partners: Male     Birth control/protection: OCP   Other Topics Concern    Not on file   Social History Narrative    Not on file     Social Determinants of Health     Financial Resource Strain: Low Risk  (1/4/2024)    Financial Resource Strain     Within the past 12 months, have you or your family members you live with been unable to get utilities (heat, electricity) when it was really needed?: No   Food Insecurity: Low Risk  (1/4/2024)    Food Insecurity     Within the past 12 months, did you worry that your food would run out before you got money to buy more?: No     Within the past 12  months, did the food you bought just not last and you didn t have money to get more?: No   Transportation Needs: Low Risk  (1/4/2024)    Transportation Needs     Within the past 12 months, has lack of transportation kept you from medical appointments, getting your medicines, non-medical meetings or appointments, work, or from getting things that you need?: No   Physical Activity: Not on file   Stress: Not on file   Social Connections: Not on file   Interpersonal Safety: Low Risk  (1/4/2024)    Interpersonal Safety     Do you feel physically and emotionally safe where you currently live?: Yes     Within the past 12 months, have you been hit, slapped, kicked or otherwise physically hurt by someone?: No     Within the past 12 months, have you been humiliated or emotionally abused in other ways by your partner or ex-partner?: No   Housing Stability: Low Risk  (1/4/2024)    Housing Stability     Do you have housing? : Yes     Are you worried about losing your housing?: No          Medications  Allergies   Scheduled Meds:  Current Outpatient Medications   Medication Sig Dispense Refill    ACCU-CHEK GUIDE test strip Use to test blood sugar twice daily. 100 strip 6    blood glucose monitoring (SOFTCLIX) lancets Use to test blood sugar twice daily. 100 each 4    Blood Glucose Monitoring Suppl (ACCU-CHEK GUIDE ME) w/Device KIT 1 each as needed 1 kit 0    carvedilol (COREG) 25 MG tablet Take 1 tablet (25 mg) by mouth 2 times daily (with meals) Schedule follow up for further refills 180 tablet 3    hydrALAZINE (APRESOLINE) 25 MG tablet Take 1 tablet (25 mg) by mouth 2 times daily 180 tablet 3    levothyroxine (SYNTHROID/LEVOTHROID) 75 MCG tablet Take 1 tablet (75 mcg) by mouth daily 90 tablet 3    metFORMIN (GLUCOPHAGE XR) 500 MG 24 hr tablet Take 4 tablets (2,000 mg) by mouth daily 360 tablet 3    multivitamin with minerals (THERA-M) 9 mg iron-400 mcg Tab tablet [MULTIVITAMIN WITH MINERALS (THERA-M) 9 MG IRON-400 MCG TAB  TABLET] Take 1 tablet by mouth daily.  0    triamcinolone (KENALOG) 0.5 % external ointment Apply to affected areas daily, as needed. 30 g 1    Allergies   Allergen Reactions    Lisinopril Cough         Lab Results    Chemistry/lipid CBC Cardiac Enzymes/BNP/TSH/INR   Lab Results   Component Value Date    CHOL 174 01/04/2024    HDL 36 (L) 01/04/2024    TRIG 227 (H) 01/04/2024    BUN 13.4 01/04/2024     01/04/2024    CO2 22 01/04/2024    Lab Results   Component Value Date    WBC 10.3 01/04/2024    HGB 11.7 01/04/2024    HCT 37.6 01/04/2024    MCV 90 01/04/2024     01/04/2024    @RESUFAST(BMP,CBC,BNP,TSH,  INR)@        This note has been dictated using voice recognition software. Any grammatical, typographical, or context distortions are unintentional and inherent to the software.    Mary Ellen Quintero PA-C, RD  General Cardiology

## 2024-07-17 ENCOUNTER — VIRTUAL VISIT (OUTPATIENT)
Dept: FAMILY MEDICINE | Facility: CLINIC | Age: 44
End: 2024-07-17
Payer: COMMERCIAL

## 2024-07-17 DIAGNOSIS — N97.9 FEMALE INFERTILITY: ICD-10-CM

## 2024-07-17 DIAGNOSIS — Z12.31 VISIT FOR SCREENING MAMMOGRAM: ICD-10-CM

## 2024-07-17 DIAGNOSIS — E11.9 TYPE 2 DIABETES MELLITUS WITHOUT COMPLICATION, WITHOUT LONG-TERM CURRENT USE OF INSULIN (H): Primary | ICD-10-CM

## 2024-07-17 PROBLEM — E11.65 UNCONTROLLED TYPE 2 DIABETES MELLITUS WITH HYPERGLYCEMIA (H): Status: RESOLVED | Noted: 2023-01-17 | Resolved: 2024-07-17

## 2024-07-17 PROCEDURE — 99213 OFFICE O/P EST LOW 20 MIN: CPT | Mod: 95 | Performed by: FAMILY MEDICINE

## 2024-07-17 NOTE — PROGRESS NOTES
Be is a 44 year old who is being evaluated via a billable video visit.    How would you like to obtain your AVS? MyChart  If the video visit is dropped, the invitation should be resent by: Text to cell phone: [phone]  Will anyone else be joining your video visit? No         1. Visit for screening mammogram  Not Main reason for visit today.  Patient is 44 years old and due for mammogram and going to be undergoing workup for IVF in the near future.  Now would be the time to get a mammogram.  Discussed new recommendations making it clear that mammogram should be done starting at age 40.  Will refer.    2. Type 2 diabetes mellitus without complication, without long-term current use of insulin (H)  Currently on metformin, likely in good control though blood sugars lately have been a little higher, averaging around 1 40-1/51 thing in the morning.  She was told by her infertility clinic that she should work to transition off of metformin and onto insulin.  This visit is to start that.  Will begin at low-dose basilar insulin, Lantus 7 units, have her follow-up with Guadalupe, who she is familiar with.  Discussed probability that with pregnancy she will need bolus insulin as well.  She already has a CGM.  Recently saw ophthalmology.    3. Female infertility  In relation to above, IVF, appointment timing still to be determined.  Patient is going to be having D&C for recent miscarriage.      Vitals:  No vitals were obtained today due to virtual visit.    Physical Exam   GENERAL: alert and no distress  EYES: Eyes grossly normal to inspection.  No discharge or erythema, or obvious scleral/conjunctival abnormalities.  RESP: No audible wheeze, cough, or visible cyanosis.    SKIN: Visible skin clear. No significant rash, abnormal pigmentation or lesions.  NEURO: Cranial nerves grossly intact.  Mentation and speech appropriate for age.  PSYCH: Appropriate affect, tone, and pace of words    Most recent A1c 6.6  Video-Visit  Details    Type of service:  Video Visit   Originating Location (pt. Location): Home    Distant Location (provider location):  On-site  Platform used for Video Visit: Lolita  Signed Electronically by: Isaias Cervantes MD    Answers submitted by the patient for this visit:  General Questionnaire (Submitted on 7/17/2024)  Chief Complaint: Chronic problems general questions HPI Form  What is the reason for your visit today? : med

## 2024-07-19 ENCOUNTER — ANESTHESIA EVENT (OUTPATIENT)
Dept: SURGERY | Facility: AMBULATORY SURGERY CENTER | Age: 44
End: 2024-07-19
Payer: COMMERCIAL

## 2024-07-22 ENCOUNTER — ANESTHESIA (OUTPATIENT)
Dept: SURGERY | Facility: AMBULATORY SURGERY CENTER | Age: 44
End: 2024-07-22
Payer: COMMERCIAL

## 2024-07-22 ENCOUNTER — HOSPITAL ENCOUNTER (OUTPATIENT)
Facility: AMBULATORY SURGERY CENTER | Age: 44
Discharge: HOME | End: 2024-07-22
Attending: OBSTETRICS & GYNECOLOGY
Payer: COMMERCIAL

## 2024-07-22 VITALS
RESPIRATION RATE: 16 BRPM | TEMPERATURE: 97.3 F | HEART RATE: 59 BPM | DIASTOLIC BLOOD PRESSURE: 72 MMHG | SYSTOLIC BLOOD PRESSURE: 117 MMHG | OXYGEN SATURATION: 92 %

## 2024-07-22 DIAGNOSIS — R93.89 THICKENED ENDOMETRIUM: ICD-10-CM

## 2024-07-22 DIAGNOSIS — Z98.890 STATUS POST HYSTEROSCOPY: Primary | ICD-10-CM

## 2024-07-22 LAB
GLUCOSE SERPL-MCNC: 122 MG/DL (ref 70–99)
HCG UR QL: NEGATIVE
INTERNAL QC OK POCT: NORMAL
POCT KIT EXPIRATION DATE: NORMAL
POCT KIT LOT NUMBER: NORMAL

## 2024-07-22 RX ORDER — PROPOFOL 10 MG/ML
INJECTION, EMULSION INTRAVENOUS PRN
Status: DISCONTINUED | OUTPATIENT
Start: 2024-07-22 | End: 2024-07-22

## 2024-07-22 RX ORDER — LIDOCAINE HYDROCHLORIDE 20 MG/ML
INJECTION, SOLUTION INFILTRATION; PERINEURAL PRN
Status: DISCONTINUED | OUTPATIENT
Start: 2024-07-22 | End: 2024-07-22

## 2024-07-22 RX ORDER — FENTANYL CITRATE 50 UG/ML
INJECTION, SOLUTION INTRAMUSCULAR; INTRAVENOUS PRN
Status: DISCONTINUED | OUTPATIENT
Start: 2024-07-22 | End: 2024-07-22

## 2024-07-22 RX ORDER — ACETAMINOPHEN 325 MG/1
975 TABLET ORAL EVERY 6 HOURS PRN
COMMUNITY
Start: 2024-07-22

## 2024-07-22 RX ORDER — OXYCODONE HYDROCHLORIDE 5 MG/1
5 TABLET ORAL
Status: DISCONTINUED | OUTPATIENT
Start: 2024-07-22 | End: 2024-07-24 | Stop reason: HOSPADM

## 2024-07-22 RX ORDER — ACETAMINOPHEN 325 MG/1
975 TABLET ORAL ONCE
Status: DISCONTINUED | OUTPATIENT
Start: 2024-07-22 | End: 2024-07-24 | Stop reason: HOSPADM

## 2024-07-22 RX ORDER — ACETAMINOPHEN 325 MG/1
975 TABLET ORAL ONCE
Status: COMPLETED | OUTPATIENT
Start: 2024-07-22 | End: 2024-07-22

## 2024-07-22 RX ORDER — LIDOCAINE HYDROCHLORIDE 10 MG/ML
INJECTION, SOLUTION EPIDURAL; INFILTRATION; INTRACAUDAL; PERINEURAL PRN
Status: DISCONTINUED | OUTPATIENT
Start: 2024-07-22 | End: 2024-07-22 | Stop reason: HOSPADM

## 2024-07-22 RX ORDER — IBUPROFEN 800 MG/1
800 TABLET, FILM COATED ORAL EVERY 6 HOURS PRN
COMMUNITY
Start: 2024-07-22

## 2024-07-22 RX ORDER — PROPOFOL 10 MG/ML
INJECTION, EMULSION INTRAVENOUS CONTINUOUS PRN
Status: DISCONTINUED | OUTPATIENT
Start: 2024-07-22 | End: 2024-07-22

## 2024-07-22 RX ORDER — ONDANSETRON 2 MG/ML
4 INJECTION INTRAMUSCULAR; INTRAVENOUS EVERY 30 MIN PRN
Status: DISCONTINUED | OUTPATIENT
Start: 2024-07-22 | End: 2024-07-24 | Stop reason: HOSPADM

## 2024-07-22 RX ORDER — ONDANSETRON 2 MG/ML
INJECTION INTRAMUSCULAR; INTRAVENOUS PRN
Status: DISCONTINUED | OUTPATIENT
Start: 2024-07-22 | End: 2024-07-22

## 2024-07-22 RX ORDER — ONDANSETRON 4 MG/1
4 TABLET, ORALLY DISINTEGRATING ORAL EVERY 30 MIN PRN
Status: DISCONTINUED | OUTPATIENT
Start: 2024-07-22 | End: 2024-07-24 | Stop reason: HOSPADM

## 2024-07-22 RX ORDER — LIDOCAINE 40 MG/G
CREAM TOPICAL
Status: DISCONTINUED | OUTPATIENT
Start: 2024-07-22 | End: 2024-07-24 | Stop reason: HOSPADM

## 2024-07-22 RX ORDER — DEXAMETHASONE SODIUM PHOSPHATE 4 MG/ML
4 INJECTION, SOLUTION INTRA-ARTICULAR; INTRALESIONAL; INTRAMUSCULAR; INTRAVENOUS; SOFT TISSUE
Status: DISCONTINUED | OUTPATIENT
Start: 2024-07-22 | End: 2024-07-24 | Stop reason: HOSPADM

## 2024-07-22 RX ORDER — DEXAMETHASONE SODIUM PHOSPHATE 4 MG/ML
INJECTION, SOLUTION INTRA-ARTICULAR; INTRALESIONAL; INTRAMUSCULAR; INTRAVENOUS; SOFT TISSUE PRN
Status: DISCONTINUED | OUTPATIENT
Start: 2024-07-22 | End: 2024-07-22

## 2024-07-22 RX ORDER — SODIUM CHLORIDE, SODIUM LACTATE, POTASSIUM CHLORIDE, CALCIUM CHLORIDE 600; 310; 30; 20 MG/100ML; MG/100ML; MG/100ML; MG/100ML
INJECTION, SOLUTION INTRAVENOUS CONTINUOUS
Status: DISCONTINUED | OUTPATIENT
Start: 2024-07-22 | End: 2024-07-24 | Stop reason: HOSPADM

## 2024-07-22 RX ORDER — NALOXONE HYDROCHLORIDE 0.4 MG/ML
0.1 INJECTION, SOLUTION INTRAMUSCULAR; INTRAVENOUS; SUBCUTANEOUS
Status: DISCONTINUED | OUTPATIENT
Start: 2024-07-22 | End: 2024-07-24 | Stop reason: HOSPADM

## 2024-07-22 RX ORDER — IBUPROFEN 800 MG/1
800 TABLET, FILM COATED ORAL ONCE
Status: DISCONTINUED | OUTPATIENT
Start: 2024-07-22 | End: 2024-07-24 | Stop reason: HOSPADM

## 2024-07-22 RX ADMIN — PROPOFOL 200 MCG/KG/MIN: 10 INJECTION, EMULSION INTRAVENOUS at 14:09

## 2024-07-22 RX ADMIN — FENTANYL CITRATE 50 MCG: 50 INJECTION, SOLUTION INTRAMUSCULAR; INTRAVENOUS at 14:09

## 2024-07-22 RX ADMIN — ONDANSETRON 4 MG: 2 INJECTION INTRAMUSCULAR; INTRAVENOUS at 14:08

## 2024-07-22 RX ADMIN — PROPOFOL 40 MG: 10 INJECTION, EMULSION INTRAVENOUS at 14:09

## 2024-07-22 RX ADMIN — DEXAMETHASONE SODIUM PHOSPHATE 4 MG: 4 INJECTION, SOLUTION INTRA-ARTICULAR; INTRALESIONAL; INTRAMUSCULAR; INTRAVENOUS; SOFT TISSUE at 14:15

## 2024-07-22 RX ADMIN — SODIUM CHLORIDE, SODIUM LACTATE, POTASSIUM CHLORIDE, CALCIUM CHLORIDE: 600; 310; 30; 20 INJECTION, SOLUTION INTRAVENOUS at 13:18

## 2024-07-22 RX ADMIN — LIDOCAINE HYDROCHLORIDE 3 ML: 20 INJECTION, SOLUTION INFILTRATION; PERINEURAL at 14:09

## 2024-07-22 RX ADMIN — ACETAMINOPHEN 975 MG: 325 TABLET ORAL at 13:08

## 2024-07-22 NOTE — ANESTHESIA CARE TRANSFER NOTE
Patient: Be Alexis    Procedure: Procedure(s):  HYSTEROSCOPY, WITH DILATION AND CURETTAGE       Diagnosis: Thickened endometrium [R93.89]  Diagnosis Additional Information: No value filed.    Anesthesia Type:   MAC     Note:    Oropharynx: oropharynx clear of all foreign objects and spontaneously breathing  Level of Consciousness: drowsy  Oxygen Supplementation: face mask  Level of Supplemental Oxygen (L/min / FiO2): 10  Independent Airway: airway patency satisfactory and stable  Dentition: dentition unchanged  Vital Signs Stable: post-procedure vital signs reviewed and stable  Report to RN Given: handoff report given  Patient transferred to: Phase II    Handoff Report: Identifed the Patient, Identified the Reponsible Provider, Reviewed the pertinent medical history, Discussed the surgical course, Reviewed Intra-OP anesthesia mangement and issues during anesthesia, Set expectations for post-procedure period and Allowed opportunity for questions and acknowledgement of understanding      Vitals:  Vitals Value Taken Time   /82 07/22/24 1439   Temp 97.3  F (36.3  C) 07/22/24 1438   Pulse 76 07/22/24 1439   Resp     SpO2 99 % 07/22/24 1439   Vitals shown include unfiled device data.  RR 10  Electronically Signed By: DHRUV Sosa CRNA  July 22, 2024  2:42 PM

## 2024-07-22 NOTE — DISCHARGE INSTRUCTIONS
If you have any questions or concerns regarding your procedure, please contact Dr. Arambula, her office number is [phone].     You have received 975 mg of Acetaminophen (Tylenol) at 1 PM. Please do not take an additional dose of Tylenol until after 7 PM     Do not exceed 4,000 mg of acetaminophen during a 24 hour period and keep in mind that acetaminophen can also be found in many over-the-counter cold medications as well as narcotics that may be given for pain.

## 2024-07-22 NOTE — ANESTHESIA PREPROCEDURE EVALUATION
Anesthesia Pre-Procedure Evaluation    Patient: Be Alexis   MRN: [id number] : 1980        Procedure : Procedure(s):  HYSTEROSCOPY, WITH DILATION AND CURETTAGE, POSSIBLE MYOMECTOMY          Past Medical History:   Diagnosis Date    Anemia     severe, Hbg 7.2 in 2016    CHF (congestive heart failure) (H)     Endometrial disorder 2016    pelvic u/s showed heterogenous stripe; getting it biopsied in 2016    HLD (hyperlipidemia)     Hypothyroid     as of 2016 bumped up to 50mcg per day    Iron deficiency anemia 2017    Leiomyoma of uterus 2017    Overweight     Psoriasis     Type 2 diabetes mellitus (H)       Past Surgical History:   Procedure Laterality Date    HYSTEROSCOPY, ABLATE ENDOMETRIUM HYDROTHERMAL, COMBINED N/A 2017    Procedure: HYSTEROSCOPY DILATION AND CURETTAGE, MYOMECTOMY;  Surgeon: Alyse Barnett MD;  Location: [facility];  Service: Gynecology      Allergies   Allergen Reactions    Lisinopril Cough      Social History     Tobacco Use    Smoking status: Never    Smokeless tobacco: Never   Substance Use Topics    Alcohol use: No      Wt Readings from Last 1 Encounters:   24 64.9 kg (143 lb)        Anesthesia Evaluation            ROS/MED HX  ENT/Pulmonary:  - neg pulmonary ROS     Neurologic:       Cardiovascular:     (+) Dyslipidemia hypertension- -   -  - -      CHF                           Previous cardiac testing   Echo: Date: 2023 Results:   1.Left ventricular size, wall motion and function are normal. The ejection  fraction is 60-65%.  2.Grade I or early diastolic dysfunction.  3.Mildly decreased right ventricular systolic function  4.No hemodynamically significant valvular abnormalities on 2D or color flow  imaging.  There is no comparison study available.    Stress Test:  Date: Results:    ECG Reviewed:  Date: Results:    Cath:  Date: Results:   (-) murmur   METS/Exercise Tolerance:     Hematologic:       Musculoskeletal:       GI/Hepatic:        Renal/Genitourinary:       Endo:     (+)  type II DM,        thyroid problem,            Psychiatric/Substance Use:       Infectious Disease:       Malignancy:       Other: Comment: Missed ab           Physical Exam    Airway  airway exam normal      Mallampati: II   TM distance: > 3 FB   Neck ROM: full   Mouth opening: > 3 cm    Respiratory Devices and Support         Dental       (+) Completely normal teeth      Cardiovascular   cardiovascular exam normal       Rhythm and rate: regular and normal (-) no murmur    Pulmonary   pulmonary exam normal        breath sounds clear to auscultation           OUTSIDE LABS:  CBC:   Lab Results   Component Value Date    WBC 10.3 01/04/2024    WBC 9.4 01/17/2023    HGB 11.7 01/04/2024    HGB 13.8 01/17/2023    HCT 37.6 01/04/2024    HCT 41.9 01/17/2023     01/04/2024     01/17/2023     BMP:   Lab Results   Component Value Date     01/04/2024     (L) 01/17/2023    POTASSIUM 4.2 01/04/2024    POTASSIUM 3.9 01/17/2023    CHLORIDE 104 01/04/2024    CHLORIDE 100 01/17/2023    CO2 22 01/04/2024    CO2 18 (L) 01/17/2023    BUN 13.4 01/04/2024    BUN 12.3 01/17/2023    CR 0.94 01/04/2024    CR 0.87 01/17/2023     (H) 01/04/2024     (H) 01/17/2023     COAGS:   Lab Results   Component Value Date    PTT 31 12/15/2020    INR 0.96 12/15/2020     POC:   Lab Results   Component Value Date    HCG Negative 01/17/2023    HCGS Negative 12/15/2020     HEPATIC:   Lab Results   Component Value Date    ALBUMIN 4.1 01/17/2023    PROTTOTAL 7.6 01/17/2023    ALT 35 01/04/2024    AST 43 (H) 01/17/2023    ALKPHOS 83 01/17/2023    BILITOTAL 0.3 01/17/2023     OTHER:   Lab Results   Component Value Date    A1C 6.6 (H) 01/04/2024    TAWANNA 9.5 01/04/2024    MAG 2.6 12/18/2020    TSH 1.02 01/04/2024    T4 1.08 01/17/2023    CRP 0.3 12/15/2020       Anesthesia Plan    ASA Status:  2    NPO Status:  NPO Appropriate    Anesthesia Type: MAC.     - Reason for MAC: straight  local not clinically adequate              Consents    Anesthesia Plan(s) and associated risks, benefits, and realistic alternatives discussed. Questions answered and patient/representative(s) expressed understanding.     - Discussed: Risks, Benefits and Alternatives for BOTH SEDATION and the PROCEDURE were discussed     - Discussed with:  Patient            Postoperative Care    Pain management: IV analgesics, Oral pain medications, Multi-modal analgesia.   PONV prophylaxis: Ondansetron (or other 5HT-3), Background Propofol Infusion     Comments:    Other Comments: Risks of MAC anesthesia including but not limited to recall, awareness, and potential conversion to general anesthesia discussed.           Carlos Shukla MD    I have reviewed the pertinent notes and labs in the chart from the past 30 days and (re)examined the patient.  Any updates or changes from those notes are reflected in this note.

## 2024-07-22 NOTE — ANESTHESIA POSTPROCEDURE EVALUATION
Patient: Be Alexis    Procedure: Procedure(s):  HYSTEROSCOPY, WITH DILATION AND CURETTAGE       Anesthesia Type:  MAC    Note:  Disposition: Outpatient   Postop Pain Control: Uneventful            Sign Out: Well controlled pain   PONV: No   Neuro/Psych: Uneventful            Sign Out: Acceptable/Baseline neuro status   Airway/Respiratory: Uneventful            Sign Out: Acceptable/Baseline resp. status   CV/Hemodynamics: Uneventful            Sign Out: Acceptable CV status; No obvious hypovolemia; No obvious fluid overload   Other NRE: NONE   DID A NON-ROUTINE EVENT OCCUR? No           Last vitals:  Vitals Value Taken Time   /74 07/22/24 1545   Temp 97.3  F (36.3  C) 07/22/24 1438   Pulse 63 07/22/24 1546   Resp     SpO2 95 % 07/22/24 1546   Vitals shown include unfiled device data.    Electronically Signed By: Carlos Shukla MD  July 22, 2024  4:05 PM

## 2024-07-22 NOTE — OP NOTE
PROCEDURE NOTE - HYSTEROSCOPY AND DILATION AND CURETTAGE     Name: Be Alexis   : 1980   MRN: [id number]     DATE OF SERVICE:  2024     PREOPERATIVE DIAGNOSIS: Thickened Endometrium    POSTOPERATIVE DIAGNOSIS:   Same  Band of tissue at the fundal region    PROCEDURES: Hysteroscopy, dilatation and curettage,attempt at removal of the fundal tissue    SURGEON: Lefty Arambula MD     ANESTHESIA:  mac    ESTIMATED BLOOD LOSS:   2 cc    HISTORY OF PRESENT ILLNESS:  This is a 44 year old female who is attempting pregnancy. She has known fibroids. She is having testing. On US, there were multiple fibroids noted. There was concern for a fibroid in the lining. She was given informed consent for the above procedure. The risks, benefits and alternatives were discussed with her including but not exclusive to uterine perforation, bleeding and infection. She expressed understanding and wished to proceed.     PROCEDURE:  The patient was brought to the operating room and after induction of MAC anesthesia was prepped and draped in the dorsal lithotomy position. A time out was called and the patient and the procedure were verified.    A sterile weighted speculum was then placed. The anterior lip of the cervix was grasped with a single tooth tenaculum. Uterine cavity was then sounded to 7 cm. The cervix was already dilated. The hysteroscope was placed without issue.  The cavity of the uterus appeared smooth and normal except at the fundal region towards the right. There appeared to be scar tissue noted, a small band of tissue. I used the Myosure Reach to see if I could remove this tissue. I was able to remove some of the tissue, but it appeared to be flush with the fundal region of there uterus. I stopped at this point. Bilateral tubal openings were seen. The scant amount of tissue was sent to pathology. The hysteroscope was removed. At this point endometrial curettings were obtained by curettage. All quadrants were sampled,  and the tissue was submitted for pathologic exam. The hysteroscope was reinserted and the uterine cavity was visualized again.  At this point good hemostasis was noted and the hysteroscope was removed once again. Instruments were removed from vagina. No active bleeding was noted. Sponge and needle counts were correct X 2. She was taken to recovery in stable condition.    Lefty Arambula MD

## 2024-07-26 ENCOUNTER — MYC MEDICAL ADVICE (OUTPATIENT)
Dept: FAMILY MEDICINE | Facility: CLINIC | Age: 44
End: 2024-07-26

## 2024-07-26 ENCOUNTER — LAB (OUTPATIENT)
Dept: LAB | Facility: CLINIC | Age: 44
End: 2024-07-26
Payer: COMMERCIAL

## 2024-07-26 DIAGNOSIS — E11.9 TYPE 2 DIABETES MELLITUS WITHOUT COMPLICATION, WITHOUT LONG-TERM CURRENT USE OF INSULIN (H): ICD-10-CM

## 2024-07-26 DIAGNOSIS — Z11.4 SCREENING FOR HIV (HUMAN IMMUNODEFICIENCY VIRUS): Primary | ICD-10-CM

## 2024-07-26 DIAGNOSIS — Z11.59 NEED FOR HEPATITIS C SCREENING TEST: ICD-10-CM

## 2024-07-26 DIAGNOSIS — I10 MALIGNANT ESSENTIAL HYPERTENSION: ICD-10-CM

## 2024-07-26 LAB
ANION GAP SERPL CALCULATED.3IONS-SCNC: 11 MMOL/L (ref 7–15)
BUN SERPL-MCNC: 18.1 MG/DL (ref 6–20)
CALCIUM SERPL-MCNC: 9.4 MG/DL (ref 8.8–10.4)
CHLORIDE SERPL-SCNC: 101 MMOL/L (ref 98–107)
CREAT SERPL-MCNC: 1 MG/DL (ref 0.51–0.95)
EGFRCR SERPLBLD CKD-EPI 2021: 71 ML/MIN/1.73M2
GLUCOSE SERPL-MCNC: 149 MG/DL (ref 70–99)
HBA1C MFR BLD: 7.6 % (ref 0–5.6)
HCO3 SERPL-SCNC: 23 MMOL/L (ref 22–29)
HCV AB SERPL QL IA: NONREACTIVE
HIV 1+2 AB+HIV1 P24 AG SERPL QL IA: NONREACTIVE
POTASSIUM SERPL-SCNC: 4.1 MMOL/L (ref 3.4–5.3)
SODIUM SERPL-SCNC: 135 MMOL/L (ref 135–145)

## 2024-07-26 PROCEDURE — 80048 BASIC METABOLIC PNL TOTAL CA: CPT

## 2024-07-26 PROCEDURE — 36415 COLL VENOUS BLD VENIPUNCTURE: CPT

## 2024-07-26 PROCEDURE — 86803 HEPATITIS C AB TEST: CPT

## 2024-07-26 PROCEDURE — 87389 HIV-1 AG W/HIV-1&-2 AB AG IA: CPT

## 2024-07-26 PROCEDURE — 83036 HEMOGLOBIN GLYCOSYLATED A1C: CPT

## 2024-07-30 NOTE — TELEPHONE ENCOUNTER
Dr Cervantes,     Pt had blood work done with IVF doctor. Her TSH result was 4.05. IVF doctor would like to get pt's TSH down to 2. Our lab records from 1/4/24 show 1.02. Pt is currently taking Levothyroxine 75 mcg daily.  Last appt was with you on 7/17/24 and Charisse is primary but is out til 8/26/24.   Please advise.     Glo WELLINGTON RN  [facility]

## 2024-07-31 ENCOUNTER — TELEPHONE (OUTPATIENT)
Dept: ENDOCRINOLOGY | Facility: CLINIC | Age: 44
End: 2024-07-31
Payer: COMMERCIAL

## 2024-07-31 DIAGNOSIS — Z31.83 PATIENT UNDERGOING IN VITRO FERTILIZATION: ICD-10-CM

## 2024-07-31 DIAGNOSIS — E11.65 UNCONTROLLED TYPE 2 DIABETES MELLITUS WITH HYPERGLYCEMIA (H): Primary | ICD-10-CM

## 2024-07-31 DIAGNOSIS — E03.9 HYPOTHYROIDISM, UNSPECIFIED TYPE: Primary | ICD-10-CM

## 2024-07-31 RX ORDER — LEVOTHYROXINE SODIUM 88 UG/1
88 TABLET ORAL DAILY
Qty: 90 TABLET | Refills: 0 | Status: SHIPPED | OUTPATIENT
Start: 2024-07-31

## 2024-07-31 NOTE — TELEPHONE ENCOUNTER
[facility] Call Center    Phone Message    May a detailed message be left on voicemail: yes     Reason for Call: N/P - Referral for DM2, seeing Diab Ed for Insulin, also mentioned that her TSH is high but not on referral, she also is not pregnant at this time trying to undergo IVF, ref by: Charisse Padgett PA-C, records in Epic per patient, can you check if she needs to be seen sooner please via her records, thanks!       Action Taken: Other: Endo    Travel Screening: Not Applicable     Date of Service:

## 2024-07-31 NOTE — TELEPHONE ENCOUNTER
Okay, I stepped up her levothyroxine dose very slightly and put in an order for TSH to be done as a lab only in 6 weeks.

## 2024-07-31 NOTE — TELEPHONE ENCOUNTER
Per chart review, most recent referral is for Diabetes Education for Type 2 diabetes     Referral for Endocrinology was from November of 2023 for pregnancy and uncontrolled diabetes.     As per message from call center patient is stating she is not pregnant and also has thyroid issue. Wanting to be triaged for sooner appointment. Uncertain what patient is being referred for in Endocrine.     If patient is to also see Endocrine and not only Diabetes Education team, new referral for Endocrine needs to be placed with the diagnoses patient is being referred for. Once referral is placed, it will automatically be sent to our Endocrine triaging pool.     Writer will send message to primary care team to clarify.    Please reply to pool if needed.           Jaylyn Devlin, RN  Endocrine Care Coordinator  [facility]

## 2024-08-01 NOTE — TELEPHONE ENCOUNTER
Contacted patient - She is starting IVF in the near future, would still like to see endocrine as she transitions from metformin to insulin-management during this process.  Thyroid is being managed by primary clinic - endocrine referral not needed for this dx.  She is scheduled to see diabetic ed 8/2/24.    Patient referred to endocrine 11/2/23 as she was newly pregnant, instructed to stop metformin.  Pregnancy was not viable, patient resumed metformin in 11/2023.  Per 7/17/24 virtual visit with Dr. Cervantes: Currently on metformin, likely in good control though blood sugars lately have been a little higher, averaging around 1 40-1/51 thing in the morning. She was told by her infertility clinic that she should work to transition off of metformin and onto insulin. This visit is to start that. Will begin at low-dose basilar insulin, Lantus 7 units, have her follow-up with Guadalupe, who she is familiar with.     New referral to endocrine pended, please sign if appropriate. Routing to Dr. Cervantes (who pt saw recently) as well as [facility], as PCP is out.    Sandra Sanchez RN  [facility]

## 2024-08-01 NOTE — TELEPHONE ENCOUNTER
Contacted patient with message below from Dr Cervantes  Lab only appointment scheduled on 9/9/24  No further action needed    Uyen Laguerre RN  [facility]    Dr Billy Graf, I stepped up her levothyroxine dose very slightly and put in an order for TSH to be done as a lab only in 6 weeks.

## 2024-08-02 ENCOUNTER — VIRTUAL VISIT (OUTPATIENT)
Dept: EDUCATION SERVICES | Facility: CLINIC | Age: 44
End: 2024-08-02
Attending: FAMILY MEDICINE
Payer: COMMERCIAL

## 2024-08-02 DIAGNOSIS — E11.9 TYPE 2 DIABETES MELLITUS WITHOUT COMPLICATION, WITHOUT LONG-TERM CURRENT USE OF INSULIN (H): ICD-10-CM

## 2024-08-02 DIAGNOSIS — E11.65 UNCONTROLLED TYPE 2 DIABETES MELLITUS WITH HYPERGLYCEMIA (H): ICD-10-CM

## 2024-08-02 PROCEDURE — G0108 DIAB MANAGE TRN  PER INDIV: HCPCS | Mod: 95 | Performed by: DIETITIAN, REGISTERED

## 2024-08-02 PROCEDURE — A4245 ALCOHOL WIPES PER BOX: HCPCS | Mod: 95 | Performed by: DIETITIAN, REGISTERED

## 2024-08-02 NOTE — PATIENT INSTRUCTIONS
Inject Lantus 6 units once daily, once you start Lantus do not take Metformin.  Check your blood sugar once daily in the morning before eating, goal for BG is between .  If you feel shaky, sweaty or dizzy, check your blood sugar. If it is below 70, have 4 oz of fruit juice or regular soda, or 4 glucose tablets.  Recheck your blood sugar in 15 minutes, if it is still below 70, repeat the steps.  If your blood sugar goes below 70 two times in the same week, decrease your insulin to 4 units.   Resume exercise 30 minutes 3-4x/week when you are released to exercise after your procedure.  Add in frequent or time to aim for 150 minutes/week.  Check on when you will need your next diabetic eye exam.

## 2024-08-02 NOTE — Clinical Note
I met with Be today. Her infertility clinic trecommended that she go off metformin and use insulin.  Dr. Cervantes ordered lantus on 7/17 and we discussed how to do the injections today.  I recommended that once she start insulin, she hold her metformin.  I am not able to discontinue medications, so I  would need your approval.   Thank you, Guadalupe DEWEY

## 2024-08-02 NOTE — PROGRESS NOTES
Diabetes Self-Management Education & Support/ 43 minutes    Presents for: Follow-up    Type of service:  Video Visit    If the video visit is dropped, the video visit invitation should be resent by: Text to cell phone: [phone]    Originating Location (pt. Location): Home  Distant Location (provider location): [facility]  Mode of Communication:  Video Conference via [employer]    Video Start Time:  9:52  Video End Time (time video stopped): 10:36    How would patient like to obtain AVS? MyChart      ASSESSMENT:  Be has been taking metformin 2000 mg and testing FBS, averaging 140-150's. She has noticed an overall increase in her BG.  She reported to PCP on 7/17 that her infertility clinic recommended she go off metformin and use insulin as she is preparing for IVF.  PCP ordered Lantus and we reviewed insulin pen instructions today: site selection/rotation/expiration, dosing, s/s of hypoglycemia/treatment.  When patient starts insulin she will stop metformin. Be as a business trip coming up on 8/11, she is not sure if she will start the insulin before or after her trip. Be has been walking 30 minutes 3-4x/week. She is following an intermittent fasting plan with her window of eating between 11a-7p, which has worked well for her, weight has decreased. She is limiting portions of CHO containing foods, hydrating with water. She will be scheduling a dental cleaning, last eye exam 2023, she will check on making an appointment for 2024.      Patient's most recent   Lab Results   Component Value Date    A1C 7.6 07/26/2024     is meeting goal of <8.0    Diabetes knowledge and skills assessment:   Patient is knowledgeable in diabetes management concepts related to: Healthy Eating, Being Active, Monitoring, Taking Medication, and Reducing Risks    Continue education with the following diabetes management concepts: Monitoring and Taking Medication    Based on learning assessment above,  most appropriate setting for further diabetes education would be: Individual setting.      PLAN  Inject Lantus 6 units once daily, once you start Lantus do not take Metformin.  Check your blood sugar once daily in the morning before eating, goal for BG is between .  If you feel shaky, sweaty or dizzy, check your blood sugar. If it is below 70, have 4 oz of fruit juice or regular soda, or 4 glucose tablets.  Recheck your blood sugar in 15 minutes, if it is still below 70, repeat the steps.  If your blood sugar goes below 70 two times in the same week, decrease your insulin to 4 units.   Resume exercise 30 minutes 3-4x/week when you are released to exercise after your procedure.  Add in frequent or time to aim for 150 minutes/week.  Check on when you will need your next diabetic eye exam.     Topics to cover at upcoming visits: Monitoring and Taking Medication    Follow-up: three weeks after she starts insulin, patient will schedule appointment for follow up with Diabetes Ed    See Care Plan for co-developed, patient-state behavior change goals.  AVS provided for patient today.    Education Materials Provided:  No new materials provided today      SUBJECTIVE/OBJECTIVE:  Presents for: Follow-up  Accompanied by: Self  Diabetes education in the past 24mo: Yes  Focus of Visit: Monitoring, Taking Medication, Healthy Eating, Being Active  Diabetes type: Type 2  Disease course: Worsening  How confident are you filling out medical forms by yourself:: Quite a bit  Diabetes management related comments/concerns: Yes  Other concerns:: None  Cultural Influences/Ethnic Background:  Not  or       Diabetes Symptoms & Complications:  Weight trend: Stable  Symptom course: Stable  Disease course: Worsening       Patient Problem List and Family Medical History reviewed for relevant medical history, current medical status, and diabetes risk factors.    Vitals:  LMP  (LMP Unknown)   Estimated body mass index is 29.22  "kg/m  as calculated from the following:    Height as of 1/4/24: 1.49 m (4' 10.66\").    Weight as of 7/1/24: 64.9 kg (143 lb).   Last 3 BP:   BP Readings from Last 3 Encounters:   07/22/24 117/72   07/01/24 100/68   01/04/24 111/68       History   Smoking Status    Never   Smokeless Tobacco    Never       Labs:  Lab Results   Component Value Date    A1C 7.6 07/26/2024     Lab Results   Component Value Date     07/26/2024     07/22/2024     Lab Results   Component Value Date    LDL 93 01/04/2024    LDL 85 03/28/2013     Direct Measure HDL   Date Value Ref Range Status   01/04/2024 36 (L) >=50 mg/dL Final   ]  GFR Estimate   Date Value Ref Range Status   07/26/2024 71 >60 mL/min/1.73m2 Final     Comment:     eGFR calculated using 2021 CKD-EPI equation.   03/30/2021 56 (L) >60 mL/min/1.73m2 Final     GFR Estimate If Black   Date Value Ref Range Status   03/30/2021 >60 >60 mL/min/1.73m2 Final     Lab Results   Component Value Date    CR 1.00 07/26/2024     No results found for: \"MICROALBUMIN\"    Healthy Eating:  Cultural/[employer]vent diet restrictions?: No  How many times a week on average do you eat food made away from home (restaurant/take-out)?: 5+  Meals include: Lunch, Dinner, Morning Snack, Afternoon Snack  Lunch: quinoa salad quentin, tomatoes/lime juice, chicken or fish, or brown rice 1-2x/wk  Dinner: similiar to noon meal  Snacks: pretzels/hummus, one apple/day  Other: intermittent fasting 65 days: window of eating 11p-7p, fasting 7p-11am, this has been working, 146-137  Beverages: Water, Milk  Has patient met with a dietitian in the past?: Yes    Being Active:  Being Active Assessed Today: Yes  Exercise:: Yes (walking 2 miltes every other day, 30 minutes, 3-4x/week)  Days per week of moderate to strenuous exercise (like a brisk walk): 3  Barrier to exercise: Time    Monitoring:  Monitoring Assessed Today: Yes  Blood Glucose Meter: Accu-chek  Times checking blood sugar at home (number): 2  Times checking " blood sugar at home (per): Day    FBS: per patient averaging in the 140-150's    Taking Medications:  Diabetes Medication(s)       Biguanides       metFORMIN (GLUCOPHAGE XR) 500 MG 24 hr tablet Take 4 tablets (2,000 mg) by mouth daily       Insulin       insulin glargine (LANTUS PEN) 100 UNIT/ML pen Inject 7 Units subcutaneously at bedtime     Patient not taking: Reported on 8/2/2024            Current Treatments: Oral Medication (taken by mouth)    Problem Solving:                 Reducing Risks:  Has dilated eye exam at least once a year?: No  Sees dentist every 6 months?: No (needs to set up dental)  Feet checked by healthcare provider in the last year?: Yes    Healthy Coping:  Informal Support system:: Family, Spouse  Patient Activation Measure Survey Score:       No data to display                  Care Plan and Education Provided:  Healthy Eating: Balanced meals and Portion control, Being Active: Amount recommended (150 minutes moderate or 75 minutes vigorous activity and 2-3 days strength training per week), Monitoring: Frequency of monitoring and Individual glucose targets, Taking Medication: Insulin Instruction - Insulin administration technique taught today. Patient verbalized understanding and was able to perform an accurate return demonstration of administration technique., Problem Solving: Low glucose - causes, signs/symptoms, treatment and prevention and Rule of 15 and carrying a carbohydrate source at all times in case of low glucose, and Reducing Risks: Dental care, Eye care, and Goal for A1c, how it relates to glucose and how often to check        Time Spent: 43 minutes  Encounter Type: Individual    Any diabetes medication dose changes were made via the CDE Protocol per the patient's primary care provider. A copy of this encounter was shared with the provider.

## 2024-08-02 NOTE — LETTER
8/2/2024         RE: Be Alexis  [address]  [facility] [address]        Dear Colleague,    Thank you for referring your patient, Be Alexis, to the [facility]. Please see a copy of my visit note below.    Diabetes Self-Management Education & Support/ 43 minutes    Presents for: Follow-up    Type of service:  Video Visit    If the video visit is dropped, the video visit invitation should be resent by: Text to cell phone: [phone]    Originating Location (pt. Location): Home  Distant Location (provider location): [facility]  Mode of Communication:  Video Conference via [employer]    Video Start Time:  9:52  Video End Time (time video stopped): 10:36    How would patient like to obtain AVS? MyChart      ASSESSMENT:  Be has been taking metformin 2000 mg and testing FBS, averaging 140-150's. She has noticed an overall increase in her BG.  She reported to PCP on 7/17 that her infertility clinic recommended she go off metformin and use insulin as she is preparing for IVF.  PCP ordered Lantus and we reviewed insulin pen instructions today: site selection/rotation/expiration, dosing, s/s of hypoglycemia/treatment.  When patient starts insulin she will stop metformin. Be as a business trip coming up on 8/11, she is not sure if she will start the insulin before or after her trip. Be has been walking 30 minutes 3-4x/week. She is following an intermittent fasting plan with her window of eating between 11a-7p, which has worked well for her, weight has decreased. She is limiting portions of CHO containing foods, hydrating with water. She will be scheduling a dental cleaning, last eye exam 2023, she will check on making an appointment for 2024.      Patient's most recent   Lab Results   Component Value Date    A1C 7.6 07/26/2024     is meeting goal of <8.0    Diabetes knowledge and skills assessment:   Patient is knowledgeable in diabetes management  concepts related to: Healthy Eating, Being Active, Monitoring, Taking Medication, and Reducing Risks    Continue education with the following diabetes management concepts: Monitoring and Taking Medication    Based on learning assessment above, most appropriate setting for further diabetes education would be: Individual setting.      PLAN  Inject Lantus 6 units once daily, once you start Lantus do not take Metformin.  Check your blood sugar once daily in the morning before eating, goal for BG is between .  If you feel shaky, sweaty or dizzy, check your blood sugar. If it is below 70, have 4 oz of fruit juice or regular soda, or 4 glucose tablets.  Recheck your blood sugar in 15 minutes, if it is still below 70, repeat the steps.  If your blood sugar goes below 70 two times in the same week, decrease your insulin to 4 units.   Resume exercise 30 minutes 3-4x/week when you are released to exercise after your procedure.  Add in frequent or time to aim for 150 minutes/week.  Check on when you will need your next diabetic eye exam.     Topics to cover at upcoming visits: Monitoring and Taking Medication    Follow-up: three weeks after she starts insulin, patient will schedule appointment for follow up with Diabetes Ed    See Care Plan for co-developed, patient-state behavior change goals.  AVS provided for patient today.    Education Materials Provided:  No new materials provided today      SUBJECTIVE/OBJECTIVE:  Presents for: Follow-up  Accompanied by: Self  Diabetes education in the past 24mo: Yes  Focus of Visit: Monitoring, Taking Medication, Healthy Eating, Being Active  Diabetes type: Type 2  Disease course: Worsening  How confident are you filling out medical forms by yourself:: Quite a bit  Diabetes management related comments/concerns: Yes  Other concerns:: None  Cultural Influences/Ethnic Background:  Not  or       Diabetes Symptoms & Complications:  Weight trend: Stable  Symptom course:  "Stable  Disease course: Worsening       Patient Problem List and Family Medical History reviewed for relevant medical history, current medical status, and diabetes risk factors.    Vitals:  LMP  (LMP Unknown)   Estimated body mass index is 29.22 kg/m  as calculated from the following:    Height as of 1/4/24: 1.49 m (4' 10.66\").    Weight as of 7/1/24: 64.9 kg (143 lb).   Last 3 BP:   BP Readings from Last 3 Encounters:   07/22/24 117/72   07/01/24 100/68   01/04/24 111/68       History   Smoking Status     Never   Smokeless Tobacco     Never       Labs:  Lab Results   Component Value Date    A1C 7.6 07/26/2024     Lab Results   Component Value Date     07/26/2024     07/22/2024     Lab Results   Component Value Date    LDL 93 01/04/2024    LDL 85 03/28/2013     Direct Measure HDL   Date Value Ref Range Status   01/04/2024 36 (L) >=50 mg/dL Final   ]  GFR Estimate   Date Value Ref Range Status   07/26/2024 71 >60 mL/min/1.73m2 Final     Comment:     eGFR calculated using 2021 CKD-EPI equation.   03/30/2021 56 (L) >60 mL/min/1.73m2 Final     GFR Estimate If Black   Date Value Ref Range Status   03/30/2021 >60 >60 mL/min/1.73m2 Final     Lab Results   Component Value Date    CR 1.00 07/26/2024     No results found for: \"MICROALBUMIN\"    Healthy Eating:  Cultural/Anabaptism diet restrictions?: No  How many times a week on average do you eat food made away from home (restaurant/take-out)?: 5+  Meals include: Lunch, Dinner, Morning Snack, Afternoon Snack  Lunch: quinoa salad quentin, tomatoes/lime juice, chicken or fish, or brown rice 1-2x/wk  Dinner: similiar to noon meal  Snacks: pretzels/hummus, one apple/day  Other: intermittent fasting 65 days: window of eating 11p-7p, fasting 7p-11am, this has been working, 146-137  Beverages: Water, Milk  Has patient met with a dietitian in the past?: Yes    Being Active:  Being Active Assessed Today: Yes  Exercise:: Yes (walking 2 miltes every other day, 30 minutes, " 3-4x/week)  Days per week of moderate to strenuous exercise (like a brisk walk): 3  Barrier to exercise: Time    Monitoring:  Monitoring Assessed Today: Yes  Blood Glucose Meter: Accu-chek  Times checking blood sugar at home (number): 2  Times checking blood sugar at home (per): Day    FBS: per patient averaging in the 140-150's    Taking Medications:  Diabetes Medication(s)       Biguanides       metFORMIN (GLUCOPHAGE XR) 500 MG 24 hr tablet Take 4 tablets (2,000 mg) by mouth daily       Insulin       insulin glargine (LANTUS PEN) 100 UNIT/ML pen Inject 7 Units subcutaneously at bedtime     Patient not taking: Reported on 8/2/2024            Current Treatments: Oral Medication (taken by mouth)    Problem Solving:                 Reducing Risks:  Has dilated eye exam at least once a year?: No  Sees dentist every 6 months?: No (needs to set up dental)  Feet checked by healthcare provider in the last year?: Yes    Healthy Coping:  Informal Support system:: Family, Spouse  Patient Activation Measure Survey Score:       No data to display                  Care Plan and Education Provided:  Healthy Eating: Balanced meals and Portion control, Being Active: Amount recommended (150 minutes moderate or 75 minutes vigorous activity and 2-3 days strength training per week), Monitoring: Frequency of monitoring and Individual glucose targets, Taking Medication: Insulin Instruction - Insulin administration technique taught today. Patient verbalized understanding and was able to perform an accurate return demonstration of administration technique., Problem Solving: Low glucose - causes, signs/symptoms, treatment and prevention and Rule of 15 and carrying a carbohydrate source at all times in case of low glucose, and Reducing Risks: Dental care, Eye care, and Goal for A1c, how it relates to glucose and how often to check        Time Spent: 43 minutes  Encounter Type: Individual    Any diabetes medication dose changes were made via  the CDE Protocol per the patient's primary care provider. A copy of this encounter was shared with the provider.

## 2024-08-04 DIAGNOSIS — E11.65 UNCONTROLLED TYPE 2 DIABETES MELLITUS WITH HYPERGLYCEMIA (H): ICD-10-CM

## 2024-08-06 RX ORDER — LANCETS
EACH MISCELLANEOUS
Qty: 100 EACH | Refills: 1 | Status: SHIPPED | OUTPATIENT
Start: 2024-08-06 | End: 2024-09-30

## 2024-08-06 RX ORDER — BLOOD SUGAR DIAGNOSTIC
STRIP MISCELLANEOUS
Qty: 100 STRIP | Refills: 6 | Status: SHIPPED | OUTPATIENT
Start: 2024-08-06

## 2024-08-08 ENCOUNTER — TELEPHONE (OUTPATIENT)
Dept: EDUCATION SERVICES | Facility: CLINIC | Age: 44
End: 2024-08-08
Payer: COMMERCIAL

## 2024-08-08 DIAGNOSIS — E11.9 TYPE 2 DIABETES MELLITUS WITHOUT COMPLICATION, WITHOUT LONG-TERM CURRENT USE OF INSULIN (H): ICD-10-CM

## 2024-08-08 NOTE — TELEPHONE ENCOUNTER
Diabetes Education Follow-up    Subjective/Objective:    Be Alexis sent in blood glucose log for review. Last date of communication was: 8/2/24.    Diabetes is being managed with Diabetes Medications   Diabetes Medication(s)       Biguanides       metFORMIN (GLUCOPHAGE XR) 500 MG 24 hr tablet Take 4 tablets (2,000 mg) by mouth daily       Insulin       insulin glargine (LANTUS PEN) 100 UNIT/ML pen Inject 7 Units subcutaneously at bedtime     Patient not taking: Reported on 8/2/2024            BG/Food Log:   Date Breakfast  Lunch     Before After Before After   8/3  Stopped metformin 137      8/4 133      8/5 157  160    8/6 150      8/7 151  Apple, fried rice 200   8/8 180            Assessment:  Patient started 0-0-0-6 units of insulin which is 0.1u/kg.  Recommend increasing to 0.2u/kg to achieve targets closer to pregnancy goals of .  Additionally recommend doing at least 1 reading 2 hours after highest carbohydrate meal to determine if mealtime insulin is needed.  Patient reports she might go back to metformin, but not sure on timeline.    Wt Readings from Last 3 Encounters:   07/01/24 64.9 kg (143 lb)   01/04/24 62.6 kg (138 lb)   06/13/23 62.6 kg (138 lb)       Fasting blood glucose: 0% in target.  After breakfast glucose:-  Before lunch glucose: 0% in target.  After lunch glucose: 0% in target.    Plan/Response:  See Patient Instructions for co-developed, patient-stated behavior change goals.  Recommend increase to insulin - Lantus 0-0-0-6 --> 0-0-0-12  Call in Monday if blood sugars remain elevated.    Mayra Nagy MS, RD, LD, CDE      Any diabetes medication dose changes were made via the CDE Protocol and Collaborative Practice Agreement with the patient's primary care provider. A copy of this encounter was shared with the provider.

## 2024-08-08 NOTE — TELEPHONE ENCOUNTER
[facility] Call Center    Phone Message    May a detailed message be left on voicemail: yes     Reason for Call: Blood sugars are high ever since she started her Insulin on Saturday. From coming off the metformin.     Action Taken: Message routed to:  [facility]    Travel Screening: Not Applicable     Date of Service:

## 2024-08-17 DIAGNOSIS — L40.8 OTHER PSORIASIS: ICD-10-CM

## 2024-08-18 RX ORDER — TRIAMCINOLONE ACETONIDE 5 MG/G
OINTMENT TOPICAL
Qty: 30 G | Refills: 1 | Status: SHIPPED | OUTPATIENT
Start: 2024-08-18 | End: 2024-09-16

## 2024-08-19 ENCOUNTER — HOSPITAL ENCOUNTER (OUTPATIENT)
Dept: GENERAL RADIOLOGY | Facility: HOSPITAL | Age: 44
Discharge: HOME | End: 2024-08-19
Attending: NURSE PRACTITIONER
Payer: COMMERCIAL

## 2024-08-19 ENCOUNTER — OFFICE VISIT (OUTPATIENT)
Dept: FAMILY MEDICINE | Facility: CLINIC | Age: 44
End: 2024-08-19
Payer: COMMERCIAL

## 2024-08-19 VITALS
TEMPERATURE: 97.7 F | DIASTOLIC BLOOD PRESSURE: 88 MMHG | HEART RATE: 77 BPM | OXYGEN SATURATION: 98 % | RESPIRATION RATE: 16 BRPM | SYSTOLIC BLOOD PRESSURE: 133 MMHG

## 2024-08-19 DIAGNOSIS — M25.532 LEFT WRIST PAIN: ICD-10-CM

## 2024-08-19 DIAGNOSIS — M25.532 PAIN IN JOINT, FOREARM, LEFT: ICD-10-CM

## 2024-08-19 DIAGNOSIS — M25.521 ELBOW PAIN, RIGHT: ICD-10-CM

## 2024-08-19 DIAGNOSIS — S52.572A OTHER CLOSED INTRA-ARTICULAR FRACTURE OF DISTAL END OF LEFT RADIUS, INITIAL ENCOUNTER: Primary | ICD-10-CM

## 2024-08-19 PROCEDURE — 29125 APPL SHORT ARM SPLINT STATIC: CPT | Performed by: NURSE PRACTITIONER

## 2024-08-19 PROCEDURE — 73070 X-RAY EXAM OF ELBOW: CPT | Mod: RT

## 2024-08-19 PROCEDURE — 99214 OFFICE O/P EST MOD 30 MIN: CPT | Mod: 25 | Performed by: NURSE PRACTITIONER

## 2024-08-19 PROCEDURE — 73110 X-RAY EXAM OF WRIST: CPT | Mod: LT

## 2024-08-19 PROCEDURE — 73090 X-RAY EXAM OF FOREARM: CPT | Mod: LT

## 2024-08-19 NOTE — PATIENT INSTRUCTIONS
Call [facility] when you get home to follow-up later this week.  You broke your distal radius.     Keep your arm in the sling until you see orthopedics when you are up.  Rotate your shoulders frequently.  You can take off the sling at night to elevate your arm on a pillow if that is more comfortable and take it off if you want to when you are seated with a pillow under your arm.     Do not get your splint wet.  To take a shower, you will need to wrap it in plastic like Saran wrap and do your best to avoid having water hit that area.  Not soak your arm in water.    Remove splinting material (Ace wraps) if severe pain, numbness or purple fingers and seek care at an urgent care, ER, or orthopedic clinic immediately following.     Tylenol or ibuprofen as needed for discomfort.    I will call if your elbow is broken or any concerns with your elbow x-ray.  Otherwise watch for results on MyChart and avoid any activities such as heavy lifting that irritate the area.
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
## 2024-08-19 NOTE — LETTER
August 19, 2024      Be Alexis  [address] [facility] [address]        To Whom It May Concern:    Be Alexis  was seen on 8/19.  May return to work light duty tomorrow with no use of the left arm (eg typing, mouse, etc).        Sincerely,        Ana Rosa Goodwin, CNP

## 2024-08-20 ENCOUNTER — TRANSFERRED RECORDS (OUTPATIENT)
Dept: HEALTH INFORMATION MANAGEMENT | Facility: CLINIC | Age: 44
End: 2024-08-20
Payer: COMMERCIAL

## 2024-08-23 ENCOUNTER — MYC MEDICAL ADVICE (OUTPATIENT)
Dept: EDUCATION SERVICES | Facility: CLINIC | Age: 44
End: 2024-08-23
Payer: COMMERCIAL

## 2024-08-28 ENCOUNTER — TRANSFERRED RECORDS (OUTPATIENT)
Dept: HEALTH INFORMATION MANAGEMENT | Facility: CLINIC | Age: 44
End: 2024-08-28
Payer: COMMERCIAL

## 2024-09-04 DIAGNOSIS — E11.9 TYPE 2 DIABETES MELLITUS WITHOUT COMPLICATION, WITHOUT LONG-TERM CURRENT USE OF INSULIN (H): ICD-10-CM

## 2024-09-04 NOTE — TELEPHONE ENCOUNTER
Diabetes Education Follow-up/ 11 minutes    Subjective/Objective:    Follow up on insulin titration and BG review with Be Alexis. Last date of communication was: 8/2/24.    Diabetes is being managed with Lifestyle (diet/activity), Diabetes Medications   Diabetes Medication(s)       Biguanides       metFORMIN (GLUCOPHAGE XR) 500 MG 24 hr tablet Take 4 tablets (2,000 mg) by mouth daily       Insulin       insulin glargine (LANTUS PEN) 100 UNIT/ML pen Inject 12 Units subcutaneously at bedtime            BG/ Log:   FBS: 163,159,173  PP: 184,196,205         Assessment:  Be started Lantus 6 units after our last DM visit and stopped Metformin per recommendations of her infertility clinic.  Lantus was increased to 12 units due to elevated FBS.  Be is concerned that FBS still remain above goal of 130 mg/dl. We discussed continuing to increase Lantus 2 units every 3 days until her FBS are < 130 mg/dl. Be broke her wrist on a recent business trip and it has been painful, which may also be contributing to elevated BG.         Plan/Response:  See Patient Instructions for co-developed, patient-stated behavior change goals.  Continue to increase Lantus 2 units every three days until fasting blood sugar is below 130.  If blood sugar goes below 70, two times in the same week, decrease Lantus dose by 2 units.  If blood sugar goes below 70, have a small glass of fruit juice or regular soda. Recheck your blood sugar in 15 minutes to make sure it is above 70, if not, repeat the steps.         Any diabetes medication dose changes were made via the CDE Protocol and Collaborative Practice Agreement with the patient's primary care provider. A copy of this encounter was shared with the provider.

## 2024-09-09 ENCOUNTER — LAB (OUTPATIENT)
Dept: LAB | Facility: CLINIC | Age: 44
End: 2024-09-09
Payer: COMMERCIAL

## 2024-09-09 DIAGNOSIS — E03.9 HYPOTHYROIDISM, UNSPECIFIED TYPE: ICD-10-CM

## 2024-09-09 LAB — TSH SERPL DL<=0.005 MIU/L-ACNC: 1.16 UIU/ML (ref 0.3–4.2)

## 2024-09-09 PROCEDURE — 36415 COLL VENOUS BLD VENIPUNCTURE: CPT

## 2024-09-09 PROCEDURE — 84443 ASSAY THYROID STIM HORMONE: CPT

## 2024-09-16 DIAGNOSIS — L40.8 OTHER PSORIASIS: ICD-10-CM

## 2024-09-16 RX ORDER — TRIAMCINOLONE ACETONIDE 5 MG/G
OINTMENT TOPICAL
Qty: 30 G | Refills: 4 | Status: SHIPPED | OUTPATIENT
Start: 2024-09-16

## 2024-09-24 ENCOUNTER — MYC REFILL (OUTPATIENT)
Dept: CARDIOLOGY | Facility: CLINIC | Age: 44
End: 2024-09-24
Payer: COMMERCIAL

## 2024-09-24 DIAGNOSIS — I10 ESSENTIAL HYPERTENSION: ICD-10-CM

## 2024-09-24 DIAGNOSIS — I42.8 NONISCHEMIC CARDIOMYOPATHY (H): ICD-10-CM

## 2024-09-26 DIAGNOSIS — E11.65 UNCONTROLLED TYPE 2 DIABETES MELLITUS WITH HYPERGLYCEMIA (H): ICD-10-CM

## 2024-09-26 RX ORDER — CARVEDILOL 25 MG/1
25 TABLET ORAL 2 TIMES DAILY WITH MEALS
Qty: 180 TABLET | Refills: 2 | Status: SHIPPED | OUTPATIENT
Start: 2024-09-26

## 2024-09-27 ENCOUNTER — TRANSFERRED RECORDS (OUTPATIENT)
Dept: HEALTH INFORMATION MANAGEMENT | Facility: CLINIC | Age: 44
End: 2024-09-27
Payer: COMMERCIAL

## 2024-09-30 RX ORDER — LANCETS
EACH MISCELLANEOUS
Qty: 100 EACH | Refills: 3 | Status: SHIPPED | OUTPATIENT
Start: 2024-09-30

## 2024-10-17 ENCOUNTER — TELEPHONE (OUTPATIENT)
Dept: EDUCATION SERVICES | Facility: CLINIC | Age: 44
End: 2024-10-17
Payer: COMMERCIAL

## 2024-10-17 DIAGNOSIS — E11.9 TYPE 2 DIABETES MELLITUS WITHOUT COMPLICATION, WITHOUT LONG-TERM CURRENT USE OF INSULIN (H): ICD-10-CM

## 2024-10-17 NOTE — TELEPHONE ENCOUNTER
[facility] Call Center    Phone Message    May a detailed message be left on voicemail: yes     Reason for Call: Other: Pt is needing to speak to her provider regarding her insulin dosage as she is needing to increase it. Please advise.      Action Taken: Other: Diabetes Education     Travel Screening: Not Applicable     Date of Service: 10/17/24

## 2024-10-21 DIAGNOSIS — E06.3 HASHIMOTO'S THYROIDITIS: ICD-10-CM

## 2024-10-21 DIAGNOSIS — E03.9 HYPOTHYROIDISM, UNSPECIFIED TYPE: ICD-10-CM

## 2024-10-23 ENCOUNTER — VIRTUAL VISIT (OUTPATIENT)
Dept: EDUCATION SERVICES | Facility: CLINIC | Age: 44
End: 2024-10-23
Payer: COMMERCIAL

## 2024-10-23 DIAGNOSIS — E11.9 TYPE 2 DIABETES MELLITUS WITHOUT COMPLICATION, WITHOUT LONG-TERM CURRENT USE OF INSULIN (H): Primary | ICD-10-CM

## 2024-10-23 PROCEDURE — 98968 PH1 ASSMT&MGMT NQHP 21-30: CPT | Mod: 95 | Performed by: DIETITIAN, REGISTERED

## 2024-10-23 RX ORDER — ACYCLOVIR 400 MG/1
1 TABLET ORAL
Qty: 9 EACH | Refills: 5 | Status: SHIPPED | OUTPATIENT
Start: 2024-10-23

## 2024-10-23 NOTE — LETTER
10/23/2024         RE: Be Alexis  [address]  [facility] [address]        Dear Colleague,    Thank you for referring your patient, Be Alexis, to the [facility]. Please see a copy of my visit note below.    Diabetes Self-Management Education & Support/ 21 minutes    Presents for: Follow-up    Type of service:  Video Visit    If the video visit is dropped, the video visit invitation should be resent by: Text to cell phone: [phone]    Originating Location (pt. Location): Home  Distant Location (provider location): [facility]  Mode of Communication:  Video Conference via [employer]    Video Start Time:  9:37  Video End Time (time video stopped): 9:59    How would patient like to obtain AVS? MyChart      ASSESSMENT:  Be increased Lantus to 44 units, two days ago, due to elevated FBS. She has noticed the impact of rice on her BG, without rice, her FBS have been in the 80's. She is limiting rice to brown and only 1-2x/week. She is experimenting with other CHO sources with her meat and vegetables for meals, pretzels for snack. Be has been active with renovating a home with her spouse, daily after work. Her infertility doctor would like A1C lower before starting the embryo harvest.  Be will have her A1C rechecked next week. Be is UTD on eye and dental exams.     Patient's most recent   Lab Results   Component Value Date    A1C 7.6 07/26/2024     is meeting goal of <8.0    Diabetes knowledge and skills assessment:   Patient is knowledgeable in diabetes management concepts related to: Healthy Eating, Being Active, Monitoring, Taking Medication, and Reducing Risks    Continue education with the following diabetes management concepts: Monitoring and Taking Medication    Based on learning assessment above, most appropriate setting for further diabetes education would be: Individual setting.      PLAN  Inject Lantus 44 units once daily, increase 2  "units every three days until Fasting blood sugar is below 130.   Check your blood sugar once daily in the morning before eating, goal for BG is between  and two hours after one of your meals, goal is between . .  If you feel shaky, sweaty or dizzy, check your blood sugar. If it is below 70, have 4 oz of fruit juice or regular soda, or 4 glucose tablets.  Recheck your blood sugar in 15 minutes, if it is still below 70, repeat the steps.  If your blood sugar goes below 70 two times in the same week, decrease your insulin by 2 units.   Aim for 150 minutes/week for exercise: 30 minutes 5 times /week.   Check on when you will need your next diabetic eye exam.        Topics to cover at upcoming visits: Monitoring and Taking Medication    Follow-up: PRN    See Care Plan for co-developed, patient-state behavior change goals.  AVS provided for patient today.    Education Materials Provided:  No new materials provided today      SUBJECTIVE/OBJECTIVE:  Presents for: Follow-up  Accompanied by: Self  Diabetes education in the past 24mo: Yes  Focus of Visit: Monitoring, Taking Medication, Healthy Eating, Being Active  Diabetes type: Type 2  Disease course: Improving  How confident are you filling out medical forms by yourself:: Quite a bit  Diabetes management related comments/concerns: Yes  Transportation concerns: No  Other concerns:: None  Cultural Influences/Ethnic Background:  Not  or       Diabetes Symptoms & Complications:  Weight trend: Stable  Symptom course: Stable  Disease course: Improving       Patient Problem List and Family Medical History reviewed for relevant medical history, current medical status, and diabetes risk factors.    Vitals:  There were no vitals taken for this visit.  Estimated body mass index is 29.22 kg/m  as calculated from the following:    Height as of 1/4/24: 1.49 m (4' 10.66\").    Weight as of 7/1/24: 64.9 kg (143 lb).   Last 3 BP:   BP Readings from Last 3 " "Encounters:   08/19/24 133/88   07/22/24 117/72   07/01/24 100/68       History   Smoking Status     Never   Smokeless Tobacco     Never       Labs:  Lab Results   Component Value Date    A1C 7.6 07/26/2024     Lab Results   Component Value Date     07/26/2024     07/22/2024     Lab Results   Component Value Date    LDL 93 01/04/2024    LDL 85 03/28/2013     Direct Measure HDL   Date Value Ref Range Status   01/04/2024 36 (L) >=50 mg/dL Final   ]  GFR Estimate   Date Value Ref Range Status   07/26/2024 71 >60 mL/min/1.73m2 Final     Comment:     eGFR calculated using 2021 CKD-EPI equation.   03/30/2021 56 (L) >60 mL/min/1.73m2 Final     GFR Estimate If Black   Date Value Ref Range Status   03/30/2021 >60 >60 mL/min/1.73m2 Final     Lab Results   Component Value Date    CR 1.00 07/26/2024     No results found for: \"MICROALBUMIN\"    Healthy Eating:  Cultural/Denominational diet restrictions?: No  How many times a week on average do you eat food made away from home (restaurant/take-out)?: 5+  Meals include: Lunch, Dinner, Morning Snack, Afternoon Snack  Lunch: quinoa salad quentin, tomatoes/lime juice, chicken or fish, or brown rice 1-2x/wk  Dinner: similiar to noon meal  Snacks: pretzels/hummus, one apple/day  Other: intermittent fasting 65 days: window of eating 11p-7p, fasting 7p-11am, this has been working, 10/23/24: patient finding impact of rice on BG, limiting to 1-2x/week, brown rice only  Beverages: Water, Milk  Has patient met with a dietitian in the past?: Yes    Being Active:  Being Active Assessed Today: Yes  Exercise:: Yes (walking 2 miltes every other day, 30 minutes, 3-4x/week)  Days per week of moderate to strenuous exercise (like a brisk walk): 3  Barrier to exercise: Time    Monitoring:  Monitoring Assessed Today: Yes  Blood Glucose Meter: Accu-chek  Times checking blood sugar at home (number): 2  Times checking blood sugar at home (per): Day    FBS:157,131,121,143,124,86,88,137  Pre meal: " 97,106,83,94,110  Post am:197,181,193  Post dinner: 180,180,157,175,178    Taking Medications:  Diabetes Medication(s)       Biguanides       metFORMIN (GLUCOPHAGE XR) 500 MG 24 hr tablet Take 4 tablets (2,000 mg) by mouth daily       Insulin       insulin glargine (LANTUS PEN) 100 UNIT/ML pen Inject 42 Units subcutaneously at bedtime. Increase by 2 units every 3 days until your fasting blood sugar is below 130.   Max dose 50 units.            Current Treatments: Insulin Injections  Given by: Patient  Problems taking diabetes medications regularly?: No    Problem Solving:                 Reducing Risks:  Diabetes Risks: Ethnicity  Has dilated eye exam at least once a year?: Yes  Sees dentist every 6 months?: Yes (needs to set up dental)  Feet checked by healthcare provider in the last year?: Yes    Healthy Coping:  Emotional response to diabetes: Ready to learn  Informal Support system:: Family, Spouse  Stage of change: ACTION (Actively working towards change)  Patient Activation Measure Survey Score:       No data to display                  Care Plan and Education Provided:  Healthy Eating: Balanced meals and Carbohydrate Counting, Being Active: Amount recommended (150 minutes moderate or 75 minutes vigorous activity and 2-3 days strength training per week), Monitoring: Frequency of monitoring, Individual glucose targets, and Log and interpret results, Taking Medication: dose increase for insulin, Problem Solving: Low glucose - causes, signs/symptoms, treatment and prevention and Rule of 15 and carrying a carbohydrate source at all times in case of low glucose, and Reducing Risks: Dental care, Eye care, and Goal for A1c, how it relates to glucose and how often to check        Time Spent: 21 minutes  Encounter Type: Individual    Any diabetes medication dose changes were made via the CDE Protocol per the patient's primary care provider. A copy of this encounter was shared with the provider.

## 2024-10-23 NOTE — PROGRESS NOTES
Diabetes Self-Management Education & Support/ 21 minutes    Presents for: Follow-up    Type of service:  Video Visit    If the video visit is dropped, the video visit invitation should be resent by: Text to cell phone: [phone]    Originating Location (pt. Location): Home  Distant Location (provider location): [facility]  Mode of Communication:  Video Conference via [employer]    Video Start Time:  9:37  Video End Time (time video stopped): 9:59    How would patient like to obtain AVS? MyChart      ASSESSMENT:  Be increased Lantus to 44 units, two days ago, due to elevated FBS. She has noticed the impact of rice on her BG, without rice, her FBS have been in the 80's. She is limiting rice to brown and only 1-2x/week. She is experimenting with other CHO sources with her meat and vegetables for meals, pretzels for snack. Be has been active with renovating a home with her spouse, daily after work. Her infertility doctor would like A1C lower before starting the embryo harvest.  Be will have her A1C rechecked next week. Be is UTD on eye and dental exams.     Patient's most recent   Lab Results   Component Value Date    A1C 7.6 07/26/2024     is meeting goal of <8.0    Diabetes knowledge and skills assessment:   Patient is knowledgeable in diabetes management concepts related to: Healthy Eating, Being Active, Monitoring, Taking Medication, and Reducing Risks    Continue education with the following diabetes management concepts: Monitoring and Taking Medication    Based on learning assessment above, most appropriate setting for further diabetes education would be: Individual setting.      PLAN  Inject Lantus 44 units once daily, increase 2 units every three days until Fasting blood sugar is below 130.   Check your blood sugar once daily in the morning before eating, goal for BG is between  and two hours after one of your meals, goal is between . .  If you feel shaky,  "sweaty or dizzy, check your blood sugar. If it is below 70, have 4 oz of fruit juice or regular soda, or 4 glucose tablets.  Recheck your blood sugar in 15 minutes, if it is still below 70, repeat the steps.  If your blood sugar goes below 70 two times in the same week, decrease your insulin by 2 units.   Aim for 150 minutes/week for exercise: 30 minutes 5 times /week.   Check on when you will need your next diabetic eye exam.        Topics to cover at upcoming visits: Monitoring and Taking Medication    Follow-up: PRN    See Care Plan for co-developed, patient-state behavior change goals.  AVS provided for patient today.    Education Materials Provided:  No new materials provided today      SUBJECTIVE/OBJECTIVE:  Presents for: Follow-up  Accompanied by: Self  Diabetes education in the past 24mo: Yes  Focus of Visit: Monitoring, Taking Medication, Healthy Eating, Being Active  Diabetes type: Type 2  Disease course: Improving  How confident are you filling out medical forms by yourself:: Quite a bit  Diabetes management related comments/concerns: Yes  Transportation concerns: No  Other concerns:: None  Cultural Influences/Ethnic Background:  Not  or       Diabetes Symptoms & Complications:  Weight trend: Stable  Symptom course: Stable  Disease course: Improving       Patient Problem List and Family Medical History reviewed for relevant medical history, current medical status, and diabetes risk factors.    Vitals:  There were no vitals taken for this visit.  Estimated body mass index is 29.22 kg/m  as calculated from the following:    Height as of 1/4/24: 1.49 m (4' 10.66\").    Weight as of 7/1/24: 64.9 kg (143 lb).   Last 3 BP:   BP Readings from Last 3 Encounters:   08/19/24 133/88   07/22/24 117/72   07/01/24 100/68       History   Smoking Status    Never   Smokeless Tobacco    Never       Labs:  Lab Results   Component Value Date    A1C 7.6 07/26/2024     Lab Results   Component Value Date     " "07/26/2024     07/22/2024     Lab Results   Component Value Date    LDL 93 01/04/2024    LDL 85 03/28/2013     Direct Measure HDL   Date Value Ref Range Status   01/04/2024 36 (L) >=50 mg/dL Final   ]  GFR Estimate   Date Value Ref Range Status   07/26/2024 71 >60 mL/min/1.73m2 Final     Comment:     eGFR calculated using 2021 CKD-EPI equation.   03/30/2021 56 (L) >60 mL/min/1.73m2 Final     GFR Estimate If Black   Date Value Ref Range Status   03/30/2021 >60 >60 mL/min/1.73m2 Final     Lab Results   Component Value Date    CR 1.00 07/26/2024     No results found for: \"MICROALBUMIN\"    Healthy Eating:  Cultural/Rastafari diet restrictions?: No  How many times a week on average do you eat food made away from home (restaurant/take-out)?: 5+  Meals include: Lunch, Dinner, Morning Snack, Afternoon Snack  Lunch: quinoa salad quentin, tomatoes/lime juice, chicken or fish, or brown rice 1-2x/wk  Dinner: similiar to noon meal  Snacks: pretzels/hummus, one apple/day  Other: intermittent fasting 65 days: window of eating 11p-7p, fasting 7p-11am, this has been working, 10/23/24: patient finding impact of rice on BG, limiting to 1-2x/week, brown rice only  Beverages: Water, Milk  Has patient met with a dietitian in the past?: Yes    Being Active:  Being Active Assessed Today: Yes  Exercise:: Yes (walking 2 miltes every other day, 30 minutes, 3-4x/week)  Days per week of moderate to strenuous exercise (like a brisk walk): 3  Barrier to exercise: Time    Monitoring:  Monitoring Assessed Today: Yes  Blood Glucose Meter: Accu-chek  Times checking blood sugar at home (number): 2  Times checking blood sugar at home (per): Day    FBS:157,131,121,143,124,86,88,137  Pre meal: 97,106,83,94,110  Post am:197,181,193  Post dinner: 180,180,157,175,178    Taking Medications:  Diabetes Medication(s)       Biguanides       metFORMIN (GLUCOPHAGE XR) 500 MG 24 hr tablet Take 4 tablets (2,000 mg) by mouth daily       Insulin       insulin " glargine (LANTUS PEN) 100 UNIT/ML pen Inject 42 Units subcutaneously at bedtime. Increase by 2 units every 3 days until your fasting blood sugar is below 130.   Max dose 50 units.            Current Treatments: Insulin Injections  Given by: Patient  Problems taking diabetes medications regularly?: No    Problem Solving:                 Reducing Risks:  Diabetes Risks: Ethnicity  Has dilated eye exam at least once a year?: Yes  Sees dentist every 6 months?: Yes (needs to set up dental)  Feet checked by healthcare provider in the last year?: Yes    Healthy Coping:  Emotional response to diabetes: Ready to learn  Informal Support system:: Family, Spouse  Stage of change: ACTION (Actively working towards change)  Patient Activation Measure Survey Score:       No data to display                  Care Plan and Education Provided:  Healthy Eating: Balanced meals and Carbohydrate Counting, Being Active: Amount recommended (150 minutes moderate or 75 minutes vigorous activity and 2-3 days strength training per week), Monitoring: Frequency of monitoring, Individual glucose targets, and Log and interpret results, Taking Medication: dose increase for insulin, Problem Solving: Low glucose - causes, signs/symptoms, treatment and prevention and Rule of 15 and carrying a carbohydrate source at all times in case of low glucose, and Reducing Risks: Dental care, Eye care, and Goal for A1c, how it relates to glucose and how often to check        Time Spent: 21 minutes  Encounter Type: Individual    Any diabetes medication dose changes were made via the CDE Protocol per the patient's primary care provider. A copy of this encounter was shared with the provider.

## 2024-10-23 NOTE — PATIENT INSTRUCTIONS
Inject Lantus 44 units once daily, increase 2 units every three days until Fasting blood sugar is below 130.   Check your blood sugar once daily in the morning before eating, goal for BG is between  and two hours after one of your meals, goal is between . .  If you feel shaky, sweaty or dizzy, check your blood sugar. If it is below 70, have 4 oz of fruit juice or regular soda, or 4 glucose tablets.  Recheck your blood sugar in 15 minutes, if it is still below 70, repeat the steps.  If your blood sugar goes below 70 two times in the same week, decrease your insulin by 2 units.   Aim for 150 minutes/week for exercise: 30 minutes 5 times /week.   Check on when you will need your next diabetic eye exam.   Make lab appointment to check A1C the week of October 28th.

## 2024-10-27 ENCOUNTER — HEALTH MAINTENANCE LETTER (OUTPATIENT)
Age: 44
End: 2024-10-27

## 2024-10-27 DIAGNOSIS — E03.9 HYPOTHYROIDISM, UNSPECIFIED TYPE: ICD-10-CM

## 2024-10-27 NOTE — TELEPHONE ENCOUNTER
Clinic RN: Please contact patient because  Please see Charisse Padgett's request for follow up.    Charisse - just an FYI.  I work one day a week, so best to send follow up request to a pool.     Bcc Macronodular Histology Text: There were large aggregates of basaloid cells.

## 2024-10-28 NOTE — TELEPHONE ENCOUNTER
RN attempted to contact patient, but no answer. Left message on patient's voice mail to call clinic back.    If patient calls back, please clarify what dose of Levothyroxine she is on / taking.     Charisse ASHRAF Yes, please call patient to see which one she is taking.   If she cannot tell us, then I would call her pharmacy to see which one she has been refilling most recently.   Thanks.     This is a confusing one. I see two active orders in chart for this medication, with different doses. The one that is being requested 75 mcg should still have refills left, but the other order for 88 mcg is the most current order.  Do you want triage to call patient to see which she is taking?

## 2024-10-31 ENCOUNTER — MYC MEDICAL ADVICE (OUTPATIENT)
Dept: FAMILY MEDICINE | Facility: CLINIC | Age: 44
End: 2024-10-31
Payer: COMMERCIAL

## 2024-10-31 ENCOUNTER — TELEPHONE (OUTPATIENT)
Dept: FAMILY MEDICINE | Facility: CLINIC | Age: 44
End: 2024-10-31
Payer: COMMERCIAL

## 2024-10-31 RX ORDER — LEVOTHYROXINE SODIUM 88 UG/1
88 TABLET ORAL DAILY
Qty: 90 TABLET | Refills: 0 | Status: SHIPPED | OUTPATIENT
Start: 2024-10-31

## 2024-10-31 NOTE — TELEPHONE ENCOUNTER
Routed back to refill pool with clarification on dose.  LOBITO Nur, WAGNERN, RN (she/her)  [facility] RN

## 2024-10-31 NOTE — TELEPHONE ENCOUNTER
Pt confirms 88mcg current dose.  Re-routing to refill pool.    LOBITO Nur, WAGNERN, RN (she/her)  [facility] RN

## 2024-10-31 NOTE — TELEPHONE ENCOUNTER
Prior Authorization Not Needed per Insurance    Medication: DEXCOM G7 SENSOR MISC  Insurance Company: [employer] Non-Specialty PA's - Phone [phone] Fax [phone]  Expected CoPay: $    Pharmacy Filling the Rx: [facility] - [place], [place] - [address] [facility]  Pharmacy Notified: Yes  Patient Notified: Yes

## 2024-11-07 RX ORDER — LEVOTHYROXINE SODIUM 75 UG/1
75 TABLET ORAL DAILY
Qty: 90 TABLET | Refills: 3 | OUTPATIENT
Start: 2024-11-07

## 2024-11-07 NOTE — TELEPHONE ENCOUNTER
See 10/27 refill encounter and 10/31 MyChart encounter. Patient clarified correct dose and this has been sent.     Vianey Lepe RN  [facility]

## 2024-11-07 NOTE — PROGRESS NOTES
Outcome for 11/07/24 9:44 AM: [employer] message sent  Rosalia Hoffman MA  Outcome for 11/18/24 12:04 PM: Data obtained via Dexcom website  Bobbilynn Grossaint, VF      Patient is showing 3/5 MNCM met. Not on statin and Aspirin not prescribed   Bobbi Grossaint, VF

## 2024-11-08 ENCOUNTER — LAB (OUTPATIENT)
Dept: LAB | Facility: CLINIC | Age: 44
End: 2024-11-08
Payer: COMMERCIAL

## 2024-11-08 DIAGNOSIS — E03.9 HYPOTHYROIDISM, UNSPECIFIED TYPE: ICD-10-CM

## 2024-11-08 DIAGNOSIS — E11.9 TYPE 2 DIABETES MELLITUS WITHOUT COMPLICATION, WITHOUT LONG-TERM CURRENT USE OF INSULIN (H): ICD-10-CM

## 2024-11-08 LAB
EST. AVERAGE GLUCOSE BLD GHB EST-MCNC: 174 MG/DL
HBA1C MFR BLD: 7.7 % (ref 0–5.6)

## 2024-11-08 PROCEDURE — 36415 COLL VENOUS BLD VENIPUNCTURE: CPT

## 2024-11-08 PROCEDURE — 83036 HEMOGLOBIN GLYCOSYLATED A1C: CPT

## 2024-11-08 RX ORDER — LEVOTHYROXINE SODIUM 88 UG/1
88 TABLET ORAL DAILY
Qty: 90 TABLET | Refills: 0 | OUTPATIENT
Start: 2024-11-08

## 2024-11-18 NOTE — PATIENT INSTRUCTIONS
New Diabetes Patient Info  Welcome to the Diabetes Optimization Program at the Endocrinology and Diabetes Clinic at [facility] [place]!    Our Diabetes Optimization Program is here to provide you with a team-based, collaborative approach to optimize your diabetes management. The team is made up of Endocrinologists and Physician Assistants here at the Clinic, your Primary Care Physician, Certified Diabetes Educators, Registered Nurses, Medical Assistants, Emergency Medical Technicians and Visit Facilitators.     During your care with us, we promise to:   Work with you and your Primary Care Provider Charisse Padgett PA-CRef Provider (PCP)  to optimize your diabetes management.   Provide you with the tools and support you need to achieve a healthier lifestyle  Help you to control your diabetes by offering new treatments, education, and support to improve your diabetes management  Help you graduate back to your primary care provider for ongoing care once your diabetes is under control      Endocrinology Clinics Phone Number: [phone]    [facility] Address:   [place] Address:     [address] [place] [address] [id number] [place] [id number]

## 2024-11-20 ENCOUNTER — VIRTUAL VISIT (OUTPATIENT)
Dept: ENDOCRINOLOGY | Facility: CLINIC | Age: 44
End: 2024-11-20
Payer: COMMERCIAL

## 2024-11-20 DIAGNOSIS — E06.3 HYPOTHYROIDISM DUE TO HASHIMOTO THYROIDITIS: Primary | ICD-10-CM

## 2024-11-20 DIAGNOSIS — Z31.83 PATIENT UNDERGOING IN VITRO FERTILIZATION: ICD-10-CM

## 2024-11-20 DIAGNOSIS — E11.65 UNCONTROLLED TYPE 2 DIABETES MELLITUS WITH HYPERGLYCEMIA (H): ICD-10-CM

## 2024-11-20 ASSESSMENT — PAIN SCALES - GENERAL: PAINLEVEL_OUTOF10: NO PAIN (0)

## 2024-11-20 NOTE — PROGRESS NOTES
Virtual Visit Details    Type of service:  Video Visit     Originating Location (pt. Location): Home    Distant Location (provider location):  Off-site  Platform used for Video Visit: [employer]  Assessment/Plan :   Type 2 DM. Be is doing well. We reviewed her recent CGMS report and, overall, her blood sugars look good. We discussed fasting and post-prandial goals during pregnancy. Her current fasting blood sugars look great but she is still experiencing some post-prandial spikes. She has been working to minimize carb intake, which is okay, but will not be as much of an option during pregnancy. We discussed how pregnancy affects blood sugars and how it impacts insulin resistance. For now, we will continue with Lantus 46 unit(s) and she will continue to work on making healthy dietary decisions. I will send a message to Guadalupe Hung RD to discuss the possibility of starting meal time insulin, if her numbers continue to spike. We also briefly discussed insulin pump therapy. She is going to need MDI therapy during pregnancy and insulin pumps can make management easier. I encouraged Be to keep up the good work and we will follow-up in 4 mos.   Hypothyroidism. We discussed the importance of keeping her TSH within a healthy level during pregnancy. We also reviewed her current laboratory results. Her levels look great. She will continue with 88 mcg daily.    Due to the COVID 19 pandemic this visit was a telephone/video visit in order to help prevent spread of infection in this patient and the general population. The patient gave verbal consent for the visit today. I have independently reviewed and interpreted labs, imaging as indicated.       Distant Location (provider location):  Off-site  Mode of Communication:  Video Conference via [email or web address]  Chart review/prep time 10    Visit Start time 1:58  Visit Stop time 2:26  40 minutes spent on the date of the encounter doing chart review, history and exam, documentation  and further activities as noted above.      Chief complaint:  Be is a 44 year old female seen in consultation at the request of TOBI Charles for Type 2 DM and hypothyroidism.    I have reviewed Care Everywhere including [facility], [facility], [facility], [facility] , [facility], [place] lab reports, imaging reports and provider notes as indicated.      HISTORY OF PRESENT ILLNESS  Be was diagnosed with diabetes about 2 yrs ago. A review of her chart finds that her hemoglobin A1C was 13.3% at the time of diagnosis. She was started on metformin at the time of diagnosis and she worked hard to make healthy lifestyle changes. After 3 mos, her hemoglobin A1C had improved to 7.4%. Recently, she switched from metformin to Lantus. She is working closely with a fertility clinic and she would like to try for conception. She is ready to go with IVF, they have a plan and medications ready, as soon as her hemoglobin A1C is at a more healthy level. She was told that it needs to be less than 7% and ideally less than 6%.     At present time, she is taking 46 unit(s) of Lantus daily. She has been on 46 unit(s) for the last week. She is also using the Dexcom G7 sensor to closely monitor her blood sugars. She has been using the CGMS data to make healthy dietary decisions. She has been trying to keep her blood sugars within target range throughout the day with a combination of healthy eating and exercise. She has not had any recent episodes of severe hyperglycemia and/or hypoglycemia. She also has not had any issues with blurred vision or an increase in numbness/tingling in her feet.     Be has a complicated medical history that includes a history of heart failure with reduced ejection fraction, HTN, hyperlipidemia, and hypothyroidism. She is currently taking 88 mcg of levothyroxine daily. Her thyroid levels are also being monitored closely by her primary care provider.     Endocrine  relevant labs are as follows:   Latest Reference Range & Units 11/08/24 10:30   Hemoglobin A1C 0.0 - 5.6 % 7.7 (H)   (H): Data is abnormally high   Latest Reference Range & Units 09/09/24 09:45   TSH 0.30 - 4.20 uIU/mL 1.16      Latest Reference Range & Units 07/26/24 09:10   Hemoglobin A1C 0.0 - 5.6 % 7.6 (H)   (H): Data is abnormally high   Latest Reference Range & Units 01/04/24 11:15   Albumin Urine mg/L mg/L <12.0      Latest Reference Range & Units 01/04/24 11:12   Hemoglobin A1C 0.0 - 5.6 % 6.6 (H)   (H): Data is abnormally high    REVIEW OF SYSTEMS    Endocrine: positive for thyroid disorder and diabetes  Skin: negative  Eyes: negative for, visual blurring, redness, tearing  Ears/Nose/Throat: negative for, hoarseness, feeling of fullness  Respiratory: No shortness of breath, dyspnea on exertion, cough, or hemoptysis  Cardiovascular: negative for, chest pain, dyspnea on exertion, lower extremity edema, and exercise intolerance  Gastrointestinal: negative for, nausea, vomiting, constipation, and diarrhea  Genitourinary: negative for, nocturia, dysuria, frequency, and urgency  Musculoskeletal: negative for, muscular weakness, nocturnal cramping, and foot pain  Neurologic: negative for, local weakness, numbness or tingling of hands, and numbness or tingling of feet  Psychiatric: negative  Hematologic/Lymphatic/Immunologic: negative    Past Medical History  Past Medical History:   Diagnosis Date    Anemia     severe, Hbg 7.2 in 1/2016    CHF (congestive heart failure) (H)     Endometrial disorder 01/01/2016    pelvic u/s showed heterogenous stripe; getting it biopsied in 2/2016    HLD (hyperlipidemia)     Hypothyroid     as of 1/2016 bumped up to 50mcg per day    Iron deficiency anemia 04/06/2017    Leiomyoma of uterus 04/13/2017    Overweight     Psoriasis     Type 2 diabetes mellitus (H)        Medications  Current Outpatient Medications   Medication Sig Dispense Refill    ACCU-CHEK GUIDE test strip CHECK BLOOD  SUGAR TWICE DAILY 100 strip 6    acetaminophen (TYLENOL) 325 MG tablet Take 3 tablets (975 mg) by mouth every 6 hours as needed for mild pain      blood glucose monitoring (SOFTCLIX) lancets TEST TWICE DAILY 100 each 3    Blood Glucose Monitoring Suppl (ACCU-CHEK GUIDE ME) w/Device KIT 1 each as needed 1 kit 0    carvedilol (COREG) 25 MG tablet Take 1 tablet (25 mg) by mouth 2 times daily (with meals). 180 tablet 2    Continuous Glucose Sensor (DEXCOM G7 SENSOR) MISC 1 each every 10 days. Change sensor every 10 days 9 each 5    hydrALAZINE (APRESOLINE) 25 MG tablet Take 1 tablet (25 mg) by mouth 2 times daily 180 tablet 3    ibuprofen (ADVIL/MOTRIN) 800 MG tablet Take 1 tablet (800 mg) by mouth every 6 hours as needed for other (mild and/or inflammatory pain)      insulin glargine (LANTUS PEN) 100 UNIT/ML pen Inject 42 Units subcutaneously at bedtime. Increase by 2 units every 3 days until your fasting blood sugar is below 130.   Max dose 50 units. 45 mL 2    insulin pen needle (32G X 4 MM) 32G X 4 MM miscellaneous Use 4 pen needles daily or as directed. 100 each 2    levothyroxine (SYNTHROID/LEVOTHROID) 88 MCG tablet TAKE 1 TABLET(88 MCG) BY MOUTH DAILY 90 tablet 0    multivitamin with minerals (THERA-M) 9 mg iron-400 mcg Tab tablet [MULTIVITAMIN WITH MINERALS (THERA-M) 9 MG IRON-400 MCG TAB TABLET] Take 1 tablet by mouth daily.  0    triamcinolone (KENALOG) 0.5 % external ointment APPLY TOPICALLY TO THE AFFECTED AREA DAILY AS NEEDED 30 g 4       Allergies  Allergies   Allergen Reactions    Lisinopril Cough       Family History  family history includes No Known Problems in her father and mother.    Social History  Social History     Tobacco Use    Smoking status: Never    Smokeless tobacco: Never   Substance Use Topics    Alcohol use: No    Drug use: No       Physical Exam  There is no height or weight on file to calculate BMI.  GENERAL: no distress  SKIN: Visible skin clear. No significant rash, abnormal  pigmentation or lesions.  EYES: Eyes grossly normal to inspection.  No discharge or erythema, or obvious scleral/conjunctival abnormalities.  NECK: visible goiter is not present; no visible neck masses  RESP: No audible wheeze, cough, or visible cyanosis.  No visible retractions or increased work of breathing.    NEURO: Awake, alert, responds appropriately to questions.  Mentation and speech fluent.  PSYCH:affect normal and appearance well-groomed.      DATA REVIEW  Dexcom Clarity   Time in target range 85%  High 11%, Very High 4%  Current Ave  mg/dl

## 2024-11-20 NOTE — LETTER
11/20/2024      Be Alexis  [address]  [facility] [address]      Dear Colleague,    Thank you for referring your patient, Be Alexis, to the [facility]. Please see a copy of my visit note below.    Outcome for 11/07/24 9:44 AM: Built In message sent  Rosalia Hoffman MA  Outcome for 11/18/24 12:04 PM: Data obtained via Dexcom website  Bobbilynn Grossaint, VF      Patient is showing 3/5 MNCM met. Not on statin and Aspirin not prescribed   Bobbi Grossaint, VF                      Virtual Visit Details    Type of service:  Video Visit     Originating Location (pt. Location): Home  {PROVIDER LOCATION On-site should be selected for visits conducted from your clinic location or adjoining [facility] hospital, academic office, or other nearby [facility] building. Off-site should be selected for all other provider locations, including home:[id number]}  Distant Location (provider location):  Off-site  Platform used for Video Visit: [email or web address]  Assessment/Plan :   Type 2 DM. Be is doing well. We reviewed her recent CGMS report and, overall, her blood sugars look good. We discussed fasting and post-prandial goals during pregnancy. Her current fasting blood sugars look great but she is still experiencing some post-prandial spikes. She has been working to minimize carb intake, which is okay, but will not be as much of an option during pregnancy. We discussed how pregnancy affects blood sugars and how it impacts insulin resistance. For now, we will continue with Lantus 46 unit(s) and she will continue to work on making healthy dietary decisions. I will send a message to Guadalupe Hung RD to discuss the possibility of starting meal time insulin, if her numbers continue to spike. We also briefly discussed insulin pump therapy. She is going to need MDI therapy during pregnancy and insulin pumps can make management easier. I encouraged Be to keep up the good work and we will follow-up in 4 mos.   Hypothyroidism.  We discussed the importance of keeping her TSH within a healthy level during pregnancy. We also reviewed her current laboratory results. Her levels look great. She will continue with 88 mcg daily.    Due to the COVID 19 pandemic this visit was a telephone/video visit in order to help prevent spread of infection in this patient and the general population. The patient gave verbal consent for the visit today. I have independently reviewed and interpreted labs, imaging as indicated.     {PROVIDER LOCATION On-site should be selected for visits conducted from your clinic location or adjoining [facility] hospital, academic office, or other nearby [facility] building. Off-site should be selected for all other provider locations, including home:[id number]}  Distant Location (provider location):  Off-site  Mode of Communication:  Video Conference via [employer]  Chart review/prep time 10    Visit Start time 1:58  Visit Stop time 2:26  40 minutes spent on the date of the encounter doing chart review, history and exam, documentation and further activities as noted above.      Chief complaint:  Be is a 44 year old female seen in consultation at the request of TOBI Charles for Type 2 DM and hypothyroidism.    I have reviewed Care Everywhere including [facility], [facility], [facility], [facility] , [facility], [place] lab reports, imaging reports and provider notes as indicated.      HISTORY OF PRESENT ILLNESS  Be was diagnosed with diabetes about 2 yrs ago. A review of her chart finds that her hemoglobin A1C was 13.3% at the time of diagnosis. She was started on metformin at the time of diagnosis and she worked hard to make healthy lifestyle changes. After 3 mos, her hemoglobin A1C had improved to 7.4%. Recently, she switched from metformin to Lantus. She is working closely with a fertility clinic and she would like to try for conception. She is ready to go with IVF, they have a plan and  medications ready, as soon as her hemoglobin A1C is at a more healthy level. She was told that it needs to be less than 7% and ideally less than 6%.     At present time, she is taking 46 unit(s) of Lantus daily. She has been on 46 unit(s) for the last week. She is also using the Dexcom G7 sensor to closely monitor her blood sugars. She has been using the CGMS data to make healthy dietary decisions. She has been trying to keep her blood sugars within target range throughout the day with a combination of healthy eating and exercise. She has not had any recent episodes of severe hyperglycemia and/or hypoglycemia. She also has not had any issues with blurred vision or an increase in numbness/tingling in her feet.     Be has a complicated medical history that includes a history of heart failure with reduced ejection fraction, HTN, hyperlipidemia, and hypothyroidism. She is currently taking 88 mcg of levothyroxine daily. Her thyroid levels are also being monitored closely by her primary care provider.     Endocrine relevant labs are as follows:   Latest Reference Range & Units 11/08/24 10:30   Hemoglobin A1C 0.0 - 5.6 % 7.7 (H)   (H): Data is abnormally high   Latest Reference Range & Units 09/09/24 09:45   TSH 0.30 - 4.20 uIU/mL 1.16      Latest Reference Range & Units 07/26/24 09:10   Hemoglobin A1C 0.0 - 5.6 % 7.6 (H)   (H): Data is abnormally high   Latest Reference Range & Units 01/04/24 11:15   Albumin Urine mg/L mg/L <12.0      Latest Reference Range & Units 01/04/24 11:12   Hemoglobin A1C 0.0 - 5.6 % 6.6 (H)   (H): Data is abnormally high    REVIEW OF SYSTEMS    Endocrine: positive for thyroid disorder and diabetes  Skin: negative  Eyes: negative for, visual blurring, redness, tearing  Ears/Nose/Throat: negative for, hoarseness, feeling of fullness  Respiratory: No shortness of breath, dyspnea on exertion, cough, or hemoptysis  Cardiovascular: negative for, chest pain, dyspnea on exertion, lower extremity  edema, and exercise intolerance  Gastrointestinal: negative for, nausea, vomiting, constipation, and diarrhea  Genitourinary: negative for, nocturia, dysuria, frequency, and urgency  Musculoskeletal: negative for, muscular weakness, nocturnal cramping, and foot pain  Neurologic: negative for, local weakness, numbness or tingling of hands, and numbness or tingling of feet  Psychiatric: negative  Hematologic/Lymphatic/Immunologic: negative    Past Medical History  Past Medical History:   Diagnosis Date     Anemia     severe, Hbg 7.2 in 1/2016     CHF (congestive heart failure) (H)      Endometrial disorder 01/01/2016    pelvic u/s showed heterogenous stripe; getting it biopsied in 2/2016     HLD (hyperlipidemia)      Hypothyroid     as of 1/2016 bumped up to 50mcg per day     Iron deficiency anemia 04/06/2017     Leiomyoma of uterus 04/13/2017     Overweight      Psoriasis      Type 2 diabetes mellitus (H)        Medications  Current Outpatient Medications   Medication Sig Dispense Refill     ACCU-CHEK GUIDE test strip CHECK BLOOD SUGAR TWICE DAILY 100 strip 6     acetaminophen (TYLENOL) 325 MG tablet Take 3 tablets (975 mg) by mouth every 6 hours as needed for mild pain       blood glucose monitoring (SOFTCLIX) lancets TEST TWICE DAILY 100 each 3     Blood Glucose Monitoring Suppl (ACCU-CHEK GUIDE ME) w/Device KIT 1 each as needed 1 kit 0     carvedilol (COREG) 25 MG tablet Take 1 tablet (25 mg) by mouth 2 times daily (with meals). 180 tablet 2     Continuous Glucose Sensor (DEXCOM G7 SENSOR) MISC 1 each every 10 days. Change sensor every 10 days 9 each 5     hydrALAZINE (APRESOLINE) 25 MG tablet Take 1 tablet (25 mg) by mouth 2 times daily 180 tablet 3     ibuprofen (ADVIL/MOTRIN) 800 MG tablet Take 1 tablet (800 mg) by mouth every 6 hours as needed for other (mild and/or inflammatory pain)       insulin glargine (LANTUS PEN) 100 UNIT/ML pen Inject 42 Units subcutaneously at bedtime. Increase by 2 units every 3 days  until your fasting blood sugar is below 130.   Max dose 50 units. 45 mL 2     insulin pen needle (32G X 4 MM) 32G X 4 MM miscellaneous Use 4 pen needles daily or as directed. 100 each 2     levothyroxine (SYNTHROID/LEVOTHROID) 88 MCG tablet TAKE 1 TABLET(88 MCG) BY MOUTH DAILY 90 tablet 0     multivitamin with minerals (THERA-M) 9 mg iron-400 mcg Tab tablet [MULTIVITAMIN WITH MINERALS (THERA-M) 9 MG IRON-400 MCG TAB TABLET] Take 1 tablet by mouth daily.  0     triamcinolone (KENALOG) 0.5 % external ointment APPLY TOPICALLY TO THE AFFECTED AREA DAILY AS NEEDED 30 g 4       Allergies  Allergies   Allergen Reactions     Lisinopril Cough       Family History  family history includes No Known Problems in her father and mother.    Social History  Social History     Tobacco Use     Smoking status: Never     Smokeless tobacco: Never   Substance Use Topics     Alcohol use: No     Drug use: No       Physical Exam  There is no height or weight on file to calculate BMI.  GENERAL: no distress  SKIN: Visible skin clear. No significant rash, abnormal pigmentation or lesions.  EYES: Eyes grossly normal to inspection.  No discharge or erythema, or obvious scleral/conjunctival abnormalities.  NECK: visible goiter is not present; no visible neck masses  RESP: No audible wheeze, cough, or visible cyanosis.  No visible retractions or increased work of breathing.    NEURO: Awake, alert, responds appropriately to questions.  Mentation and speech fluent.  PSYCH:affect normal and appearance well-groomed.      DATA REVIEW  Dexcom Clarity   Time in target range 85%  High 11%, Very High 4%  Current Ave  mg/dl        Again, thank you for allowing me to participate in the care of your patient.        Sincerely,        Parris Milligan PA-C

## 2024-11-20 NOTE — NURSING NOTE
Current patient location: Home    Is the patient currently in the state of [place]? YES    Visit mode:VIDEO    If the visit is dropped, the patient can be reconnected by:VIDEO VISIT: Text to cell phone:   Telephone Information:   Mobile [phone]       Will anyone else be joining the visit? NO  (If patient encounters technical issues they should call [phone])    Are changes needed to the allergy or medication list? No, verified at e-check in    Are refills needed on medications prescribed by this physician? NO    Rooming Documentation:  Not applicable    Reason for visit: Consult (NEW DIABETES )    Judie THURSTON

## 2025-02-14 PROBLEM — E11.65 TYPE 2 DIABETES MELLITUS WITH HYPERGLYCEMIA, WITH LONG-TERM CURRENT USE OF INSULIN (H): Status: ACTIVE | Noted: 2024-07-17

## 2025-02-14 PROBLEM — Z79.4 TYPE 2 DIABETES MELLITUS WITH HYPERGLYCEMIA, WITH LONG-TERM CURRENT USE OF INSULIN (H): Status: ACTIVE | Noted: 2024-07-17

## 2025-02-25 ENCOUNTER — VIRTUAL VISIT (OUTPATIENT)
Dept: EDUCATION SERVICES | Facility: CLINIC | Age: 45
End: 2025-02-25
Payer: COMMERCIAL

## 2025-02-25 DIAGNOSIS — E11.9 TYPE 2 DIABETES MELLITUS WITHOUT COMPLICATION, WITHOUT LONG-TERM CURRENT USE OF INSULIN (H): ICD-10-CM

## 2025-02-25 PROCEDURE — G0108 DIAB MANAGE TRN  PER INDIV: HCPCS | Mod: 93 | Performed by: DIETITIAN, REGISTERED

## 2025-02-25 RX ORDER — BLOOD PRESSURE TEST KIT
1 KIT MISCELLANEOUS 4 TIMES DAILY
Qty: 120 EACH | Refills: 3 | Status: SHIPPED | OUTPATIENT
Start: 2025-02-25

## 2025-02-25 RX ORDER — INSULIN LISPRO 100 [IU]/ML
3 INJECTION, SOLUTION INTRAVENOUS; SUBCUTANEOUS
Qty: 15 ML | Refills: 2 | Status: SHIPPED | OUTPATIENT
Start: 2025-02-25

## 2025-02-25 NOTE — PROGRESS NOTES
Diabetes Self-Management Education & Support    Presents for:      Type of Service: Telephone Visit    Originating Location (Patient Location): Home  Distant Location (Provider Location): Offsite  Mode of Communication:  Telephone    Telephone Visit Start Time:  1:59  Telephone Visit End Time (telephone visit stop time): 2:29    How would patient like to obtain AVS? MyChart      Assessment  Be is currently injecting lantus 50 units in the PM, she is using the Dexcom G7 with recent data from the last 14 days indicating average BG of 175 mg/dl with 64% TIR.  Be is planning for IVF and her PCP for IVF would like her A1C to be at 6.5% or below. Be expressed frustration with not being able to lower her A1C to goal and feeling like it is hard to manage her BG, even though she has been making nutritional changes. Be is not currently exercising, she and her spouse have been working on a home remodel for several months and hope to move into their now home in the next few weeks. She feels like her stress will decrease after they move, with work and remodeling she has not had time to add in exercise.         Patient's most recent   Lab Results   Component Value Date    A1C 7.3 02/14/2025     is meeting goal of <8.0    Diabetes knowledge and skills assessment:   Patient is knowledgeable in diabetes management concepts related to: Healthy Eating, Being Active, Monitoring, and Taking Medication    Based on learning assessment above, most appropriate setting for further diabetes education would be: Individual setting.    Care Plan and Education Provided:  Healthy Eating: Carbohydrate Counting and limiting sugary beverages, Being Active: adding in exercise , Monitoring: Individual glucose targets, Taking Medication: Action of prescribed medication(s) and When to take medication(s), Problem Solving: Low glucose - causes, signs/symptoms, treatment and prevention and Rule of 15 and carrying a carbohydrate source at all  times in case of low glucose, Reducing Risks: Goal for A1c, how it relates to glucose and how often to check, and Healthy Coping: Benefits of making appropriate lifestyle changes    Patient verbalized understanding of diabetes self-management education concepts discussed, opportunities for ongoing education and support, and recommendations provided today.      Patient Instructions   Inject 3 units of meal time insulin, 10-15 minutes before the start of each meal.  If you skip a meal, do not inject the meal time insulin.   Decrease lantus to 41 units when you inject meal time insulin, 3 units three times daily.   If your blood sugar drops below 70, have a small glass of juice, recheck your blood sugar in 15 minutes to make sure it is above 70, if not, repeat the steps.   Document your fasting blood sugar and two hours after each meal in your Dexcom G7 winsome.  Limit carbohydrates to no more than 45 grams per meal and 15 grams per snack.   Limit juice portion.  Add in 10-15 minutes of exercise after meals.   Schedule a diabetic eye exam if you have not had one completed in the last 12 months.        Diabetes educator will assess BG in one week to make insulin adjustments if needed.     Topics to cover at upcoming visits: Monitoring and Taking Medication    Follow-up:  Upcoming Diabetes Ed Appointments     Visit Type Date Time Department    DIABETES ED 2/25/2025  2:00 PM [facility] DIABETES EDUCATION        See Care Plan for co-developed, patient-state behavior change goals.    Education Materials Provided:  No new materials provided today      Subjective/Objective  Be is an 44 year old year old, presenting for the following diabetes education related to: Diabetes education in the past 24mo: (Patient-Rptd) Yes  Diabetes type: (Patient-Rptd) Type 2  Disease course: (Patient-Rptd) Improving  How confident are you filling out medical forms by yourself:: (Patient-Rptd) Extremely  Cultural Influences/Ethnic Background:  Not  " or       Diabetes Symptoms & Complications:  Diabetes Related Symptoms: (Patient-Rptd) None  Weight trend: (Patient-Rptd) Increasing  Symptom course: (Patient-Rptd) Stable  Disease course: (Patient-Rptd) Improving       Patient Problem List and Family Medical History reviewed for relevant medical history, current medical status, and diabetes risk factors.    Vitals:  [facility] 02/04/2025   Estimated body mass index is 29.63 kg/m  as calculated from the following:    Height as of 2/14/25: 1.495 m (4' 10.86\").    Weight as of 2/14/25: 66.2 kg (146 lb).   Last 3 BP:   BP Readings from Last 3 Encounters:   02/14/25 113/76   08/19/24 133/88   07/22/24 117/72       History   Smoking Status    Never   Smokeless Tobacco    Never       Labs:  Lab Results   Component Value Date    A1C 7.3 02/14/2025     Lab Results   Component Value Date     02/14/2025     07/22/2024     Lab Results   Component Value Date    LDL  02/14/2025      Comment:      Cannot estimate LDL when triglyceride exceeds 400 mg/dL    LDL 85 03/28/2013     Direct Measure HDL   Date Value Ref Range Status   02/14/2025 28 (L) >=50 mg/dL Final   ]  GFR Estimate   Date Value Ref Range Status   02/14/2025 71 >60 mL/min/1.73m2 Final     Comment:     eGFR calculated using 2021 CKD-EPI equation.   03/30/2021 56 (L) >60 mL/min/1.73m2 Final     GFR Estimate If Black   Date Value Ref Range Status   03/30/2021 >60 >60 mL/min/1.73m2 Final     Lab Results   Component Value Date    CR 1.00 02/14/2025     No results found for: \"MICROALBUMIN\"    Healthy Eating:  Healthy Eating Assessed Today: Yes  Cultural/Alevism diet restrictions?: (Patient-Rptd) No  How many times a week on average do you eat food made away from home (restaurant/take-out)?: (Patient-Rptd) 1  Meals include: (Patient-Rptd) Lunch, Dinner, Morning Snack, Evening Snack  Breakfast: eggs/cheese, one piece of toast, mug of orange juice  Lunch: asian bun filled with meat/mushrooms  Dinner: " skips or same as lunch  Beverages: Water, Tea, Diet soda, Juice  Has patient met with a dietitian in the past?: Yes    Being Active:  Being Active Assessed Today: Yes  Exercise:: Currently not exercising  Barrier to exercise: (Patient-Rptd) None    Monitoring:  Monitoring Assessed Today: Yes  Did patient bring glucose meter to appointment? : Yes  Blood Glucose Meter: Accu-chek, Other, CGM  Times checking blood sugar at home (number): (Patient-Rptd) Other (see Comments)  Please elaborate:: (Patient-Rptd) I have a dexcom  Times checking blood sugar at home (per): (Patient-Rptd) Day    CGM data:         Taking Medications:  Diabetes Medication(s)       Insulin       insulin lispro (HUMALOG KWIKPEN) 100 UNIT/ML (1 unit dial) KWIKPEN Inject 3 Units subcutaneously 3 times daily (before meals).     insulin glargine (LANTUS PEN) 100 UNIT/ML pen Inject 42 Units subcutaneously at bedtime. Increase by 2 units every 3 days until your fasting blood sugar is below 130.   Max dose 50 units.          Taking Medication Assessed Today: Yes  Current Treatments: (Patient-Rptd) Insulin Injections  Problems taking diabetes medications regularly?: No    Problem Solving:  Problem Solving Assessed Today: Yes  Is the patient at risk for hypoglycemia?: Yes  Hypoglycemia Frequency: Never        Reducing Risks:  Reducing Risks Assessed Today: Yes  Diabetes Risks: Sedentary Lifestyle, Ethnicity  Has dilated eye exam at least once a year?: (Patient-Rptd) No  Sees dentist every 6 months?: (Patient-Rptd) Yes  Feet checked by healthcare provider in the last year?: (Patient-Rptd) Yes    Healthy Coping:  Healthy Coping Assessed Today: Yes  Emotional response to diabetes: Concern for health and well-being, Other (frustration with not being able to manage BG the was she would like to)  Informal Support system:: (Patient-Rptd) Family, Friends, Parent, Spouse  Stage of change: PREPARATION (Decided to change - considering how)    Guadalupe Hung RD  Time  Spent: 30 minutes  Encounter Type: Individual    [facility] diabetes medication dose [facility] the patient's referring provider.

## 2025-02-25 NOTE — LETTER
2/25/2025         RE: Be Alexis  [address]  [place] [address]        Dear Colleague,    Thank you for referring your patient, Be Alexis, to the [facility]. Please see a copy of my visit note below.    Diabetes Self-Management Education & Support    Presents for:      Type of Service: Telephone Visit    Originating Location (Patient Location): Home  Distant Location (Provider Location): Offsite  Mode of Communication:  Telephone    Telephone Visit Start Time:  1:59  Telephone Visit End Time (telephone visit stop time): 2:29    How would patient like to obtain AVS? MyChart      Assessment  Be is currently injecting lantus 50 units in the PM, she is using the Dexcom G7 with recent data from the last 14 days indicating average BG of 175 mg/dl with 64% TIR.  eB is planning for IVF and her PCP for IVF would like her A1C to be at 6.5% or below. Be expressed frustration with not being able to lower her A1C to goal and feeling like it is hard to manage her BG, even though she has been making nutritional changes. Be is not currently exercising, she and her spouse have been working on a home remodel for several months and hope to move into their now home in the next few weeks. She feels like her stress will decrease after they move, with work and remodeling she has not had time to add in exercise.         Patient's most recent   Lab Results   Component Value Date    A1C 7.3 02/14/2025     is meeting goal of <8.0    Diabetes knowledge and skills assessment:   Patient is knowledgeable in diabetes management concepts related to: Healthy Eating, Being Active, Monitoring, and Taking Medication    Based on learning assessment above, most appropriate setting for further diabetes education would be: Individual setting.    Care Plan and Education Provided:  Healthy Eating: Carbohydrate Counting and limiting sugary beverages, Being Active: adding in exercise , Monitoring:  Individual glucose targets, Taking Medication: Action of prescribed medication(s) and When to take medication(s), Problem Solving: Low glucose - causes, signs/symptoms, treatment and prevention and Rule of 15 and carrying a carbohydrate source at all times in case of low glucose, Reducing Risks: Goal for A1c, how it relates to glucose and how often to check, and Healthy Coping: Benefits of making appropriate lifestyle changes    Patient verbalized understanding of diabetes self-management education concepts discussed, opportunities for ongoing education and support, and recommendations provided today.      Patient Instructions   Inject 3 units of meal time insulin, 10-15 minutes before the start of each meal.  If you skip a meal, do not inject the meal time insulin.   Decrease lantus to 41 units when you inject meal time insulin, 3 units three times daily.   If your blood sugar drops below 70, have a small glass of juice, recheck your blood sugar in 15 minutes to make sure it is above 70, if not, repeat the steps.   Document your fasting blood sugar and two hours after each meal in your Dexcom G7 winsome.  Limit carbohydrates to no more than 45 grams per meal and 15 grams per snack.   Limit juice portion.  Add in 10-15 minutes of exercise after meals.   Schedule a diabetic eye exam if you have not had one completed in the last 12 months.        Diabetes educator will assess BG in one week to make insulin adjustments if needed.     Topics to cover at upcoming visits: Monitoring and Taking Medication    Follow-up:  Upcoming Diabetes Ed Appointments     Visit Type Date Time Department    DIABETES ED 2/25/2025  2:00 PM [facility] DIABETES EDUCATION        See Care Plan for co-developed, patient-state behavior change goals.    Education Materials Provided:  No new materials provided today      Subjective/Objective  Be is an 44 year old year old, presenting for the following diabetes education related to: Diabetes education in  "the past 24mo: (Patient-Rptd) Yes  Diabetes type: (Patient-Rptd) Type 2  Disease course: (Patient-Rptd) Improving  How confident are you filling out medical forms by yourself:: (Patient-Rptd) Extremely  Cultural Influences/Ethnic Background:  Not  or       Diabetes Symptoms & Complications:  Diabetes Related Symptoms: (Patient-Rptd) None  Weight trend: (Patient-Rptd) Increasing  Symptom course: (Patient-Rptd) Stable  Disease course: (Patient-Rptd) Improving       Patient Problem List and Family Medical History reviewed for relevant medical history, current medical status, and diabetes risk factors.    Vitals:  [facility]MP 02/04/2025   Estimated body mass index is 29.63 kg/m  as calculated from the following:    Height as of 2/14/25: 1.495 m (4' 10.86\").    Weight as of 2/14/25: 66.2 kg (146 lb).   Last 3 BP:   BP Readings from Last 3 Encounters:   02/14/25 113/76   08/19/24 133/88   07/22/24 117/72       History   Smoking Status     Never   Smokeless Tobacco     Never       Labs:  Lab Results   Component Value Date    A1C 7.3 02/14/2025     Lab Results   Component Value Date     02/14/2025     07/22/2024     Lab Results   Component Value Date    LDL  02/14/2025      Comment:      Cannot estimate LDL when triglyceride exceeds 400 mg/dL    LDL 85 03/28/2013     Direct Measure HDL   Date Value Ref Range Status   02/14/2025 28 (L) >=50 mg/dL Final   ]  GFR Estimate   Date Value Ref Range Status   02/14/2025 71 >60 mL/min/1.73m2 Final     Comment:     eGFR calculated using 2021 CKD-EPI equation.   03/30/2021 56 (L) >60 mL/min/1.73m2 Final     GFR Estimate If Black   Date Value Ref Range Status   03/30/2021 >60 >60 mL/min/1.73m2 Final     Lab Results   Component Value Date    CR 1.00 02/14/2025     No results found for: \"MICROALBUMIN\"    Healthy Eating:  Healthy Eating Assessed Today: Yes  Cultural/Judaism diet restrictions?: (Patient-Rptd) No  How many times a week on average do you eat food made " away from home (restaurant/take-out)?: (Patient-Rptd) 1  Meals include: (Patient-Rptd) Lunch, Dinner, Morning Snack, Evening Snack  Breakfast: eggs/cheese, one piece of toast, mug of orange juice  Lunch: asian bun filled with meat/mushrooms  Dinner: skips or same as lunch  Beverages: Water, Tea, Diet soda, Juice  Has patient met with a dietitian in the past?: Yes    Being Active:  Being Active Assessed Today: Yes  Exercise:: Currently not exercising  Barrier to exercise: (Patient-Rptd) None    Monitoring:  Monitoring Assessed Today: Yes  Did patient bring glucose meter to appointment? : Yes  Blood Glucose Meter: Accu-chek, Other, CGM  Times checking blood sugar at home (number): (Patient-Rptd) Other (see Comments)  Please elaborate:: (Patient-Rptd) I have a dexcom  Times checking blood sugar at home (per): (Patient-Rptd) Day    CGM data:         Taking Medications:  Diabetes Medication(s)       Insulin       insulin lispro (HUMALOG KWIKPEN) 100 UNIT/ML (1 unit dial) KWIKPEN Inject 3 Units subcutaneously 3 times daily (before meals).     insulin glargine (LANTUS PEN) 100 UNIT/ML pen Inject 42 Units subcutaneously at bedtime. Increase by 2 units every 3 days until your fasting blood sugar is below 130.   Max dose 50 units.          Taking Medication Assessed Today: Yes  Current Treatments: (Patient-Rptd) Insulin Injections  Problems taking diabetes medications regularly?: No    Problem Solving:  Problem Solving Assessed Today: Yes  Is the patient at risk for hypoglycemia?: Yes  Hypoglycemia Frequency: Never        Reducing Risks:  Reducing Risks Assessed Today: Yes  Diabetes Risks: Sedentary Lifestyle, Ethnicity  Has dilated eye exam at least once a year?: (Patient-Rptd) No  Sees dentist every 6 months?: (Patient-Rptd) Yes  Feet checked by healthcare provider in the last year?: (Patient-Rptd) Yes    Healthy Coping:  Healthy Coping Assessed Today: Yes  Emotional response to diabetes: Concern for health and well-being,  Other (frustration with not being able to manage BG the was she would like to)  Informal Support system:: (Patient-Rptd) Family, Friends, Parent, Spouse  Stage of change: PREPARATION (Decided to change - considering how)    Guadalupe Hung RD  Time Spent: 30 minutes  Encounter Type: Individual    Any diabetes medication dose changes were made via the [facility] Standing Orders under the patient's referring provider.

## 2025-02-25 NOTE — PATIENT INSTRUCTIONS
Inject 3 units of meal time insulin, 10-15 minutes before the start of each meal.  If you skip a meal, do not inject the meal time insulin.   Decrease lantus to 41 units when you inject meal time insulin, 3 units three times daily.   If your blood sugar drops below 70, have a small glass of juice, recheck your blood sugar in 15 minutes to make sure it is above 70, if not, repeat the steps.   If your blood sugar goes below 70, two times in the same week, send a [employer] message to the diabetes educator.   Document your fasting blood sugar and two hours after each meal in your Dexcom G7 winsome.  Limit carbohydrates to no more than 45 grams per meal and 15 grams per snack.   Limit juice portion.  Add in 10-15 minutes of exercise after meals.   Schedule a diabetic eye exam if you have not had one completed in the last 12 months.     Diabetes educator will assess blood glucose readings on Dexcom G7 in one week and send a [employer] message for insulin adjustments if needed.